# Patient Record
Sex: FEMALE | Race: WHITE | NOT HISPANIC OR LATINO | Employment: FULL TIME | URBAN - NONMETROPOLITAN AREA
[De-identification: names, ages, dates, MRNs, and addresses within clinical notes are randomized per-mention and may not be internally consistent; named-entity substitution may affect disease eponyms.]

---

## 2018-03-02 ENCOUNTER — HOSPITAL ENCOUNTER (EMERGENCY)
Facility: HOSPITAL | Age: 33
Discharge: HOME | End: 2018-03-02
Attending: FAMILY MEDICINE | Admitting: FAMILY MEDICINE

## 2018-03-02 ENCOUNTER — APPOINTMENT (OUTPATIENT)
Dept: GENERAL RADIOLOGY | Facility: HOSPITAL | Age: 33
End: 2018-03-02

## 2018-03-02 VITALS
HEIGHT: 65 IN | DIASTOLIC BLOOD PRESSURE: 70 MMHG | WEIGHT: 253 LBS | BODY MASS INDEX: 42.15 KG/M2 | HEART RATE: 73 BPM | TEMPERATURE: 98.7 F | OXYGEN SATURATION: 97 % | SYSTOLIC BLOOD PRESSURE: 137 MMHG | RESPIRATION RATE: 18 BRPM

## 2018-03-02 DIAGNOSIS — R10.10 PAIN OF UPPER ABDOMEN: ICD-10-CM

## 2018-03-02 DIAGNOSIS — K27.9 PEPTIC ULCER DISEASE: ICD-10-CM

## 2018-03-02 DIAGNOSIS — R07.81 PLEURODYNIA: Primary | ICD-10-CM

## 2018-03-02 LAB
ALBUMIN SERPL-MCNC: 4.5 G/DL (ref 3.4–4.8)
ALBUMIN/GLOB SERPL: 1.3 G/DL (ref 1.1–1.8)
ALP SERPL-CCNC: 53 U/L (ref 38–126)
ALT SERPL W P-5'-P-CCNC: 35 U/L (ref 9–52)
ANION GAP SERPL CALCULATED.3IONS-SCNC: 13 MMOL/L (ref 5–15)
AST SERPL-CCNC: 32 U/L (ref 14–36)
BASOPHILS # BLD AUTO: 0.02 10*3/MM3 (ref 0–0.2)
BASOPHILS NFR BLD AUTO: 0.2 % (ref 0–2)
BILIRUB SERPL-MCNC: 0.3 MG/DL (ref 0.2–1.3)
BUN BLD-MCNC: 17 MG/DL (ref 7–21)
BUN/CREAT SERPL: 18.9 (ref 7–25)
CALCIUM SPEC-SCNC: 9.5 MG/DL (ref 8.4–10.2)
CHLORIDE SERPL-SCNC: 100 MMOL/L (ref 95–110)
CK SERPL-CCNC: 211 U/L (ref 30–135)
CO2 SERPL-SCNC: 26 MMOL/L (ref 22–31)
CREAT BLD-MCNC: 0.9 MG/DL (ref 0.5–1)
DEPRECATED RDW RBC AUTO: 47.2 FL (ref 36.4–46.3)
EOSINOPHIL # BLD AUTO: 0.14 10*3/MM3 (ref 0–0.7)
EOSINOPHIL NFR BLD AUTO: 1.1 % (ref 0–7)
ERYTHROCYTE [DISTWIDTH] IN BLOOD BY AUTOMATED COUNT: 15.2 % (ref 11.5–14.5)
GFR SERPL CREATININE-BSD FRML MDRD: 72 ML/MIN/1.73 (ref 64–149)
GLOBULIN UR ELPH-MCNC: 3.4 GM/DL (ref 2.3–3.5)
GLUCOSE BLD-MCNC: 90 MG/DL (ref 60–100)
HCG SERPL QL: NEGATIVE
HCT VFR BLD AUTO: 35.7 % (ref 35–45)
HGB BLD-MCNC: 11.5 G/DL (ref 12–15.5)
HOLD SPECIMEN: NORMAL
HOLD SPECIMEN: NORMAL
IMM GRANULOCYTES # BLD: 0.03 10*3/MM3 (ref 0–0.02)
IMM GRANULOCYTES NFR BLD: 0.2 % (ref 0–0.5)
LYMPHOCYTES # BLD AUTO: 3.58 10*3/MM3 (ref 0.6–4.2)
LYMPHOCYTES NFR BLD AUTO: 29.2 % (ref 10–50)
MAGNESIUM SERPL-MCNC: 2.1 MG/DL (ref 1.6–2.3)
MCH RBC QN AUTO: 27.1 PG (ref 26.5–34)
MCHC RBC AUTO-ENTMCNC: 32.2 G/DL (ref 31.4–36)
MCV RBC AUTO: 84.2 FL (ref 80–98)
MONOCYTES # BLD AUTO: 0.64 10*3/MM3 (ref 0–0.9)
MONOCYTES NFR BLD AUTO: 5.2 % (ref 0–12)
NEUTROPHILS # BLD AUTO: 7.83 10*3/MM3 (ref 2–8.6)
NEUTROPHILS NFR BLD AUTO: 64.1 % (ref 37–80)
NT-PROBNP SERPL-MCNC: 48.9 PG/ML (ref 0–450)
PLATELET # BLD AUTO: 407 10*3/MM3 (ref 150–450)
PMV BLD AUTO: 9.2 FL (ref 8–12)
POTASSIUM BLD-SCNC: 3.8 MMOL/L (ref 3.5–5.1)
PROT SERPL-MCNC: 7.9 G/DL (ref 6.3–8.6)
RBC # BLD AUTO: 4.24 10*6/MM3 (ref 3.77–5.16)
SODIUM BLD-SCNC: 139 MMOL/L (ref 137–145)
TROPONIN I SERPL-MCNC: <0.012 NG/ML
WBC NRBC COR # BLD: 12.24 10*3/MM3 (ref 3.2–9.8)
WHOLE BLOOD HOLD SPECIMEN: NORMAL
WHOLE BLOOD HOLD SPECIMEN: NORMAL

## 2018-03-02 PROCEDURE — 84703 CHORIONIC GONADOTROPIN ASSAY: CPT | Performed by: FAMILY MEDICINE

## 2018-03-02 PROCEDURE — 93005 ELECTROCARDIOGRAM TRACING: CPT | Performed by: FAMILY MEDICINE

## 2018-03-02 PROCEDURE — 83735 ASSAY OF MAGNESIUM: CPT | Performed by: FAMILY MEDICINE

## 2018-03-02 PROCEDURE — 96374 THER/PROPH/DIAG INJ IV PUSH: CPT

## 2018-03-02 PROCEDURE — 99284 EMERGENCY DEPT VISIT MOD MDM: CPT

## 2018-03-02 PROCEDURE — 82550 ASSAY OF CK (CPK): CPT | Performed by: FAMILY MEDICINE

## 2018-03-02 PROCEDURE — 84484 ASSAY OF TROPONIN QUANT: CPT | Performed by: FAMILY MEDICINE

## 2018-03-02 PROCEDURE — 25010000002 ONDANSETRON PER 1 MG: Performed by: FAMILY MEDICINE

## 2018-03-02 PROCEDURE — 93010 ELECTROCARDIOGRAM REPORT: CPT | Performed by: INTERNAL MEDICINE

## 2018-03-02 PROCEDURE — 96375 TX/PRO/DX INJ NEW DRUG ADDON: CPT

## 2018-03-02 PROCEDURE — 80053 COMPREHEN METABOLIC PANEL: CPT | Performed by: FAMILY MEDICINE

## 2018-03-02 PROCEDURE — 71045 X-RAY EXAM CHEST 1 VIEW: CPT

## 2018-03-02 PROCEDURE — 85025 COMPLETE CBC W/AUTO DIFF WBC: CPT | Performed by: FAMILY MEDICINE

## 2018-03-02 PROCEDURE — 93005 ELECTROCARDIOGRAM TRACING: CPT

## 2018-03-02 PROCEDURE — 83880 ASSAY OF NATRIURETIC PEPTIDE: CPT | Performed by: FAMILY MEDICINE

## 2018-03-02 RX ORDER — FAMOTIDINE 40 MG/1
40 TABLET, FILM COATED ORAL 2 TIMES DAILY
Qty: 10 TABLET | Refills: 0 | OUTPATIENT
Start: 2018-03-02 | End: 2018-12-07

## 2018-03-02 RX ORDER — FAMOTIDINE 10 MG/ML
20 INJECTION, SOLUTION INTRAVENOUS EVERY 12 HOURS SCHEDULED
Status: DISCONTINUED | OUTPATIENT
Start: 2018-03-02 | End: 2018-03-03 | Stop reason: HOSPADM

## 2018-03-02 RX ORDER — ONDANSETRON 2 MG/ML
4 INJECTION INTRAMUSCULAR; INTRAVENOUS ONCE
Status: COMPLETED | OUTPATIENT
Start: 2018-03-02 | End: 2018-03-02

## 2018-03-02 RX ORDER — HYDROCODONE BITARTRATE AND ACETAMINOPHEN 5; 325 MG/1; MG/1
1 TABLET ORAL EVERY 6 HOURS PRN
Qty: 15 TABLET | Refills: 0 | OUTPATIENT
Start: 2018-03-02 | End: 2018-12-07

## 2018-03-02 RX ORDER — ASPIRIN 325 MG
325 TABLET ORAL ONCE
Status: COMPLETED | OUTPATIENT
Start: 2018-03-02 | End: 2018-03-02

## 2018-03-02 RX ORDER — ALUMINA, MAGNESIA, AND SIMETHICONE 2400; 2400; 240 MG/30ML; MG/30ML; MG/30ML
15 SUSPENSION ORAL ONCE
Status: COMPLETED | OUTPATIENT
Start: 2018-03-02 | End: 2018-03-02

## 2018-03-02 RX ORDER — ONDANSETRON 4 MG/1
4 TABLET, ORALLY DISINTEGRATING ORAL EVERY 6 HOURS PRN
Qty: 10 TABLET | Refills: 0 | OUTPATIENT
Start: 2018-03-02 | End: 2018-12-07

## 2018-03-02 RX ORDER — SODIUM CHLORIDE 0.9 % (FLUSH) 0.9 %
10 SYRINGE (ML) INJECTION AS NEEDED
Status: DISCONTINUED | OUTPATIENT
Start: 2018-03-02 | End: 2018-03-03 | Stop reason: HOSPADM

## 2018-03-02 RX ADMIN — ASPIRIN 325 MG: 325 TABLET, COATED ORAL at 20:25

## 2018-03-02 RX ADMIN — ALUMINUM HYDROXIDE, MAGNESIUM HYDROXIDE, AND DIMETHICONE 15 ML: 400; 400; 40 SUSPENSION ORAL at 20:26

## 2018-03-02 RX ADMIN — LIDOCAINE HYDROCHLORIDE 15 ML: 20 SOLUTION ORAL; TOPICAL at 20:26

## 2018-03-02 RX ADMIN — FAMOTIDINE 20 MG: 10 INJECTION, SOLUTION INTRAVENOUS at 20:26

## 2018-03-02 RX ADMIN — ONDANSETRON 4 MG: 2 INJECTION, SOLUTION INTRAMUSCULAR; INTRAVENOUS at 20:26

## 2018-03-03 NOTE — ED PROVIDER NOTES
Subjective   Patient is a 32 y.o. female presenting with chest pain.   Chest Pain   Pain location:  Substernal area  Pain quality: sharp    Pain radiates to:  Upper back  Pain severity:  Moderate  Duration:  3 days  Timing:  Intermittent  Progression:  Waxing and waning  Chronicity:  New  Context: breathing and movement    Relieved by:  Nothing  Worsened by:  Coughing, exertion, movement and deep breathing (eating, swallowing)  Ineffective treatments:  None tried  Associated symptoms: abdominal pain, back pain, dizziness and nausea    Associated symptoms: no altered mental status, no anorexia, no anxiety, no claudication, no cough, no diaphoresis, no dysphagia, no fatigue, no fever, no headache, no lower extremity edema, no near-syncope, no numbness, no palpitations, no shortness of breath, no vomiting and no weakness        Review of Systems   Constitutional: Negative for appetite change, chills, diaphoresis, fatigue and fever.   HENT: Negative for congestion, ear discharge, ear pain, nosebleeds, rhinorrhea, sinus pressure, sore throat and trouble swallowing.    Eyes: Negative for discharge and redness.   Respiratory: Negative for apnea, cough, chest tightness, shortness of breath and wheezing.    Cardiovascular: Positive for chest pain. Negative for palpitations, claudication and near-syncope.   Gastrointestinal: Positive for abdominal pain and nausea. Negative for anorexia, diarrhea and vomiting.   Endocrine: Negative for polyuria.   Genitourinary: Negative for dysuria, frequency and urgency.   Musculoskeletal: Positive for back pain. Negative for myalgias and neck pain.   Skin: Negative for color change and rash.   Allergic/Immunologic: Negative for immunocompromised state.   Neurological: Positive for dizziness. Negative for seizures, syncope, weakness, light-headedness, numbness and headaches.   Hematological: Negative for adenopathy. Does not bruise/bleed easily.   Psychiatric/Behavioral: Negative for  behavioral problems and confusion.   All other systems reviewed and are negative.      No past medical history on file.    Allergies   Allergen Reactions   • Penicillins Other (See Comments)     unknown       No past surgical history on file.    No family history on file.    Social History     Social History   • Marital status: Single           Objective   Physical Exam   Constitutional: She is oriented to person, place, and time. She appears well-developed and well-nourished.   HENT:   Head: Normocephalic and atraumatic.   Nose: Nose normal.   Mouth/Throat: Oropharynx is clear and moist.   Eyes: Conjunctivae and EOM are normal. Pupils are equal, round, and reactive to light. Right eye exhibits no discharge. Left eye exhibits no discharge. No scleral icterus.   Neck: Normal range of motion. Neck supple. No tracheal deviation present.   Cardiovascular: Normal rate, regular rhythm and normal heart sounds.    No murmur heard.  Pulmonary/Chest: Effort normal and breath sounds normal. No stridor. No respiratory distress. She has no wheezes. She has no rales.       Abdominal: Soft. Bowel sounds are normal. She exhibits no distension and no mass. There is no tenderness. There is no rebound and no guarding.   Genitourinary: There is breast tenderness.   Musculoskeletal: She exhibits no edema.   Neurological: She is alert and oriented to person, place, and time. Coordination normal.   Skin: Skin is warm and dry. No rash noted. No erythema.   Psychiatric: She has a normal mood and affect. Her behavior is normal. Thought content normal.   Nursing note and vitals reviewed.      ECG 12 Lead    Date/Time: 3/2/2018 9:51 PM  Performed by: DANNY ORNELAS  Authorized by: DANNY ORNELAS   Interpreted by physician  Rhythm: sinus rhythm  Rate: normal  BPM: 74  ST Segments: ST segments normal                 ED Course  ED Course            Labs Reviewed   CBC WITH AUTO DIFFERENTIAL - Abnormal; Notable for the following:         Result Value    WBC 12.24 (*)     Hemoglobin 11.5 (*)     RDW 15.2 (*)     RDW-SD 47.2 (*)     Immature Grans, Absolute 0.03 (*)     All other components within normal limits   CK - Abnormal; Notable for the following:     Creatine Kinase 211 (*)     All other components within normal limits   TROPONIN (IN-HOUSE) - Normal   COMPREHENSIVE METABOLIC PANEL - Normal   BNP (IN-HOUSE) - Normal   HCG, SERUM, QUALITATIVE - Normal   MAGNESIUM - Normal   RAINBOW DRAW    Narrative:     The following orders were created for panel order [place] Draw.  Procedure                               Abnormality         Status                     ---------                               -----------         ------                     Light Blue Top[[id number]]                                   Final result               Green Top (Gel)[[id number]]                                  Final result               Lavender Top[[id number]]                                     Final result               Gold Top - SST[[id number]]                                   Final result                 Please view results for these tests on the individual orders.   TROPONIN (IN-HOUSE)   CBC AND DIFFERENTIAL    Narrative:     The following orders were created for panel order CBC & Differential.  Procedure                               Abnormality         Status                     ---------                               -----------         ------                     CBC Auto Differential[[id number]]        Abnormal            Final result                 Please view results for these tests on the individual orders.   LIGHT BLUE TOP   GREEN TOP   LAVENDER TOP   GOLD TOP - SST       XR Chest 1 View   Final Result   No active disease.      Electronically signed by:  Caleb Jameson  3/2/2018 8:51 PM [facility]   Workstation: [id number]                  [facility]    Final diagnoses:   Pleurodynia   Pain of upper abdomen   Peptic ulcer disease            Chidi Bennett,  MD  03/02/18 7745

## 2018-03-03 NOTE — DISCHARGE INSTRUCTIONS
"    Hypertension  Hypertension, commonly called high blood pressure, is when the force of blood pumping through the arteries is too strong. The arteries are the blood vessels that carry blood from the heart throughout the body. Hypertension forces the heart to work harder to pump blood and may cause arteries to become narrow or stiff. Having untreated or uncontrolled hypertension can cause heart attacks, strokes, kidney disease, and other problems.  A blood pressure reading consists of a higher number over a lower number. Ideally, your blood pressure should be below 120/80. The first (\"top\") number is called the systolic pressure. It is a measure of the pressure in your arteries as your heart beats. The second (\"bottom\") number is called the diastolic pressure. It is a measure of the pressure in your arteries as the heart relaxes.  What are the causes?  The cause of this condition is not known.  What increases the risk?  Some risk factors for high blood pressure are under your control. Others are not.  Factors you can change   · Smoking.  · Having type 2 diabetes mellitus, high cholesterol, or both.  · Not getting enough exercise or physical activity.  · Being overweight.  · Having too much fat, sugar, calories, or salt (sodium) in your diet.  · Drinking too much alcohol.  Factors that are difficult or impossible to change   · Having chronic kidney disease.  · Having a family history of high blood pressure.  · Age. Risk increases with age.  · Race. You may be at higher risk if you are -American.  · Gender. Men are at higher risk than women before age 45. After age 65, women are at higher risk than men.  · Having obstructive sleep apnea.  · Stress.  What are the signs or symptoms?  Extremely high blood pressure (hypertensive crisis) may cause:  · Headache.  · Anxiety.  · Shortness of breath.  · Nosebleed.  · Nausea and vomiting.  · Severe chest pain.  · Jerky movements you cannot control (seizures).  How is " this diagnosed?  This condition is diagnosed by measuring your blood pressure while you are seated, with your arm resting on a surface. The cuff of the blood pressure monitor will be placed directly against the skin of your upper arm at the level of your heart. It should be measured at least twice using the same arm. Certain conditions can cause a difference in blood pressure between your right and left arms.  Certain factors can cause blood pressure readings to be lower or higher than normal (elevated) for a short period of time:  · When your blood pressure is higher when you are in a health care provider's office than when you are at home, this is called white coat hypertension. Most people with this condition do not need medicines.  · When your blood pressure is higher at home than when you are in a health care provider's office, this is called masked hypertension. Most people with this condition may need medicines to control blood pressure.  If you have a high blood pressure reading during one visit or you have normal blood pressure with other risk factors:  · You may be asked to return on a different day to have your blood pressure checked again.  · You may be asked to monitor your blood pressure at home for 1 week or longer.  If you are diagnosed with hypertension, you may have other blood or imaging tests to help your health care provider understand your overall risk for other conditions.  How is this treated?  This condition is treated by making healthy lifestyle changes, such as eating healthy foods, exercising more, and reducing your alcohol intake. Your health care provider may prescribe medicine if lifestyle changes are not enough to get your blood pressure under control, and if:  · Your systolic blood pressure is above 130.  · Your diastolic blood pressure is above 80.  Your personal target blood pressure may vary depending on your medical conditions, your age, and other factors.  Follow these  instructions at home:  Eating and drinking   · Eat a diet that is high in fiber and potassium, and low in sodium, added sugar, and fat. An example eating plan is called the DASH (Dietary Approaches to Stop Hypertension) diet. To eat this way:  ¨ Eat plenty of fresh fruits and vegetables. Try to fill half of your plate at each meal with fruits and vegetables.  ¨ Eat whole grains, such as whole wheat pasta, brown rice, or whole grain bread. Fill about one quarter of your plate with whole grains.  ¨ Eat or drink low-fat dairy products, such as skim milk or low-fat yogurt.  ¨ Avoid fatty cuts of meat, processed or cured meats, and poultry with skin. Fill about one quarter of your plate with lean proteins, such as fish, chicken without skin, beans, eggs, and tofu.  ¨ Avoid premade and processed foods. These tend to be higher in sodium, added sugar, and fat.  · Reduce your daily sodium intake. Most people with hypertension should eat less than 1,500 mg of sodium a day.  · Limit alcohol intake to no more than 1 drink a day for nonpregnant women and 2 drinks a day for men. One drink equals 12 oz of beer, 5 oz of wine, or 1½ oz of hard liquor.  Lifestyle   · Work with your health care provider to maintain a healthy body weight or to lose weight. Ask what an ideal weight is for you.  · Get at least 30 minutes of exercise that causes your heart to beat faster (aerobic exercise) most days of the week. Activities may include walking, swimming, or biking.  · Include exercise to strengthen your muscles (resistance exercise), such as pilates or lifting weights, as part of your weekly exercise routine. Try to do these types of exercises for 30 minutes at least 3 days a week.  · Do not use any products that contain nicotine or tobacco, such as cigarettes and e-cigarettes. If you need help quitting, ask your health care provider.  · Monitor your blood pressure at home as told by your health care provider.  · Keep all follow-up visits  as told by your health care provider. This is important.  Medicines   · Take over-the-counter and prescription medicines only as told by your health care provider. Follow directions carefully. Blood pressure medicines must be taken as prescribed.  · Do not skip doses of blood pressure medicine. Doing this puts you at risk for problems and can make the medicine less effective.  · Ask your health care provider about side effects or reactions to medicines that you should watch for.  Contact a health care provider if:  · You think you are having a reaction to a medicine you are taking.  · You have headaches that keep coming back (recurring).  · You feel dizzy.  · You have swelling in your ankles.  · You have trouble with your vision.  Get help right away if:  · You develop a severe headache or confusion.  · You have unusual weakness or numbness.  · You feel faint.  · You have severe pain in your chest or abdomen.  · You vomit repeatedly.  · You have trouble breathing.  Summary  · Hypertension is when the force of blood pumping through your arteries is too strong. If this condition is not controlled, it may put you at risk for serious complications.  · Your personal target blood pressure may vary depending on your medical conditions, your age, and other factors. For most people, a normal blood pressure is less than 120/80.  · Hypertension is treated with lifestyle changes, medicines, or a combination of both. Lifestyle changes include weight loss, eating a healthy, low-sodium diet, exercising more, and limiting alcohol.  This information is not intended to replace advice given to you by your health care provider. Make sure you discuss any questions you have with your health care provider.  Document Released: 12/18/2006 Document Revised: 11/15/2017 Document Reviewed: 11/15/2017  [employer] Interactive Patient Education © 2017 [employer]

## 2018-12-07 ENCOUNTER — HOSPITAL ENCOUNTER (EMERGENCY)
Facility: HOSPITAL | Age: 33
Discharge: HOME | End: 2018-12-07
Attending: EMERGENCY MEDICINE | Admitting: EMERGENCY MEDICINE

## 2018-12-07 VITALS
TEMPERATURE: 98.6 F | HEART RATE: 72 BPM | DIASTOLIC BLOOD PRESSURE: 70 MMHG | BODY MASS INDEX: 41.66 KG/M2 | OXYGEN SATURATION: 98 % | WEIGHT: 244 LBS | SYSTOLIC BLOOD PRESSURE: 130 MMHG | HEIGHT: 64 IN | RESPIRATION RATE: 18 BRPM

## 2018-12-07 DIAGNOSIS — R89.9 ABNORMAL LABORATORY TEST: Primary | ICD-10-CM

## 2018-12-07 LAB
ANION GAP SERPL CALCULATED.3IONS-SCNC: 10 MMOL/L (ref 5–15)
BASOPHILS # BLD AUTO: 0.02 10*3/MM3 (ref 0–0.2)
BASOPHILS NFR BLD AUTO: 0.2 % (ref 0–2)
BILIRUB UR QL STRIP: NEGATIVE
BUN BLD-MCNC: 16 MG/DL (ref 7–21)
BUN/CREAT SERPL: 21.6 (ref 7–25)
CALCIUM SPEC-SCNC: 9.3 MG/DL (ref 8.4–10.2)
CHLORIDE SERPL-SCNC: 97 MMOL/L (ref 95–110)
CLARITY UR: CLEAR
CO2 SERPL-SCNC: 25 MMOL/L (ref 22–31)
COLOR UR: YELLOW
CREAT BLD-MCNC: 0.74 MG/DL (ref 0.5–1)
DEPRECATED RDW RBC AUTO: 43.8 FL (ref 36.4–46.3)
EOSINOPHIL # BLD AUTO: 0.14 10*3/MM3 (ref 0–0.7)
EOSINOPHIL NFR BLD AUTO: 1.3 % (ref 0–7)
ERYTHROCYTE [DISTWIDTH] IN BLOOD BY AUTOMATED COUNT: 13.9 % (ref 11.5–14.5)
GFR SERPL CREATININE-BSD FRML MDRD: 89 ML/MIN/1.73 (ref 64–149)
GLUCOSE BLD-MCNC: 86 MG/DL (ref 60–100)
GLUCOSE UR STRIP-MCNC: NEGATIVE MG/DL
HCG SERPL QL: NEGATIVE
HCT VFR BLD AUTO: 33.9 % (ref 35–45)
HGB BLD-MCNC: 11.1 G/DL (ref 12–15.5)
HGB UR QL STRIP.AUTO: NEGATIVE
HOLD SPECIMEN: NORMAL
HOLD SPECIMEN: NORMAL
IMM GRANULOCYTES # BLD: 0.03 10*3/MM3 (ref 0–0.02)
IMM GRANULOCYTES NFR BLD: 0.3 % (ref 0–0.5)
KETONES UR QL STRIP: NEGATIVE
LEUKOCYTE ESTERASE UR QL STRIP.AUTO: NEGATIVE
LYMPHOCYTES # BLD AUTO: 3.66 10*3/MM3 (ref 0.6–4.2)
LYMPHOCYTES NFR BLD AUTO: 35.2 % (ref 10–50)
MCH RBC QN AUTO: 28.2 PG (ref 26.5–34)
MCHC RBC AUTO-ENTMCNC: 32.7 G/DL (ref 31.4–36)
MCV RBC AUTO: 86 FL (ref 80–98)
MONOCYTES # BLD AUTO: 0.61 10*3/MM3 (ref 0–0.9)
MONOCYTES NFR BLD AUTO: 5.9 % (ref 0–12)
NEUTROPHILS # BLD AUTO: 5.94 10*3/MM3 (ref 2–8.6)
NEUTROPHILS NFR BLD AUTO: 57.1 % (ref 37–80)
NITRITE UR QL STRIP: NEGATIVE
PH UR STRIP.AUTO: 6.5 [PH] (ref 5–9)
PLATELET # BLD AUTO: 357 10*3/MM3 (ref 150–450)
PMV BLD AUTO: 9.6 FL (ref 8–12)
POTASSIUM BLD-SCNC: 3.6 MMOL/L (ref 3.5–5.1)
PROT UR QL STRIP: NEGATIVE
RBC # BLD AUTO: 3.94 10*6/MM3 (ref 3.77–5.16)
SODIUM BLD-SCNC: 132 MMOL/L (ref 137–145)
SP GR UR STRIP: 1.01 (ref 1–1.03)
UROBILINOGEN UR QL STRIP: NORMAL
WBC NRBC COR # BLD: 10.4 10*3/MM3 (ref 3.2–9.8)
WHOLE BLOOD HOLD SPECIMEN: NORMAL
WHOLE BLOOD HOLD SPECIMEN: NORMAL

## 2018-12-07 PROCEDURE — 81003 URINALYSIS AUTO W/O SCOPE: CPT | Performed by: EMERGENCY MEDICINE

## 2018-12-07 PROCEDURE — 85025 COMPLETE CBC W/AUTO DIFF WBC: CPT | Performed by: EMERGENCY MEDICINE

## 2018-12-07 PROCEDURE — 84703 CHORIONIC GONADOTROPIN ASSAY: CPT | Performed by: EMERGENCY MEDICINE

## 2018-12-07 PROCEDURE — 80048 BASIC METABOLIC PNL TOTAL CA: CPT | Performed by: EMERGENCY MEDICINE

## 2018-12-07 PROCEDURE — 99283 EMERGENCY DEPT VISIT LOW MDM: CPT

## 2018-12-07 RX ORDER — LABETALOL 100 MG/1
100 TABLET, FILM COATED ORAL 2 TIMES DAILY
COMMUNITY
End: 2021-10-28 | Stop reason: SDUPTHER

## 2018-12-07 RX ORDER — FLUOXETINE HYDROCHLORIDE 20 MG/1
20 CAPSULE ORAL DAILY
COMMUNITY
End: 2021-06-08 | Stop reason: HOSPADM

## 2018-12-07 RX ORDER — PHENTERMINE HYDROCHLORIDE 30 MG/1
15 CAPSULE ORAL EVERY MORNING
COMMUNITY
End: 2021-06-08 | Stop reason: HOSPADM

## 2018-12-08 NOTE — DISCHARGE INSTRUCTIONS
Follow-up with primary care for further testing.  Please return with any new or worsening symptoms.

## 2018-12-08 NOTE — ED NOTES
Pt states she went to [facility] last Friday, pt received a B12 injection then because she was feeling fatigued. The clinic called her yesterday to let her know her results were back and her hemoglobin was 8. Pt states she has noticed some blood in her stool, but she said she gets constipated a lot because of the endometriosis.      Lesia Anton RN  12/07/18 0198

## 2018-12-08 NOTE — ED PROVIDER NOTES
Subjective   33 year old female presents to the ED with complaint of abnormal labs. Reports being seen in the last few days for fatigue and received a B12 injection and had labs drawn. Was called today regarding low hemoglobin and to go directly to ED. Has felt fatigued for nearly 3 months. Reports history of needing blood transfusion after a pregnancy and has had anemia since but unsure of previous Hgb levels. States history of heavy bleeding that has improved with birth control. Has had some blood in stools with hemorrhoids in the past but has not seen any recently.    Family history, surgical history, social history, current medications and allergies are reviewed with the patient and triage documentation and vitals are reviewed.          History provided by:  Patient   used: No        Review of Systems   Constitutional: Positive for fatigue. Negative for appetite change, chills and fever.   HENT: Negative for congestion, sinus pressure, sinus pain and sore throat.    Respiratory: Negative for cough, shortness of breath and wheezing.    Cardiovascular: Negative for chest pain, palpitations and leg swelling.   Gastrointestinal: Negative for abdominal pain, anal bleeding, blood in stool, diarrhea, nausea and vomiting.   Genitourinary: Negative for dysuria, frequency, hematuria and urgency.   Musculoskeletal: Negative for back pain and neck pain.   Skin: Negative for color change and rash.   Hematological: Negative for adenopathy. Does not bruise/bleed easily.       Past Medical History:   Diagnosis Date   • Anemia    • Diverticulitis    • Endometriosis    • Hypertension    • Ovarian cyst        Allergies   Allergen Reactions   • Penicillins Other (See Comments)     unknown       Past Surgical History:   Procedure Laterality Date   • APPENDECTOMY     •  SECTION         History reviewed. No pertinent family history.    Social History     Socioeconomic History   • Marital status: Single      Spouse name: Not on file   • Number of children: Not on file   • Years of education: Not on file   • Highest education level: Not on file   Tobacco Use   • Smoking status: Never Smoker   • Smokeless tobacco: Never Used   Substance and Sexual Activity   • Alcohol use: Yes     Comment: occasionally    • Drug use: No   • Sexual activity: Defer           Objective   Physical Exam   Constitutional: She is oriented to person, place, and time. She appears well-developed and well-nourished.   HENT:   Head: Normocephalic.   Mouth/Throat: Oropharynx is clear and moist.   Eyes: Conjunctivae are normal. Pupils are equal, round, and reactive to light. No scleral icterus.   Neck: Normal range of motion. Neck supple.   Cardiovascular: Normal rate, regular rhythm, normal heart sounds and intact distal pulses.   No murmur heard.  Pulmonary/Chest: Effort normal and breath sounds normal. No respiratory distress.   Abdominal: Soft. Bowel sounds are normal. There is no tenderness.   Neurological: She is alert and oriented to person, place, and time.   Skin: Skin is warm and dry. Capillary refill takes less than 2 seconds. No pallor.   Psychiatric: She has a normal mood and affect.   Nursing note and vitals reviewed.      Procedures  none         ED Course      Labs Reviewed   BASIC METABOLIC PANEL - Abnormal; Notable for the following components:       Result Value    Sodium 132 (*)     All other components within normal limits   CBC WITH AUTO DIFFERENTIAL - Abnormal; Notable for the following components:    WBC 10.40 (*)     Hemoglobin 11.1 (*)     Hematocrit 33.9 (*)     Immature Grans, Absolute 0.03 (*)     All other components within normal limits   HCG, SERUM, QUALITATIVE - Normal   URINALYSIS W/ CULTURE IF INDICATED - Normal    Narrative:     Urine microscopic not indicated.   RAINBOW DRAW    Narrative:     The following orders were created for panel order [place] Draw.  Procedure                               Abnormality          Status                     ---------                               -----------         ------                     Light Blue Top[[id number]]                                   Final result               Green Top (Gel)[[id number]]                                  Final result               Lavender Top[[id number]]                                     Final result               Gold Top - SST[[id number]]                                   Final result                 Please view results for these tests on the individual orders.   LIGHT BLUE TOP   GREEN TOP   LAVENDER TOP   GOLD TOP - SST   CBC AND DIFFERENTIAL    Narrative:     The following orders were created for panel order CBC & Differential.  Procedure                               Abnormality         Status                     ---------                               -----------         ------                     CBC Auto Differential[[id number]]        Abnormal            Final result                 Please view results for these tests on the individual orders.     No results found.          MDM  Number of Diagnoses or Management Options  Abnormal laboratory test:      Amount and/or Complexity of Data Reviewed  Clinical lab tests: reviewed    Patient Progress  Patient progress: stable    Repeat labs reveal hemoglobin of 11.1. Cannot see/review reported abnormal labs. Current hgb consistent with patient baseline. No obvious bleeding, stable vital signs. Agreeable to discharge with follow-up with PCP regarding fatigue.    Final diagnoses:   Abnormal laboratory test            Theron Quinteros, DO  12/08/18 1426

## 2020-10-28 ENCOUNTER — HOSPITAL ENCOUNTER (EMERGENCY)
Facility: HOSPITAL | Age: 35
Discharge: HOME | End: 2020-10-28
Attending: EMERGENCY MEDICINE | Admitting: EMERGENCY MEDICINE

## 2020-10-28 ENCOUNTER — APPOINTMENT (OUTPATIENT)
Dept: GENERAL RADIOLOGY | Facility: HOSPITAL | Age: 35
End: 2020-10-28

## 2020-10-28 VITALS
OXYGEN SATURATION: 99 % | HEART RATE: 82 BPM | RESPIRATION RATE: 20 BRPM | WEIGHT: 278 LBS | DIASTOLIC BLOOD PRESSURE: 93 MMHG | SYSTOLIC BLOOD PRESSURE: 195 MMHG | HEIGHT: 64 IN | BODY MASS INDEX: 47.46 KG/M2 | TEMPERATURE: 98.6 F

## 2020-10-28 DIAGNOSIS — S63.502A SPRAIN OF LEFT WRIST, INITIAL ENCOUNTER: ICD-10-CM
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
## 2020-10-28 DIAGNOSIS — R07.89 ANTERIOR CHEST WALL PAIN: ICD-10-CM

## 2020-10-28 PROCEDURE — 99283 EMERGENCY DEPT VISIT LOW MDM: CPT

## 2020-10-28 PROCEDURE — 73130 X-RAY EXAM OF HAND: CPT

## 2020-10-28 PROCEDURE — 73110 X-RAY EXAM OF WRIST: CPT

## 2020-10-28 PROCEDURE — 71046 X-RAY EXAM CHEST 2 VIEWS: CPT

## 2020-10-28 PROCEDURE — 73090 X-RAY EXAM OF FOREARM: CPT

## 2020-10-28 RX ORDER — IBUPROFEN 600 MG/1
600 TABLET ORAL EVERY 8 HOURS PRN
Qty: 21 TABLET | Refills: 0 | Status: SHIPPED | OUTPATIENT
Start: 2020-10-28 | End: 2021-06-08 | Stop reason: HOSPADM

## 2020-10-28 RX ORDER — HYDROCODONE BITARTRATE AND ACETAMINOPHEN 5; 325 MG/1; MG/1
1 TABLET ORAL EVERY 6 HOURS PRN
Qty: 12 TABLET | Refills: 0 | Status: SHIPPED | OUTPATIENT
Start: 2020-10-28 | End: 2021-06-08 | Stop reason: HOSPADM

## 2020-10-28 RX ORDER — HYDROCODONE BITARTRATE AND ACETAMINOPHEN 5; 325 MG/1; MG/1
1 TABLET ORAL ONCE
Status: COMPLETED | OUTPATIENT
Start: 2020-10-28 | End: 2020-10-28

## 2020-10-28 RX ORDER — IBUPROFEN 600 MG/1
600 TABLET ORAL ONCE
Status: COMPLETED | OUTPATIENT
Start: 2020-10-28 | End: 2020-10-28

## 2020-10-28 RX ORDER — ORPHENADRINE CITRATE 100 MG/1
100 TABLET, EXTENDED RELEASE ORAL 2 TIMES DAILY PRN
Qty: 15 TABLET | Refills: 0 | Status: SHIPPED | OUTPATIENT
Start: 2020-10-28 | End: 2021-06-08 | Stop reason: HOSPADM

## 2020-10-28 RX ADMIN — HYDROCODONE BITARTRATE AND ACETAMINOPHEN 1 TABLET: 5; 325 TABLET ORAL at 20:37

## 2020-10-28 RX ADMIN — IBUPROFEN 600 MG: 600 TABLET ORAL at 20:37

## 2020-12-09 ENCOUNTER — TRANSCRIBE ORDERS (OUTPATIENT)
Dept: MRI IMAGING | Facility: HOSPITAL | Age: 35
End: 2020-12-09

## 2020-12-09 DIAGNOSIS — M79.642 LEFT HAND PAIN: ICD-10-CM

## 2020-12-09 DIAGNOSIS — S63.502D SPRAIN OF LEFT FOREARM, SUBSEQUENT ENCOUNTER: Primary | ICD-10-CM

## 2020-12-16 ENCOUNTER — APPOINTMENT (OUTPATIENT)
Dept: MRI IMAGING | Facility: HOSPITAL | Age: 35
End: 2020-12-16

## 2020-12-16 ENCOUNTER — HOSPITAL ENCOUNTER (OUTPATIENT)
Dept: MRI IMAGING | Facility: HOSPITAL | Age: 35
Discharge: HOME | End: 2020-12-16
Admitting: NURSE PRACTITIONER

## 2020-12-16 DIAGNOSIS — M79.642 LEFT HAND PAIN: ICD-10-CM

## 2020-12-16 PROCEDURE — 73218 MRI UPPER EXTREMITY W/O DYE: CPT

## 2021-01-05 ENCOUNTER — OFFICE VISIT (OUTPATIENT)
Dept: ORTHOPEDIC SURGERY | Facility: CLINIC | Age: 36
End: 2021-01-05

## 2021-01-05 VITALS
SYSTOLIC BLOOD PRESSURE: 144 MMHG | HEART RATE: 81 BPM | DIASTOLIC BLOOD PRESSURE: 90 MMHG | HEIGHT: 64 IN | WEIGHT: 273.3 LBS | TEMPERATURE: 98 F | OXYGEN SATURATION: 97 % | BODY MASS INDEX: 46.66 KG/M2

## 2021-01-05 DIAGNOSIS — M25.532 ACUTE PAIN OF LEFT WRIST: Primary | ICD-10-CM

## 2021-01-05 PROCEDURE — 99203 OFFICE O/P NEW LOW 30 MIN: CPT | Performed by: ORTHOPAEDIC SURGERY
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
## 2021-01-26 ENCOUNTER — TRANSCRIBE ORDERS (OUTPATIENT)
Dept: ORTHOPEDIC SURGERY | Facility: CLINIC | Age: 36
End: 2021-01-26

## 2021-01-26 DIAGNOSIS — M79.643 PAIN OF HAND, UNSPECIFIED LATERALITY: Primary | ICD-10-CM

## 2021-01-27 ENCOUNTER — TRANSCRIBE ORDERS (OUTPATIENT)
Dept: MRI IMAGING | Facility: HOSPITAL | Age: 36
End: 2021-01-27

## 2021-01-27 DIAGNOSIS — M79.642 LEFT HAND PAIN: Primary | ICD-10-CM

## 2021-01-27 DIAGNOSIS — S63.502D SPRAIN OF LEFT WRIST, SUBSEQUENT ENCOUNTER: ICD-10-CM

## 2021-06-08 ENCOUNTER — LAB (OUTPATIENT)
Dept: LAB | Facility: HOSPITAL | Age: 36
End: 2021-06-08

## 2021-06-08 ENCOUNTER — INITIAL PRENATAL (OUTPATIENT)
Dept: OBSTETRICS AND GYNECOLOGY | Facility: CLINIC | Age: 36
End: 2021-06-08

## 2021-06-08 VITALS — WEIGHT: 277 LBS | DIASTOLIC BLOOD PRESSURE: 82 MMHG | SYSTOLIC BLOOD PRESSURE: 126 MMHG | BODY MASS INDEX: 47.55 KG/M2

## 2021-06-08 DIAGNOSIS — I10 HYPERTENSION, UNSPECIFIED TYPE: ICD-10-CM

## 2021-06-08 DIAGNOSIS — E28.2 PCOS (POLYCYSTIC OVARIAN SYNDROME): ICD-10-CM

## 2021-06-08 DIAGNOSIS — Z32.01 POSITIVE PREGNANCY TEST: ICD-10-CM

## 2021-06-08 DIAGNOSIS — O36.80X0 ENCOUNTER TO DETERMINE FETAL VIABILITY OF PREGNANCY, SINGLE OR UNSPECIFIED FETUS: ICD-10-CM

## 2021-06-08 DIAGNOSIS — Z32.00 PREGNANCY EXAMINATION OR TEST, PREGNANCY UNCONFIRMED: ICD-10-CM

## 2021-06-08 DIAGNOSIS — Z34.80 SUPERVISION OF OTHER NORMAL PREGNANCY: Primary | ICD-10-CM

## 2021-06-08 DIAGNOSIS — Z98.891 HISTORY OF C-SECTION: ICD-10-CM

## 2021-06-08 DIAGNOSIS — E66.01 MORBID OBESITY WITH BMI OF 45.0-49.9, ADULT (HCC): ICD-10-CM

## 2021-06-08 DIAGNOSIS — E74.39 GLUCOSE INTOLERANCE: Primary | ICD-10-CM

## 2021-06-08 PROBLEM — O24.419 GESTATIONAL DIABETES MELLITUS (GDM) IN FIRST TRIMESTER: Status: ACTIVE | Noted: 2021-06-08

## 2021-06-08 PROBLEM — O26.891 RH NEGATIVE STATUS DURING PREGNANCY IN FIRST TRIMESTER: Status: ACTIVE | Noted: 2021-06-08

## 2021-06-08 PROBLEM — Z67.91 RH NEGATIVE STATUS DURING PREGNANCY IN FIRST TRIMESTER: Status: ACTIVE | Noted: 2021-06-08

## 2021-06-08 LAB
ABO GROUP BLD: NORMAL
ALBUMIN SERPL-MCNC: 4.2 G/DL (ref 3.5–5.2)
ALBUMIN/GLOB SERPL: 1.4 G/DL
ALP SERPL-CCNC: 69 U/L (ref 39–117)
ALT SERPL W P-5'-P-CCNC: 16 U/L (ref 1–33)
AMPHET+METHAMPHET UR QL: NEGATIVE
AMPHETAMINES UR QL: NEGATIVE
ANION GAP SERPL CALCULATED.3IONS-SCNC: 11.4 MMOL/L (ref 5–15)
AST SERPL-CCNC: 12 U/L (ref 1–32)
B-HCG UR QL: POSITIVE
BARBITURATES UR QL SCN: NEGATIVE
BASOPHILS # BLD AUTO: 0.03 10*3/MM3 (ref 0–0.2)
BASOPHILS NFR BLD AUTO: 0.3 % (ref 0–1.5)
BENZODIAZ UR QL SCN: NEGATIVE
BILIRUB SERPL-MCNC: 0.3 MG/DL (ref 0–1.2)
BLD GP AB SCN SERPL QL: NEGATIVE
BUN SERPL-MCNC: 11 MG/DL (ref 6–20)
BUN/CREAT SERPL: 14.3 (ref 7–25)
BUPRENORPHINE SERPL-MCNC: NEGATIVE NG/ML
CALCIUM SPEC-SCNC: 9 MG/DL (ref 8.6–10.5)
CANNABINOIDS SERPL QL: NEGATIVE
CHLORIDE SERPL-SCNC: 102 MMOL/L (ref 98–107)
CO2 SERPL-SCNC: 21.6 MMOL/L (ref 22–29)
COCAINE UR QL: NEGATIVE
CREAT SERPL-MCNC: 0.77 MG/DL (ref 0.57–1)
DEPRECATED RDW RBC AUTO: 42.8 FL (ref 37–54)
EOSINOPHIL # BLD AUTO: 0.09 10*3/MM3 (ref 0–0.4)
EOSINOPHIL NFR BLD AUTO: 0.8 % (ref 0.3–6.2)
ERYTHROCYTE [DISTWIDTH] IN BLOOD BY AUTOMATED COUNT: 13.4 % (ref 12.3–15.4)
GFR SERPL CREATININE-BSD FRML MDRD: 84 ML/MIN/1.73
GLOBULIN UR ELPH-MCNC: 3.1 GM/DL
GLUCOSE 1H P 100 G GLC PO SERPL-MCNC: 181 MG/DL (ref 60–140)
GLUCOSE SERPL-MCNC: 155 MG/DL (ref 65–99)
HBA1C MFR BLD: 5.8 % (ref 4.8–5.6)
HBV SURFACE AG SERPL QL IA: NORMAL
HCG INTACT+B SERPL-ACNC: NORMAL MIU/ML
HCT VFR BLD AUTO: 34.3 % (ref 34–46.6)
HCV AB SER DONR QL: NORMAL
HGB BLD-MCNC: 11 G/DL (ref 12–15.9)
HIV1+2 AB SER QL: NORMAL
IMM GRANULOCYTES # BLD AUTO: 0.04 10*3/MM3 (ref 0–0.05)
IMM GRANULOCYTES NFR BLD AUTO: 0.4 % (ref 0–0.5)
INTERNAL NEGATIVE CONTROL: ABNORMAL
INTERNAL POSITIVE CONTROL: ABNORMAL
LYMPHOCYTES # BLD AUTO: 2.31 10*3/MM3 (ref 0.7–3.1)
LYMPHOCYTES NFR BLD AUTO: 20.9 % (ref 19.6–45.3)
Lab: ABNORMAL
Lab: NORMAL
MCH RBC QN AUTO: 28.1 PG (ref 26.6–33)
MCHC RBC AUTO-ENTMCNC: 32.1 G/DL (ref 31.5–35.7)
MCV RBC AUTO: 87.5 FL (ref 79–97)
METHADONE UR QL SCN: NEGATIVE
MONOCYTES # BLD AUTO: 0.32 10*3/MM3 (ref 0.1–0.9)
MONOCYTES NFR BLD AUTO: 2.9 % (ref 5–12)
NEUTROPHILS NFR BLD AUTO: 74.7 % (ref 42.7–76)
NEUTROPHILS NFR BLD AUTO: 8.26 10*3/MM3 (ref 1.7–7)
NRBC BLD AUTO-RTO: 0.1 /100 WBC (ref 0–0.2)
OPIATES UR QL: NEGATIVE
OXYCODONE UR QL SCN: NEGATIVE
PCP UR QL SCN: NEGATIVE
PLATELET # BLD AUTO: 415 10*3/MM3 (ref 140–450)
PMV BLD AUTO: 9.6 FL (ref 6–12)
POTASSIUM SERPL-SCNC: 3.9 MMOL/L (ref 3.5–5.2)
PROPOXYPH UR QL: NEGATIVE
PROT SERPL-MCNC: 7.3 G/DL (ref 6–8.5)
RBC # BLD AUTO: 3.92 10*6/MM3 (ref 3.77–5.28)
RH BLD: NEGATIVE
SODIUM SERPL-SCNC: 135 MMOL/L (ref 136–145)
TRICYCLICS UR QL SCN: NEGATIVE
WBC # BLD AUTO: 11.05 10*3/MM3 (ref 3.4–10.8)

## 2021-06-08 PROCEDURE — 81025 URINE PREGNANCY TEST: CPT | Performed by: NURSE PRACTITIONER

## 2021-06-08 PROCEDURE — 81003 URINALYSIS AUTO W/O SCOPE: CPT | Performed by: NURSE PRACTITIONER

## 2021-06-08 PROCEDURE — 86900 BLOOD TYPING SEROLOGIC ABO: CPT | Performed by: NURSE PRACTITIONER

## 2021-06-08 PROCEDURE — 80053 COMPREHEN METABOLIC PANEL: CPT | Performed by: NURSE PRACTITIONER

## 2021-06-08 PROCEDURE — 83036 HEMOGLOBIN GLYCOSYLATED A1C: CPT | Performed by: NURSE PRACTITIONER

## 2021-06-08 PROCEDURE — 87491 CHLMYD TRACH DNA AMP PROBE: CPT | Performed by: NURSE PRACTITIONER

## 2021-06-08 PROCEDURE — 87086 URINE CULTURE/COLONY COUNT: CPT | Performed by: NURSE PRACTITIONER

## 2021-06-08 PROCEDURE — 86803 HEPATITIS C AB TEST: CPT | Performed by: NURSE PRACTITIONER

## 2021-06-08 PROCEDURE — 84702 CHORIONIC GONADOTROPIN TEST: CPT | Performed by: NURSE PRACTITIONER

## 2021-06-08 PROCEDURE — 80306 DRUG TEST PRSMV INSTRMNT: CPT | Performed by: NURSE PRACTITIONER

## 2021-06-08 PROCEDURE — 80081 OBSTETRIC PANEL INC HIV TSTG: CPT | Performed by: NURSE PRACTITIONER

## 2021-06-08 PROCEDURE — 87661 TRICHOMONAS VAGINALIS AMPLIF: CPT | Performed by: NURSE PRACTITIONER

## 2021-06-08 PROCEDURE — 86901 BLOOD TYPING SEROLOGIC RH(D): CPT | Performed by: NURSE PRACTITIONER

## 2021-06-08 PROCEDURE — 87591 N.GONORRHOEAE DNA AMP PROB: CPT | Performed by: NURSE PRACTITIONER

## 2021-06-08 PROCEDURE — 86762 RUBELLA ANTIBODY: CPT | Performed by: NURSE PRACTITIONER

## 2021-06-08 PROCEDURE — 82950 GLUCOSE TEST: CPT | Performed by: NURSE PRACTITIONER

## 2021-06-08 PROCEDURE — 36415 COLL VENOUS BLD VENIPUNCTURE: CPT

## 2021-06-08 PROCEDURE — 86850 RBC ANTIBODY SCREEN: CPT | Performed by: NURSE PRACTITIONER

## 2021-06-08 RX ORDER — LANCETS
EACH MISCELLANEOUS
Qty: 120 EACH | Refills: 12 | Status: SHIPPED | OUTPATIENT
Start: 2021-06-08

## 2021-06-08 RX ORDER — FLUTICASONE PROPIONATE 50 MCG
2 SPRAY, SUSPENSION (ML) NASAL DAILY
COMMUNITY
Start: 2021-05-12 | End: 2021-06-08 | Stop reason: HOSPADM

## 2021-06-08 RX ORDER — GLUCOSAMINE HCL/CHONDROITIN SU 500-400 MG
1 CAPSULE ORAL 4 TIMES DAILY
Qty: 120 EACH | Refills: 12 | Status: SHIPPED | OUTPATIENT
Start: 2021-06-08 | End: 2022-02-25

## 2021-06-08 RX ORDER — BLOOD-GLUCOSE METER
1 KIT MISCELLANEOUS TAKE AS DIRECTED
Qty: 1 EACH | Refills: 0 | Status: SHIPPED | OUTPATIENT
Start: 2021-06-08

## 2021-06-08 NOTE — PROGRESS NOTES
"The patient is a 36 years old who comes in today for her first prenatal appointment. She is accompanied by her boyfriend who is the father of the baby. She had a positive home pregnancy test. Her UPT in office today is positive. This is her 2nd pregnancy. She had a csection with her daughter in 2006 at 38wks gestation. She tells me her blood pressure was getting high, so they wanted her to deliver at 38wks gestation. The patient is unsure if she had GHTN or preeclampsia. She says she does not remember being told. She says her placenta \"tore away from uterus\", so Dr. Douglas did a csection. She tells me she had to have a blood transfusion after the csection. We do not have her records from 2006.   I spent approximately 60 minutes with the patient acquiring the health and history intake and discussing topics related to healthy lifestyle.The patient says she is on Labetalol twice a day for hypertension. She has also been diagnosed with peptic ulcer disease, diverticulitis, fibromyalgia, endometriosis, and PCOS. She has a history of depression also. She is currently on Prozac. Kaitlin SWEET wants her to wean off of the Prozac. She does not want another medicine called in. She says she will be fine without the Prozac. I told her if she starts having more problems and feels depressed to let the OB provider know. She filled out the depression screening questionnaire form today and scored \"1\". In January of this year, she had Covid. She says she still has trouble breathing and is currently on an inhaler for that. She says at that time, they told her she was prediabetic. She comes in today with a log of her fasting sugars. Kaitlin SWEET wants her to do a 1hr glucola today.   Her daughter was born with 3 holes in her heart. She did not have to have surgery for that. She also was born with strabismus and had 3 surgeries.    A newob bag is given. The 1st trimester teaching was done with the patient. We discussed a healthy " diet and exercise and what is recommended. I also discussed Listeriosis and Toxoplasmosis and what fish to avoid due to high mercury levels. I informed patient not to be in hot tubs, saunas, or tanning beds. We discussed that spotting may occur after intercourse which is common, but if heavy bleeding like a period occurs to call [facility] or hospital if clinic is closed.  I encouraged her to make an appointment with the dentist if she has not had a dental exam and cleaning in the last 6 months. The patient denies use of alcohol, illicit drug use, and tobacco smoking. She says she had an occasional drink prior to pregnancy. She plans to breastfeed. She  her daughter until she developed mastitis.  I gave her pamphlet on breastfeeding classes and the breastfeeding mothers support group. These services are provided by Clari Barrios, Lactation Consultant. She filled out the health department referral form and is interested in [place]. I encouraged the patient to get the TDAP vaccine in the 3rd trimester.  I discussed with the patient that a pediatrician needs to be chosen prior to delivery for the infant to have an appointment scheduled before leaving the hospital.  I discussed lab tests will be done today. She is doing the 1hr glucola today. A 24hr urine protein and CMP are also ordered. She thought she had a pap smear in October 2020, but no pap result is found. All questions were answered at this time. According to her LMP, she is 5wks 6days gestation today. She will come back in about 3 weeks for her ultrasound and to see the provider. She is made an appointment for 6/29/21 for ultrasound and to see Argenis SHARMA

## 2021-06-09 ENCOUNTER — TELEPHONE (OUTPATIENT)
Dept: OBSTETRICS AND GYNECOLOGY | Facility: CLINIC | Age: 36
End: 2021-06-09

## 2021-06-09 LAB
BACTERIA SPEC AEROBE CULT: NORMAL
BILIRUB UR QL STRIP: NEGATIVE
C TRACH RRNA CVX QL NAA+PROBE: NEGATIVE
CLARITY UR: ABNORMAL
COLOR UR: YELLOW
GLUCOSE UR STRIP-MCNC: NEGATIVE MG/DL
HGB UR QL STRIP.AUTO: NEGATIVE
KETONES UR QL STRIP: NEGATIVE
LEUKOCYTE ESTERASE UR QL STRIP.AUTO: NEGATIVE
N GONORRHOEA RRNA SPEC QL NAA+PROBE: NEGATIVE
NITRITE UR QL STRIP: NEGATIVE
PH UR STRIP.AUTO: 5.5 [PH] (ref 5–8)
PROT UR QL STRIP: NEGATIVE
RPR SER QL: NORMAL
RUBV IGG SERPL IA-ACNC: 2.83 INDEX
SP GR UR STRIP: 1.02 (ref 1–1.03)
TRICHOMONAS VAGINALIS PCR: NEGATIVE
UROBILINOGEN UR QL STRIP: ABNORMAL

## 2021-06-09 RX ORDER — PNV NO.95/FERROUS FUM/FOLIC AC 28MG-0.8MG
1 TABLET ORAL DAILY
Qty: 90 TABLET | Refills: 4 | Status: SHIPPED | OUTPATIENT
Start: 2021-06-09 | End: 2022-02-25

## 2021-06-09 NOTE — TELEPHONE ENCOUNTER
PT SAID THAT HER PRESCRIPTION FOR PRENATAL VITAMINS WERE NOT WITH HER OTHER PRESCRIPTIONS. PT IS ASKING THAT THEY WOULD BE SENT IN.

## 2021-06-18 ENCOUNTER — ROUTINE PRENATAL (OUTPATIENT)
Dept: OBSTETRICS AND GYNECOLOGY | Facility: CLINIC | Age: 36
End: 2021-06-18

## 2021-06-18 VITALS — SYSTOLIC BLOOD PRESSURE: 112 MMHG | BODY MASS INDEX: 47.2 KG/M2 | WEIGHT: 275 LBS | DIASTOLIC BLOOD PRESSURE: 70 MMHG

## 2021-06-18 DIAGNOSIS — O09.521 HIGH RISK PREGNANCY, MULTIGRAVIDA OF ADVANCED MATERNAL AGE IN FIRST TRIMESTER: ICD-10-CM

## 2021-06-18 DIAGNOSIS — O10.911 MATERNAL CHRONIC HYPERTENSION IN FIRST TRIMESTER: Primary | ICD-10-CM

## 2021-06-18 DIAGNOSIS — O26.891 RH NEGATIVE STATUS DURING PREGNANCY IN FIRST TRIMESTER: ICD-10-CM

## 2021-06-18 DIAGNOSIS — O24.111 PREGNANCY WITH TYPE 2 DIABETES MELLITUS IN FIRST TRIMESTER: ICD-10-CM

## 2021-06-18 DIAGNOSIS — Z67.91 RH NEGATIVE STATUS DURING PREGNANCY IN FIRST TRIMESTER: ICD-10-CM

## 2021-06-18 DIAGNOSIS — Z3A.01 LESS THAN 8 WEEKS GESTATION OF PREGNANCY: ICD-10-CM

## 2021-06-18 PROCEDURE — 99204 OFFICE O/P NEW MOD 45 MIN: CPT | Performed by: NURSE PRACTITIONER

## 2021-06-18 RX ORDER — ONDANSETRON 8 MG/1
8 TABLET, ORALLY DISINTEGRATING ORAL EVERY 8 HOURS PRN
Qty: 20 TABLET | Refills: 4 | Status: SHIPPED | OUTPATIENT
Start: 2021-06-18 | End: 2021-06-25 | Stop reason: SINTOL

## 2021-06-18 RX ORDER — METFORMIN HYDROCHLORIDE 500 MG/1
500 TABLET, EXTENDED RELEASE ORAL 2 TIMES DAILY
Qty: 60 TABLET | Refills: 4 | Status: SHIPPED | OUTPATIENT
Start: 2021-06-18 | End: 2021-06-25

## 2021-06-18 NOTE — PROGRESS NOTES
[facility]  Obstetrics Visit    CHIEF COMPLAINT:  New prenatal visit; hx reviewed and update. Pt has brought blood sugar log for review today.     HISTORY OF PRESENT ILLNESS:  Donna Linder is a 36 y.o. y/o  at 7w2d by LMP (Patient's last menstrual period was 2021 (exact date).).  This was an unplanned pregnancy and the patient is supported by her partner.  Reports  nausea without vomiting.  Reports breast tenderness.  She denies any vaginal bleeding.  She has started taking a prenatal vitamin.    REVIEW OF SYSTEMS  Review of Systems   Constitutional: Negative for activity change, appetite change, chills, diaphoresis, fatigue, fever, unexpected weight gain and unexpected weight loss.   Respiratory: Negative for chest tightness and shortness of breath.    Cardiovascular: Negative for chest pain and palpitations.   Gastrointestinal: Positive for nausea. Negative for abdominal distention, abdominal pain, constipation, diarrhea and vomiting.   Endocrine: Negative.    Genitourinary: Negative for amenorrhea, breast discharge, breast lump, breast pain, decreased libido, dyspareunia, dysuria, menstrual problem, pelvic pain, vaginal bleeding, vaginal discharge and vaginal pain.   Musculoskeletal: Negative for myalgias.   Skin: Negative for color change, dry skin and skin lesions.   Neurological: Negative for light-headedness and headache.   Psychiatric/Behavioral: Negative for agitation, dysphoric mood, sleep disturbance, depressed mood and stress. The patient is not nervous/anxious.        PRENATAL RISK FACTORS   Problems (from 21 to present)     Problem Noted Resolved    Maternal chronic hypertension in first trimester 2021 by Kaitlin Whyte, APRN No    High risk pregnancy, multigravida of advanced maternal age in first trimester 2021 by Kaitlin Whyte, APRN No    Rh negative status during pregnancy in first trimester 2021 by Kaitlin Whyte, APRN No    Pregnancy  "with type 2 diabetes mellitus in first trimester 2021 by Kaitlin Whyte APRN No    Overview Signed 2021  4:30 PM by Kaitlin Whyte APRN     Dx at 5w6d on - A1c- 5.8               DATING CRITERIA:  LMP (21) -- FRITZ 22  1TUS scheduled for next week    OBSTETRIC HISTORY:  OB History    Para Term  AB Living   2 1 1     1   SAB TAB Ectopic Molar Multiple Live Births             1      # Outcome Date GA Lbr Dereck/2nd Weight Sex Delivery Anes PTL Lv   2 Current            1 Term 06 38w0d  2892 g (6 lb 6 oz) F CS-LTranv EPI, Spinal  HIEU      Birth Comments: Patient says her placenta \"tore away from uterus\". had blood transfusion after delivery. pt says her blood pressure was high at end of pregnancy. (we do not have records available)     GYN HISTORY:  Denies h/o sexually transmitted infections/pelvic inflammatory disease  Denies h/o abnormal pap smears  Last pap smear: 3/3/15 NIL  Last Completed Pap Smear     This patient has no relevant Health Maintenance data.        Denies h/o gynecologic surgeries, including biopsies of the cervix    PAST MEDICAL HISTORY:  Past Medical History:   Diagnosis Date   • Anemia    • Depression    • Diverticulitis    • Diverticulitis    • Endometriosis    • Fibromyalgia    • Gestational diabetes mellitus (GDM) in first trimester 2021   • History of transfusion    • Hypertension    • Kidney stones    • Ovarian cyst    • PCOS (polycystic ovarian syndrome)    • Peptic ulcer disease    • Polycystic ovary syndrome    • Urogenital trichomoniasis    • Varicella      PAST SURGICAL HISTORY:  Past Surgical History:   Procedure Laterality Date   • APPENDECTOMY     • BREAST EXCISIONAL BIOPSY Left    •  SECTION     • WRIST ARTHROSCOPY W/ TRIANGULAR FIBROCARTILAGE REPAIR Left      FAMILY HISTORY:  Family History   Problem Relation Age of Onset   • Hypertension Mother    • Diabetes Mother    • Hypertension Father    • Diabetes Father    • Kidney cancer " Father    • Diabetes Sister    • Strabismus Daughter    • No Known Problems Maternal Grandfather    • Diabetes Paternal Grandmother    • Colon cancer Paternal Grandfather    • No Known Problems Sister      SOCIAL HISTORY:  Social History     Socioeconomic History   • Marital status: Single     Spouse name: Not on file   • Number of children: Not on file   • Years of education: Not on file   • Highest education level: Not on file   Tobacco Use   • Smoking status: Never Smoker   • Smokeless tobacco: Never Used   Vaping Use   • Vaping Use: Never used   Substance and Sexual Activity   • Alcohol use: Not Currently     Comment: occasionally    • Drug use: No   • Sexual activity: Yes     Partners: Male     Comment: no recent pap smear     GENETIC SCREENING:  Age >34 yo as of FRITZ: YES  Thalassemia: no  NTD: no  CHD: no  Down Syndrome/MR/Fragile X/Autism: no  Ashkenazi Yazdanism with Frank-Sachs, [place], familial dysautonomia: no  Sickle cell disease or trait: no  Hemophilia: no  Muscular dystrophy: no  Cystic fibrosis: no  Rissa's chorea: no  Birth defects: no  Genetic/chromosomal disorders: no    INFECTION HISTORY:  TB exposure: no  HSV: no  Illness since LMP: no  Prior GBS infected child: no  STIs: no    ALLERGIES:  Allergies   Allergen Reactions   • Penicillins Other (See Comments)     unknown       MEDICATIONS:  Prior to Admission medications    Medication Sig Start Date End Date Taking? Authorizing Provider   Alcohol Swabs 70 % pads 1 each 4 (Four) Times a Day. 6/8/21   Kaitlin Whyte APRN   glucose blood test strip Test blood sugar fasting and 1 hour after each meal (QID). 6/8/21   Kaitlin Whyte APRN   glucose monitor monitoring kit 1 each Take As Directed. 6/8/21   Kaitlin Whyte APRN   labetalol (NORMODYNE) 100 MG tablet Take 100 mg by mouth 2 (Two) Times a Day.    Provider, MD Drew   Lancets (onetouch ultrasoft) lancets Test blood sugar fasting and 1 hour after each meal (QID) 6/8/21   Kaitlin Whyte  KEE   metFORMIN ER (GLUCOPHAGE-XR) 500 MG 24 hr tablet Take 1 tablet by mouth 2 (two) times a day. 21   Kaitlin Whyte APRN   ondansetron ODT (Zofran ODT) 8 MG disintegrating tablet Place 1 tablet on the tongue Every 8 (Eight) Hours As Needed for Nausea or Vomiting. 21   Kaitlin Whyte APRN   Prenatal Vit-Fe Fumarate-FA (Prenatal Vitamin) 27-0.8 MG tablet Take 1 tablet by mouth Daily. 21   Kaitlin Whyte APRN   ProAir  (90 Base) MCG/ACT inhaler INHALE 2 PUFFS BY MOUTH EVERY 8 HOURS AS NEEDED 21   Provider, MD Drew       PHYSICAL EXAM:   /70   Wt 125 kg (275 lb)   LMP 2021 (Exact Date)   BMI 47.20 kg/m²   General: Alert, healthy, no distress, well nourished and well developed.  Neurologic: Alert, oriented to person, place, and time.  Gait normal.  Cranial nerves II-XII grossly intact.  HEENT: Normocephalic, atraumatic.  Extraocular muscles intact, pupils equal and reactive x2.    Teeth: Normal hygiene.  Neck: Supple, no adenopathy, thyroid normal size, non-tender, without nodularity, trachea midline.  Breasts: No masses, skin dimpling, skin retraction, nipple discharge, or asymmetry bilaterally.  Lungs: Normal respiratory effort.  Clear to auscultation bilaterally.  No wheezes, rhonci, or rales.  Heart: Regular rate and rhythm.  No murmer, rub or gallop.  Abdomen: Soft, non-tender, non-distended,no masses, no hepatosplenomegaly, no hernia.  Skin: No rash, no lesions.  Extremities: No cyanosis, clubbing or edema.  PELVIC EXAM:  External Genitalia/Vulva: Anatomy is normal, no significant redness of labia, no discharge on vulvar tissues, Harrells's and Bartholin's glands are normal, no ulcers, no condylomatous lesions.  Urethra: Normal, no lesions.      Bedside ultrasound performed by myself which shows the findings below:  Single, viable IUP with FHR of 148bpm    IMPRESSION:  Donna Linder is a 36 y.o.  at 7w2d for a new prenatal visit.    PLAN:  1.  IUP at  "7w2d  - Options counseling performed and patient desires continuation of pregnancy to term   - Prenatal labs ordered  - Genetic testing, including cystic fibrosis, was discussed and patient is considering  - Continue prenatal vitamins  - Weight gain counseling performed.   - Pregravid BMI >30: Recommend 11-20 lb  - Return to clinic in 4 weeks for return prenatal visit  - Reviewed COVID-19 visitation policy  - Reviewed COVID-19 precautions     Diagnosis Plan   1. Maternal chronic hypertension in first trimester  Continue labetalol BID   2. Rh negative status during pregnancy in first trimester  Rhogam at 28 weeks. Bleeding cautions reviewed.    3. Pregnancy with type 2 diabetes mellitus in first trimester  Pt was \"pre-diabetic\" prior to pregnancy and had not had her follow up visit prior to finding out she was pregnant. Her BS log today is essentially all out of normal range. Pt to start on metformin 500mg BID and continue to check BS QID. Pt to f/u in 5-7 days to recheck log again. Diabetes education information provided. Encouraged her to start limiting sugar and carbs in her diet.   4. Less than 8 weeks gestation of pregnancy     5. High risk pregnancy, multigravida of advanced maternal age in first trimester  Considering NIPT     KEE Chamberlain  6/22/2021  12:46 CDT      "

## 2021-06-22 PROBLEM — O10.911 MATERNAL CHRONIC HYPERTENSION IN FIRST TRIMESTER: Status: ACTIVE | Noted: 2021-06-22

## 2021-06-22 PROBLEM — O09.521 MULTIGRAVIDA OF ADVANCED MATERNAL AGE IN FIRST TRIMESTER: Status: ACTIVE | Noted: 2021-06-22

## 2021-06-22 PROBLEM — O09.521 HIGH RISK PREGNANCY, MULTIGRAVIDA OF ADVANCED MATERNAL AGE IN FIRST TRIMESTER: Status: ACTIVE | Noted: 2021-06-22

## 2021-06-25 ENCOUNTER — ROUTINE PRENATAL (OUTPATIENT)
Dept: OBSTETRICS AND GYNECOLOGY | Facility: CLINIC | Age: 36
End: 2021-06-25

## 2021-06-25 VITALS — WEIGHT: 277 LBS | SYSTOLIC BLOOD PRESSURE: 128 MMHG | BODY MASS INDEX: 47.55 KG/M2 | DIASTOLIC BLOOD PRESSURE: 84 MMHG

## 2021-06-25 DIAGNOSIS — Z3A.08 8 WEEKS GESTATION OF PREGNANCY: ICD-10-CM

## 2021-06-25 DIAGNOSIS — O24.111 PREGNANCY WITH TYPE 2 DIABETES MELLITUS IN FIRST TRIMESTER: Primary | ICD-10-CM

## 2021-06-25 DIAGNOSIS — O26.891 RH NEGATIVE STATUS DURING PREGNANCY IN FIRST TRIMESTER: ICD-10-CM

## 2021-06-25 DIAGNOSIS — O09.521 HIGH RISK PREGNANCY, MULTIGRAVIDA OF ADVANCED MATERNAL AGE IN FIRST TRIMESTER: ICD-10-CM

## 2021-06-25 DIAGNOSIS — O21.9 NAUSEA AND VOMITING DURING PREGNANCY PRIOR TO 22 WEEKS GESTATION: ICD-10-CM

## 2021-06-25 DIAGNOSIS — O10.911 MATERNAL CHRONIC HYPERTENSION IN FIRST TRIMESTER: ICD-10-CM

## 2021-06-25 DIAGNOSIS — Z67.91 RH NEGATIVE STATUS DURING PREGNANCY IN FIRST TRIMESTER: ICD-10-CM

## 2021-06-25 PROCEDURE — 99214 OFFICE O/P EST MOD 30 MIN: CPT | Performed by: NURSE PRACTITIONER

## 2021-06-25 RX ORDER — DIPHENHYDRAMINE HYDROCHLORIDE 25 MG/1
25 CAPSULE ORAL 2 TIMES DAILY
Qty: 60 TABLET | Refills: 3 | Status: SHIPPED | OUTPATIENT
Start: 2021-06-25 | End: 2021-10-28 | Stop reason: SDUPTHER

## 2021-06-25 RX ORDER — METFORMIN HYDROCHLORIDE 500 MG/1
TABLET, EXTENDED RELEASE ORAL
Qty: 90 TABLET | Refills: 3 | Status: SHIPPED | OUTPATIENT
Start: 2021-06-25 | End: 2021-07-30

## 2021-06-25 RX ORDER — ONDANSETRON 4 MG/1
4 TABLET, FILM COATED ORAL DAILY PRN
Qty: 30 TABLET | Refills: 1 | Status: SHIPPED | OUTPATIENT
Start: 2021-06-25 | End: 2021-09-22

## 2021-06-25 NOTE — PROGRESS NOTES
CC: Prenatal visit    Donna Linder is a 36 y.o.  at 8w2d.  Here for glucose log check; fasting glucose for the last 7 days have been all elevated, normal 1 hr postprandial sugars except one elevated at 140. Pt is taking metformin BID.   Denies contractions, LOF, or VB. Requesting PO form of zofran instead of the ODT as it makes the nausea worse.     /84   Wt 126 kg (277 lb)   LMP 2021 (Exact Date)   BMI 47.55 kg/m²   SVE: Deferred     Fetal Heart Rate: +HR     Problems (from 21 to present)     Problem Noted Resolved    Nausea and vomiting during pregnancy prior to 22 weeks gestation 2021 by Argenis Winston APRN No    Maternal chronic hypertension in first trimester 2021 by Kaitlin Whyte APRN No    High risk pregnancy, multigravida of advanced maternal age in first trimester 2021 by Kaitlin Whyte APRN No    Rh negative status during pregnancy in first trimester 2021 by Kaitlin Whyte APRN No    Pregnancy with type 2 diabetes mellitus in first trimester 2021 by Kaitlin Whyte APRN No    Overview Addendum 2021 12:16 PM by Argenis Winston APRN     Dx at 5w6d on - A1c- 5.8    : Elevated fasting glucose. Metformin increased to 1000mg at bedtime and 500mg in the morning per Dr. Cummins         Previous Version          A/P: Donna Linder is a 36 y.o.  at 8w2d.  - Consulted with Dr. Cummins; metformin 500 mg BID increased to metformin 1000mg at bedtime and 500mg in the morning. New rx sent.  - Vitamin B6 and Zofran PO rx sent  - Reviewed NOB labs  - Pap records request faxed; per pt had Pap in 2019 at another doctor's office.   - Keep Dating US appt 2021 and return in 4 weeks for JURGEN appt  - Reviewed COVID-19 visitation policy  - Reviewed COVID-19 precautions     Diagnosis Plan   1. Pregnancy with type 2 diabetes mellitus in first trimester     2. 8 weeks gestation of pregnancy     3. Nausea and vomiting during  pregnancy prior to 22 weeks gestation     4. High risk pregnancy, multigravida of advanced maternal age in first trimester     5. Rh negative status during pregnancy in first trimester     6. Maternal chronic hypertension in first trimester       Argenis Romero, APRN  6/25/2021  12:25 CDT

## 2021-07-04 ENCOUNTER — HOSPITAL ENCOUNTER (EMERGENCY)
Facility: HOSPITAL | Age: 36
Discharge: HOME | End: 2021-07-04
Attending: EMERGENCY MEDICINE | Admitting: EMERGENCY MEDICINE

## 2021-07-04 ENCOUNTER — APPOINTMENT (OUTPATIENT)
Dept: ULTRASOUND IMAGING | Facility: HOSPITAL | Age: 36
End: 2021-07-04

## 2021-07-04 VITALS
HEART RATE: 84 BPM | OXYGEN SATURATION: 98 % | RESPIRATION RATE: 20 BRPM | DIASTOLIC BLOOD PRESSURE: 78 MMHG | TEMPERATURE: 98.4 F | SYSTOLIC BLOOD PRESSURE: 154 MMHG

## 2021-07-04 DIAGNOSIS — D21.9 FIBROID: ICD-10-CM

## 2021-07-04 DIAGNOSIS — O26.891 ABDOMINAL PAIN IN PREGNANCY, FIRST TRIMESTER: Primary | ICD-10-CM

## 2021-07-04 DIAGNOSIS — R10.9 ABDOMINAL PAIN IN PREGNANCY, FIRST TRIMESTER: Primary | ICD-10-CM

## 2021-07-04 LAB
ANION GAP SERPL CALCULATED.3IONS-SCNC: 12 MMOL/L (ref 5–15)
BASOPHILS # BLD AUTO: 0.05 10*3/MM3 (ref 0–0.2)
BASOPHILS NFR BLD AUTO: 0.4 % (ref 0–1.5)
BUN SERPL-MCNC: 10 MG/DL (ref 6–20)
BUN/CREAT SERPL: 12.8 (ref 7–25)
CALCIUM SPEC-SCNC: 9.9 MG/DL (ref 8.6–10.5)
CHLORIDE SERPL-SCNC: 101 MMOL/L (ref 98–107)
CO2 SERPL-SCNC: 23 MMOL/L (ref 22–29)
CREAT SERPL-MCNC: 0.78 MG/DL (ref 0.57–1)
DEPRECATED RDW RBC AUTO: 45.8 FL (ref 37–54)
EOSINOPHIL # BLD AUTO: 0.13 10*3/MM3 (ref 0–0.4)
EOSINOPHIL NFR BLD AUTO: 1.1 % (ref 0.3–6.2)
ERYTHROCYTE [DISTWIDTH] IN BLOOD BY AUTOMATED COUNT: 14.7 % (ref 12.3–15.4)
GFR SERPL CREATININE-BSD FRML MDRD: 83 ML/MIN/1.73
GLUCOSE SERPL-MCNC: 95 MG/DL (ref 65–99)
HCG INTACT+B SERPL-ACNC: NORMAL MIU/ML
HCT VFR BLD AUTO: 31.4 % (ref 34–46.6)
HGB BLD-MCNC: 10.1 G/DL (ref 12–15.9)
IMM GRANULOCYTES # BLD AUTO: 0.04 10*3/MM3 (ref 0–0.05)
IMM GRANULOCYTES NFR BLD AUTO: 0.4 % (ref 0–0.5)
LYMPHOCYTES # BLD AUTO: 3.48 10*3/MM3 (ref 0.7–3.1)
LYMPHOCYTES NFR BLD AUTO: 30.7 % (ref 19.6–45.3)
MCH RBC QN AUTO: 27.4 PG (ref 26.6–33)
MCHC RBC AUTO-ENTMCNC: 32.2 G/DL (ref 31.5–35.7)
MCV RBC AUTO: 85.3 FL (ref 79–97)
MONOCYTES # BLD AUTO: 0.68 10*3/MM3 (ref 0.1–0.9)
MONOCYTES NFR BLD AUTO: 6 % (ref 5–12)
NEUTROPHILS NFR BLD AUTO: 6.97 10*3/MM3 (ref 1.7–7)
NEUTROPHILS NFR BLD AUTO: 61.4 % (ref 42.7–76)
NRBC BLD AUTO-RTO: 0 /100 WBC (ref 0–0.2)
PLATELET # BLD AUTO: 433 10*3/MM3 (ref 140–450)
PMV BLD AUTO: 8.9 FL (ref 6–12)
POTASSIUM SERPL-SCNC: 3.6 MMOL/L (ref 3.5–5.2)
RBC # BLD AUTO: 3.68 10*6/MM3 (ref 3.77–5.28)
SODIUM SERPL-SCNC: 136 MMOL/L (ref 136–145)
WBC # BLD AUTO: 11.35 10*3/MM3 (ref 3.4–10.8)

## 2021-07-04 PROCEDURE — 76817 TRANSVAGINAL US OBSTETRIC: CPT

## 2021-07-04 PROCEDURE — 99283 EMERGENCY DEPT VISIT LOW MDM: CPT

## 2021-07-04 PROCEDURE — 80048 BASIC METABOLIC PNL TOTAL CA: CPT | Performed by: EMERGENCY MEDICINE

## 2021-07-04 PROCEDURE — 85025 COMPLETE CBC W/AUTO DIFF WBC: CPT | Performed by: EMERGENCY MEDICINE

## 2021-07-04 PROCEDURE — 84702 CHORIONIC GONADOTROPIN TEST: CPT | Performed by: EMERGENCY MEDICINE

## 2021-07-04 RX ORDER — SODIUM CHLORIDE 0.9 % (FLUSH) 0.9 %
10 SYRINGE (ML) INJECTION AS NEEDED
Status: DISCONTINUED | OUTPATIENT
Start: 2021-07-04 | End: 2021-07-04 | Stop reason: HOSPADM

## 2021-07-04 NOTE — ED NOTES
Patient refused tele monitor, educated regarding importance of intervention, notified provider (Dr. Mckenna ).   Patient reports pelvic pain. States she is 9 weeks pregnant. Denies vaginal bleeding.      Christa Ware, RN  07/04/21 0334

## 2021-07-04 NOTE — ED PROVIDER NOTES
Subjective   36-year-old female G2, P1 approximately 9 weeks pregnant presents the emergency department with concern for lower abdominal cramping.  Reports she is concerned because she did not have pain like this previously and her first pregnancy had a placental abruption.  She reports she did carry that pregnancy to term.  No complications in this pregnancy thus far.  Denies any vaginal bleeding or discharge.  No urinary symptoms.  No fevers or chills.    Family history, surgical history, social history, current medications and allergies are reviewed with the patient and triage documentation and vitals are reviewed.      History provided by:  Patient   used: No        Review of Systems   Constitutional: Negative for chills and fever.   HENT: Negative for congestion and sore throat.    Eyes: Negative for photophobia and visual disturbance.   Respiratory: Negative for cough, shortness of breath and wheezing.    Cardiovascular: Negative for chest pain and palpitations.   Gastrointestinal: Positive for abdominal pain. Negative for diarrhea, nausea and vomiting.   Endocrine: Negative for polydipsia, polyphagia and polyuria.   Genitourinary: Negative for decreased urine volume, dysuria, frequency, urgency, vaginal bleeding and vaginal discharge.   Musculoskeletal: Negative for arthralgias, back pain, neck pain and neck stiffness.   Skin: Negative for rash and wound.   Allergic/Immunologic: Negative.    Neurological: Negative.    Hematological: Negative.    Psychiatric/Behavioral: Negative.        Past Medical History:   Diagnosis Date   • Anemia    • Depression    • Diverticulitis    • Diverticulitis    • Endometriosis    • Fibromyalgia    • Gestational diabetes mellitus (GDM) in first trimester 6/8/2021   • History of transfusion    • Hypertension    • Kidney stones    • Ovarian cyst    • PCOS (polycystic ovarian syndrome)    • Peptic ulcer disease    • Polycystic ovary syndrome    • Urogenital  trichomoniasis    • Varicella        Allergies   Allergen Reactions   • Penicillins Other (See Comments)     unknown       Past Surgical History:   Procedure Laterality Date   • APPENDECTOMY     • BREAST EXCISIONAL BIOPSY Left    •  SECTION     • WRIST ARTHROSCOPY W/ TRIANGULAR FIBROCARTILAGE REPAIR Left        Family History   Problem Relation Age of Onset   • Hypertension Mother    • Diabetes Mother    • Hypertension Father    • Diabetes Father    • Kidney cancer Father    • Diabetes Sister    • Strabismus Daughter    • No Known Problems Maternal Grandfather    • Diabetes Paternal Grandmother    • Colon cancer Paternal Grandfather    • No Known Problems Sister        Social History     Socioeconomic History   • Marital status: Single     Spouse name: Not on file   • Number of children: Not on file   • Years of education: Not on file   • Highest education level: Not on file   Tobacco Use   • Smoking status: Never Smoker   • Smokeless tobacco: Never Used   Vaping Use   • Vaping Use: Never used   Substance and Sexual Activity   • Alcohol use: Not Currently     Comment: occasionally    • Drug use: No   • Sexual activity: Yes     Partners: Male     Comment: no recent pap smear           Objective   Physical Exam  Vitals and nursing note reviewed.   Constitutional:       General: She is not in acute distress.     Appearance: Normal appearance. She is obese. She is not ill-appearing, toxic-appearing or diaphoretic.   HENT:      Head: Normocephalic.      Mouth/Throat:      Mouth: Mucous membranes are moist.      Pharynx: Oropharynx is clear.   Eyes:      Conjunctiva/sclera: Conjunctivae normal.      Pupils: Pupils are equal, round, and reactive to light.   Cardiovascular:      Rate and Rhythm: Normal rate and regular rhythm.      Pulses: Normal pulses.   Pulmonary:      Effort: Pulmonary effort is normal.      Breath sounds: Normal breath sounds.   Abdominal:      General: Bowel sounds are normal.      Palpations:  Abdomen is soft.      Tenderness: There is no abdominal tenderness. There is no guarding or rebound.   Musculoskeletal:         General: Normal range of motion.      Cervical back: Normal range of motion and neck supple.   Skin:     General: Skin is warm and dry.      Capillary Refill: Capillary refill takes less than 2 seconds.   Neurological:      General: No focal deficit present.      Mental Status: She is alert and oriented to person, place, and time.   Psychiatric:         Mood and Affect: Mood normal.         Behavior: Behavior normal.         Procedures  none         ED Course      Labs Reviewed   CBC WITH AUTO DIFFERENTIAL - Abnormal; Notable for the following components:       Result Value    WBC 11.35 (*)     RBC 3.68 (*)     Hemoglobin 10.1 (*)     Hematocrit 31.4 (*)     Lymphocytes, Absolute 3.48 (*)     All other components within normal limits   BASIC METABOLIC PANEL - Normal    Narrative:     GFR Normal >60  Chronic Kidney Disease <60  Kidney Failure <15     HCG, QUANTITATIVE, PREGNANCY    Narrative:     HCG Ranges by Gestational Age    Females - non-pregnant premenopausal   </= 1mIU/mL HCG  Females - postmenopausal               </= 7mIU/mL HCG    3 Weeks         5.8 -    71.2 mIU/mL  4 Weeks         9.5 -     750 mIU/mL  5 Weeks         217 -   7,138 mIU/mL  6 Weeks         158 -  31,795 mIU/mL  7 Weeks       3,697 - 163,563 mIU/mL  8 Weeks      32,065 - 149,571 mIU/mL  9 Weeks      63,803 - 151,410 mIU/mL  10 Weeks     46,509 - 186,977 mIU/mL  12 Weeks     27,832 - 210,612 mIU/mL  14 Weeks     13,950 -  62,530 mIU/mL  15 Weeks     12,039 -  70,971 mIU/mL  16 Weeks      9,040 -  56,451 mIU/mL  17 Weeks      8,175 -  55,868 mIU/mL  18 Weeks      8,099 -  58,176 mIU/mL  Results may be falsely decreased if patient taking Biotin.     CBC AND DIFFERENTIAL    Narrative:     The following orders were created for panel order CBC & Differential.  Procedure                               Abnormality          Status                     ---------                               -----------         ------                     CBC Auto Differential[[id number]]        Abnormal            Final result                 Please view results for these tests on the individual orders.     US Ob Transvaginal    Result Date: 7/4/2021  Narrative: Ultrasound OB pregnancy transvaginal on 7/4/2021 Clinical indications: Nine weeks pregnant, pelvic cramping, history of abruption COMPARISON: 6/29/2021 FINDINGS: Multiple sonographic images are obtained throughout the pelvis by transvaginal approach, both transverse and sagittal images are obtained. Cervical length measures approximately 3.4 cm and the cervix is closed although there is minimal fluid in the endocervical canal. Multiple nabothian cysts are noted in the cervix. Single early intrauterine pregnancy is identified with a gestational sac, yolk sac and fetal pole. Estimated gestational age by crown-rump length measurement is an approximate 10 week zero day gestation +/- six days. There has been appropriate growth when compared with the prior study. Gestational sac shape appears unremarkable. Gestation is too early for placental evaluation. Positive fetal cardiac activity is noted with a fetal heart rate 163 bpm. There is a probable fibroid in the anterior lower uterine segment measuring 2.1 cm in greatest dimension. The right ovary measures approximately 3.4 x 2.8 x 3.4 cm. Flow is demonstrated in the right ovary. Left ovary is not visualized. No adnexal mass or fluid collection is noted.     Impression: Single early living approximate 10 week intrauterine pregnancy with no acute abnormality noted. Electronically signed by:  Caleb Jameson  7/4/2021 3:06 AM CDT Workstation: [id number]    US Ob Transvaginal    Result Date: 6/29/2021  Narrative: Ultrasound OB transvaginal HISTORY: Single live intrauterine pregnancy. Crown-rump measurements 2.48 cm, 9 weeks, 1 day. Estimated date of delivery  by ultrasound January 31, 2022. Fetal heart rate 178 and regular rate. No subchorionic hemorrhage. The cervix is normal in length and closed 4 cm. Maternal ovaries are not identified.     Impression: Single live intrauterine pregnancy. 9 weeks, 1 day. Estimated date of delivery by ultrasound January 31, 2022. Electronically signed by:  Tien Meneses MD  6/29/2021 10:51 AM CDT Workstation: [id number]          MDM  Number of Diagnoses or Management Options     Amount and/or Complexity of Data Reviewed  Clinical lab tests: reviewed  Tests in the radiology section of CPT®: reviewed    Patient Progress  Patient progress: stable    Ultrasound reveals a 10-week approximate gestation with yolk sac and fetal pole.  Heart rate in the 160s.  Uterine fibroid.  Cervix is long and closed.  Appropriate interval increase in size.  hCG has increased appropriately.  Patient agreeable to discharge with close follow-up with OB.    Final diagnoses:   Abdominal pain in pregnancy, first trimester   Fibroid       ED Disposition  ED Disposition     ED Disposition Condition Comment    Discharge Stable           Argenis Winston, APRN  [address]  [address]LO[address]  [place] [address] [phone]               Medication List      No changes were made to your prescriptions during this visit.          Theron Quinteros,   07/04/21 0555

## 2021-07-30 ENCOUNTER — ROUTINE PRENATAL (OUTPATIENT)
Dept: OBSTETRICS AND GYNECOLOGY | Facility: CLINIC | Age: 36
End: 2021-07-30

## 2021-07-30 VITALS — DIASTOLIC BLOOD PRESSURE: 96 MMHG | SYSTOLIC BLOOD PRESSURE: 140 MMHG | BODY MASS INDEX: 47.2 KG/M2 | WEIGHT: 275 LBS

## 2021-07-30 DIAGNOSIS — O09.522 HIGH RISK PREGNANCY, MULTIGRAVIDA OF ADVANCED MATERNAL AGE IN SECOND TRIMESTER: ICD-10-CM

## 2021-07-30 DIAGNOSIS — D25.9 UTERINE FIBROID DURING PREGNANCY, ANTEPARTUM: Primary | ICD-10-CM

## 2021-07-30 DIAGNOSIS — D25.9 UTERINE FIBROIDS AFFECTING PREGNANCY IN SECOND TRIMESTER: ICD-10-CM

## 2021-07-30 DIAGNOSIS — Z3A.13 13 WEEKS GESTATION OF PREGNANCY: ICD-10-CM

## 2021-07-30 DIAGNOSIS — O34.10 UTERINE FIBROID DURING PREGNANCY, ANTEPARTUM: Primary | ICD-10-CM

## 2021-07-30 DIAGNOSIS — O34.12 UTERINE FIBROIDS AFFECTING PREGNANCY IN SECOND TRIMESTER: ICD-10-CM

## 2021-07-30 DIAGNOSIS — O24.112 PREGNANCY WITH TYPE 2 DIABETES MELLITUS IN SECOND TRIMESTER: ICD-10-CM

## 2021-07-30 PROBLEM — R10.2 PAIN OF ROUND LIGAMENT AFFECTING PREGNANCY, ANTEPARTUM: Status: RESOLVED | Noted: 2021-07-30 | Resolved: 2021-07-30

## 2021-07-30 PROBLEM — O26.899 PAIN OF ROUND LIGAMENT AFFECTING PREGNANCY, ANTEPARTUM: Status: ACTIVE | Noted: 2021-07-30

## 2021-07-30 PROBLEM — O26.899 PAIN OF ROUND LIGAMENT AFFECTING PREGNANCY, ANTEPARTUM: Status: RESOLVED | Noted: 2021-07-30 | Resolved: 2021-07-30

## 2021-07-30 PROBLEM — R10.2 PAIN OF ROUND LIGAMENT AFFECTING PREGNANCY, ANTEPARTUM: Status: ACTIVE | Noted: 2021-07-30

## 2021-07-30 PROCEDURE — 99214 OFFICE O/P EST MOD 30 MIN: CPT | Performed by: NURSE PRACTITIONER

## 2021-07-30 RX ORDER — METFORMIN HYDROCHLORIDE 500 MG/1
TABLET, EXTENDED RELEASE ORAL
Qty: 120 TABLET | Refills: 3 | Status: SHIPPED | OUTPATIENT
Start: 2021-07-30 | End: 2021-10-07

## 2021-07-30 NOTE — PROGRESS NOTES
CC: Prenatal visit    Donna Linder is a 36 y.o.  at 13w2d.  Doing well.  Denies contractions, LOF, or VB. Went to the ER on  for cramping and was told she has a uterine fibroid and to f/u with us. Reviewed US- pt has a 2cm fibroid in the lower uterine segment. Pt c/o of lower abdominal cramping that is every day. Concerned that this cramping is from the uterine fibroid. Discussed that this the fibroid is very small; cramping can just be normal pregnancy discomfort. Ensure adequate hydration. Will try pregnancy belt and refer to PT if cramping worsens. Did not bring sugar log but reports fasting sugars in the low 100s, improved from 120s. However, fasting never <95. 1 hr PP, 140 or less. Will increase metformin to 1500mg at bedtime and 500mg in the morning.    /96   Wt 125 kg (275 lb)   LMP 2021 (Exact Date)   Breastfeeding Unknown   BMI 47.20 kg/m²   SVE: Deferred     Fetal Heart Rate: 167 us     Problems (from 21 to 21)     Problem Noted Resolved    Nausea and vomiting during pregnancy prior to 22 weeks gestation 2021 by Argenis Winston APRN No    Maternal chronic hypertension in first trimester 2021 by Kaitlin Whyte APRN No    High risk pregnancy, multigravida of advanced maternal age in second trimester 2021 by Kaitlin Whyte APRN No    Rh negative status during pregnancy in first trimester 2021 by Kaitlin Whyte APRN No    Pregnancy with type 2 diabetes mellitus in second trimester 2021 by Kaitlin Whyte APRN No    Overview Addendum 2021 12:00 PM by Argenis Winston APRN     Dx at 5w6d on   EarlyGDM vs. Type 2 diabetes?  - A1c- 5.8    : Elevated fasting glucose. Metformin increased to 1000mg at bedtime and 500mg in the morning per Dr. Cummins    : Fasting sugars in the low 100s, improved from 120s. However,  Fasting never <95. 1 hr PP, 140 or less. Will increased to 1500mg at bedtime and 500mg in the  morning.             Previous Version    Pain of round ligament affecting pregnancy, antepartum 2021 by Argenis Winston APRN 2021 by Argenis Winston APRN    Overview Signed 2021 11:57 AM by Argenis Winston APRN     Cramping as of 13 weeks; pelvic belt given. Discussed physical therapy referral, pt declines at this time.                A/P: Donna Linder is a 36 y.o.  at 13w2d.  - Bedside US to determine FHR due to maternal habitus. FHr 167 bpm.  - Pregnancy belt given; pt declined to referral to physical therapy  - Metformin increased [id number] at bedtime and 500mg in the morning.  - Cramping precautions  - RTC in 4 weeks  - Reviewed COVID-19 visitation policy  - Reviewed COVID-19 precautions     Diagnosis Plan   1. Uterine fibroid during pregnancy, antepartum     2. 13 weeks gestation of pregnancy     3. High risk pregnancy, multigravida of advanced maternal age in second trimester     4. Pregnancy with type 2 diabetes mellitus in second trimester     5. Uterine fibroids affecting pregnancy in second trimester       KEE Altman  2021  12:12 CDT

## 2021-08-25 ENCOUNTER — ROUTINE PRENATAL (OUTPATIENT)
Dept: OBSTETRICS AND GYNECOLOGY | Facility: CLINIC | Age: 36
End: 2021-08-25

## 2021-08-25 ENCOUNTER — LAB (OUTPATIENT)
Dept: LAB | Facility: HOSPITAL | Age: 36
End: 2021-08-25

## 2021-08-25 VITALS — BODY MASS INDEX: 46.59 KG/M2 | SYSTOLIC BLOOD PRESSURE: 118 MMHG | DIASTOLIC BLOOD PRESSURE: 68 MMHG | WEIGHT: 271.4 LBS

## 2021-08-25 DIAGNOSIS — O24.112 PREGNANCY WITH TYPE 2 DIABETES MELLITUS IN SECOND TRIMESTER: ICD-10-CM

## 2021-08-25 DIAGNOSIS — O09.522 HIGH RISK PREGNANCY, MULTIGRAVIDA OF ADVANCED MATERNAL AGE IN SECOND TRIMESTER: ICD-10-CM

## 2021-08-25 DIAGNOSIS — R06.02 SHORTNESS OF BREATH DURING PREGNANCY: ICD-10-CM

## 2021-08-25 DIAGNOSIS — O21.9 NAUSEA AND VOMITING DURING PREGNANCY PRIOR TO 22 WEEKS GESTATION: ICD-10-CM

## 2021-08-25 DIAGNOSIS — O26.891 RH NEGATIVE STATUS DURING PREGNANCY IN FIRST TRIMESTER: ICD-10-CM

## 2021-08-25 DIAGNOSIS — Z3A.17 17 WEEKS GESTATION OF PREGNANCY: ICD-10-CM

## 2021-08-25 DIAGNOSIS — O10.911 MATERNAL CHRONIC HYPERTENSION IN FIRST TRIMESTER: ICD-10-CM

## 2021-08-25 DIAGNOSIS — R06.89 TROUBLE BREATHING: Primary | ICD-10-CM

## 2021-08-25 DIAGNOSIS — O99.891 SHORTNESS OF BREATH DURING PREGNANCY: ICD-10-CM

## 2021-08-25 DIAGNOSIS — Z67.91 RH NEGATIVE STATUS DURING PREGNANCY IN FIRST TRIMESTER: ICD-10-CM

## 2021-08-25 DIAGNOSIS — Z36.3 ENCOUNTER FOR ROUTINE SCREENING FOR FETAL MALFORMATION USING ULTRASOUND: ICD-10-CM

## 2021-08-25 LAB
DEPRECATED RDW RBC AUTO: 43.3 FL (ref 37–54)
ERYTHROCYTE [DISTWIDTH] IN BLOOD BY AUTOMATED COUNT: 14.2 % (ref 12.3–15.4)
HBA1C MFR BLD: 5 % (ref 4.8–5.6)
HCT VFR BLD AUTO: 33.1 % (ref 34–46.6)
HGB BLD-MCNC: 10.7 G/DL (ref 12–15.9)
MCH RBC QN AUTO: 27.6 PG (ref 26.6–33)
MCHC RBC AUTO-ENTMCNC: 32.3 G/DL (ref 31.5–35.7)
MCV RBC AUTO: 85.5 FL (ref 79–97)
PLATELET # BLD AUTO: 457 10*3/MM3 (ref 140–450)
PMV BLD AUTO: 9.8 FL (ref 6–12)
RBC # BLD AUTO: 3.87 10*6/MM3 (ref 3.77–5.28)
WBC # BLD AUTO: 14.32 10*3/MM3 (ref 3.4–10.8)

## 2021-08-25 PROCEDURE — 36415 COLL VENOUS BLD VENIPUNCTURE: CPT

## 2021-08-25 PROCEDURE — 99214 OFFICE O/P EST MOD 30 MIN: CPT | Performed by: OBSTETRICS & GYNECOLOGY

## 2021-08-25 PROCEDURE — 85027 COMPLETE CBC AUTOMATED: CPT

## 2021-08-25 PROCEDURE — 83036 HEMOGLOBIN GLYCOSYLATED A1C: CPT

## 2021-08-25 NOTE — PROGRESS NOTES
CC: Prenatal visit    Donna Linder is a 36 y.o.  at 17w0d.  Doing well.  Denies contractions, LOF, or VB.  Reports good FM.    /68   Wt 123 kg (271 lb 6.4 oz)   LMP 2021 (Exact Date)   BMI 46.59 kg/m²            Problems (from 21 to present)     Problem Noted Resolved    Shortness of breath during pregnancy 2021 by Bernardo Douglas MD No    Priority:  High      Overview Signed 2021 10:52 AM by Bernardo Douglas MD     Patient is short of breath during second trimester pregnancy but says it dates back to when she had Covid in January.  Is on albuterol inhaler         Nausea and vomiting during pregnancy prior to 22 weeks gestation 2021 by Argenis Winston APRN No    Maternal chronic hypertension in first trimester 2021 by Kaitlin Whyte APRN No    High risk pregnancy, multigravida of advanced maternal age in second trimester 2021 by Kaitlin Whyte APRN No    Rh negative status during pregnancy in first trimester 2021 by Kaitlin Whyte APRN No    Pregnancy with type 2 diabetes mellitus in second trimester 2021 by Kaitlin Whyte APRN No    Overview Addendum 2021 12:00 PM by Argenis Winston APRN     Dx at 5w6d on   EarlyGDM vs. Type 2 diabetes?  - A1c- 5.8    : Elevated fasting glucose. Metformin increased to 1000mg at bedtime and 500mg in the morning per Dr. Cummins    : Fasting sugars in the low 100s, improved from 120s. However,  Fasting never <95. 1 hr PP, 140 or less. Will increased to 1500mg at bedtime and 500mg in the morning.             Previous Version    Pain of round ligament affecting pregnancy, antepartum 2021 by Argenis Winston APRN 2021 by Argenis Winston APRN    Overview Signed 2021 11:57 AM by Argenis Winston APRN     Cramping as of 13 weeks; pelvic belt given. Discussed physical therapy referral, pt declines at this time.                A/P: Donna Linder is a 36 y.o.   at 17w0d.  - RTC in 4 weeks  - Reviewed COVID-19 visitation policy  - Reviewed COVID-19 precautions     Diagnosis Plan   1. Trouble breathing  Ambulatory Referral to Pulmonology   2. Nausea and vomiting during pregnancy prior to 22 weeks gestation     3. Maternal chronic hypertension in first trimester     4. High risk pregnancy, multigravida of advanced maternal age in second trimester     5. Rh negative status during pregnancy in first trimester     6. Pregnancy with type 2 diabetes mellitus in second trimester  Hemoglobin A1c her blood sugars are running good postprandial 2 hours in the 120s but her fastings are running in the high 90s discussed starting insulin is very resistant to this idea said will redouble efforts with diet we will check a hemoglobin A1c    CBC (No Diff)   7. Encounter for routine screening for fetal malformation using ultrasound  MFM OB US MAD   8. Shortness of breath during pregnancy   patient complains of shortness of breath since Covid in January.  Lungs are clear is responsive a Proventil inhaler is never smoked.  Working to send her to pulmonary for evaluation    Patient is short of breath during second trimester pregnancy but says it dates back to when she had Covid in January.  Is on albuterol inhaler   9. 17 weeks gestation of pregnancy     Given gestation will send to perinatology for anatomy scan at about 21 weeks to 22 weeks  Bernardo Douglas MD  2021  10:55 CDT

## 2021-09-22 ENCOUNTER — CONSULT (OUTPATIENT)
Dept: OBSTETRICS AND GYNECOLOGY | Facility: CLINIC | Age: 36
End: 2021-09-22

## 2021-09-22 ENCOUNTER — ROUTINE PRENATAL (OUTPATIENT)
Dept: OBSTETRICS AND GYNECOLOGY | Facility: CLINIC | Age: 36
End: 2021-09-22

## 2021-09-22 ENCOUNTER — LAB (OUTPATIENT)
Dept: LAB | Facility: HOSPITAL | Age: 36
End: 2021-09-22

## 2021-09-22 VITALS — WEIGHT: 268.6 LBS | DIASTOLIC BLOOD PRESSURE: 78 MMHG | BODY MASS INDEX: 46.11 KG/M2 | SYSTOLIC BLOOD PRESSURE: 126 MMHG

## 2021-09-22 DIAGNOSIS — O09.522 MULTIGRAVIDA OF ADVANCED MATERNAL AGE IN SECOND TRIMESTER: ICD-10-CM

## 2021-09-22 DIAGNOSIS — Z36.2 ENCOUNTER FOR REPEAT ULTRASOUND FOR FETAL HEART RATE: ICD-10-CM

## 2021-09-22 DIAGNOSIS — O26.891 RH NEGATIVE STATUS DURING PREGNANCY IN FIRST TRIMESTER: ICD-10-CM

## 2021-09-22 DIAGNOSIS — O24.112 PREGNANCY WITH TYPE 2 DIABETES MELLITUS IN SECOND TRIMESTER: Primary | ICD-10-CM

## 2021-09-22 DIAGNOSIS — O09.522 HIGH RISK PREGNANCY, MULTIGRAVIDA OF ADVANCED MATERNAL AGE IN SECOND TRIMESTER: Primary | ICD-10-CM

## 2021-09-22 DIAGNOSIS — Z3A.21 21 WEEKS GESTATION OF PREGNANCY: ICD-10-CM

## 2021-09-22 DIAGNOSIS — Z67.91 RH NEGATIVE STATUS DURING PREGNANCY IN FIRST TRIMESTER: ICD-10-CM

## 2021-09-22 DIAGNOSIS — O21.9 NAUSEA AND VOMITING DURING PREGNANCY PRIOR TO 22 WEEKS GESTATION: ICD-10-CM

## 2021-09-22 DIAGNOSIS — O09.522 HIGH RISK PREGNANCY, MULTIGRAVIDA OF ADVANCED MATERNAL AGE IN SECOND TRIMESTER: ICD-10-CM

## 2021-09-22 DIAGNOSIS — R06.02 SHORTNESS OF BREATH DURING PREGNANCY: ICD-10-CM

## 2021-09-22 DIAGNOSIS — O99.891 SHORTNESS OF BREATH DURING PREGNANCY: ICD-10-CM

## 2021-09-22 DIAGNOSIS — E66.01 MORBID OBESITY WITH BMI OF 45.0-49.9, ADULT (HCC): ICD-10-CM

## 2021-09-22 DIAGNOSIS — O10.912 MATERNAL CHRONIC HYPERTENSION IN SECOND TRIMESTER: ICD-10-CM

## 2021-09-22 DIAGNOSIS — O10.911 MATERNAL CHRONIC HYPERTENSION IN FIRST TRIMESTER: ICD-10-CM

## 2021-09-22 DIAGNOSIS — O24.112 PREGNANCY WITH TYPE 2 DIABETES MELLITUS IN SECOND TRIMESTER: ICD-10-CM

## 2021-09-22 LAB
T4 FREE SERPL-MCNC: 0.83 NG/DL (ref 0.93–1.7)
TSH SERPL DL<=0.05 MIU/L-ACNC: 1.39 UIU/ML (ref 0.27–4.2)

## 2021-09-22 PROCEDURE — 99214 OFFICE O/P EST MOD 30 MIN: CPT | Performed by: OBSTETRICS & GYNECOLOGY

## 2021-09-22 PROCEDURE — 84439 ASSAY OF FREE THYROXINE: CPT | Performed by: OBSTETRICS & GYNECOLOGY

## 2021-09-22 PROCEDURE — 84443 ASSAY THYROID STIM HORMONE: CPT | Performed by: OBSTETRICS & GYNECOLOGY

## 2021-09-22 PROCEDURE — 99241 PR OFFICE CONSULTATION NEW/ESTAB PATIENT 15 MIN: CPT | Performed by: OBSTETRICS & GYNECOLOGY

## 2021-09-22 PROCEDURE — 36415 COLL VENOUS BLD VENIPUNCTURE: CPT | Performed by: OBSTETRICS & GYNECOLOGY

## 2021-09-22 RX ORDER — ONDANSETRON 4 MG/1
4 TABLET, FILM COATED ORAL DAILY PRN
Qty: 30 TABLET | Refills: 1 | Status: SHIPPED | OUTPATIENT
Start: 2021-09-22 | End: 2021-09-28

## 2021-09-22 NOTE — PROGRESS NOTES
Thank you for requesting our service to provide consultation to Ms. Linder via telemedicine.  The patient has type 2 diabetes mellitus currently treated with Metformin 1500 mg per day.  The patient unfortunately is complaining of persistent diarrhea with this medication.  She notes fasting values ranging from 100-110 mg/dl and 1-hour postprandial values ranging from 123-130 mg/dl.  I noted that Metformin can be increased to a dose of 2000 mg per day but I am reluctant to utilize this increased dose given her complaints of diarrhea.  If additional medication is required for glucose control, I would offer her long-acting insulin therapy.      The patient was counseled concerning diabetes in pregnancy. We discussed the potential complications to both her and the fetus. We discussed the increased risk of birth defects in general with diabetes as well as the risk of cardiac defects in particular. I would recommend a fetal echocardiogram at 22-24 weeks' gestation to assess for fetal heart defects. I also discussed the risk for end-organ damage with uncontrolled diabetes including diabetic retinopathy, diabetic nephropathy and altered fetal growth.  She should have a baseline ophthalmologic evaluation and baseline 24-hour urine studies with repeat assessment in the 3rd trimester. A management plan was outlined and glucose control goals discussed.     I also provided consultation to Ms. Linder given her diagnosis of chronic hypertension. The frequency of chronic hypertension in pregnancy is estimated at 1% to 5%. It is more common in older obese women and in women of -American descent. Preexisting hypertension is a recognized risk factor for preeclampsia. Superimposed preeclampsia develops in 13-40% of women with chronic hypertension, depending on diagnostic criteria, etiology (essential versus secondary), duration, and the severity of hypertension. A major reason for this wide range in incidence is that the definition  of superimposed preeclampsia is used liberally in some studies. Pregnancies complicated by chronic hypertension are also at an increased risk for abruption placentae with the reported rate in women with mild chronic hypertension ranging from 0.7% to 2.7% and in those with severe hypertension may be as high as 5% to 10%. Both complications may result in significant maternal, fetal, and  morbidity and mortality.     Women with chronic hypertension who develop superimposed preeclampsia have higher rates of adverse maternal and fetal outcomes, but the independent risks associated with uncomplicated chronic hypertension are less clear. An analysis of 1,807 deliveries in women with chronic hypertension found that uncomplicated chronic hypertension was still associated with greater risk of  delivery (odds ratio [OR], 2.7) and postpartum hemorrhage (OR, 2.2) compared with women without hypertension (Tracy 2004). Other adverse maternal outcomes in women with chronic hypertension include accelerated hypertension with resultant target organ damage (eg, to the heart, brain, and kidneys), although in the absence of preeclampsia, this is extremely uncommon. Women with higher prepregnancy blood pressure or those with secondary hypertension are at greater risk for development of severe hypertension during pregnancy. Chronic hypertension is associated with an increased risk of gestational diabetes (OR, 1.8)). This may reflect similar risk factors for both conditions (obesity) as well as similar pathogenetic mechanisms (insulin resistance). The risk of placental abruption is increased threefold in women with chronic hypertension, although most of the increased risk is associated with superimposed preeclampsia (Sibai 1998).     The use of atenolol during the first and second trimesters has been associated with significantly reduced fetal growth along with decreased placental growth and weight. Combination alpha- and  beta-blocking agents such as labetalol have been associated with reduced fetal growth but to a lesser extent than that noted with pure beta-blockers such as atenolol. The available evidence suggests that the use of calcium channel-blockers, particularly nifedipine, in the first trimester has not been associated with increased rates of major birth defects or fetal growth restriction. Antihypertensive therapy with either nifedipine or labetalol can be initiated if the patient develops severe hypertension before term. Fetal evaluation for patients requiring antihypertensive therapy should include an ultrasound examination at 18-20 weeks' gestation and repeated at 28 weeks' gestation then monthly thereafter until term. The development of severe hypertension, preeclampsia or abnormal fetal growth requires immediate fetal testing with NST or BPP. Women who develop severe hypertension and those with documented fetal growth restriction by ultrasound examination require hospitalization and consideration of delivery. If superimposed preeclampsia is diagnosed at or beyond 37 weeks' gestation, delivery is undertaken.     I spent a total time of 15 minutes with this patient of which greater than 50% was face-to-face counseling and coordination of care. Questions asked by the patient were answered.     Ultrasound Report    Impression:  Size consistent with the previously assigned gestational age. Nasal bone is present.  No lemon or banana sign. Normal 4-chamber fetal cardiac view with normal right and left ventricular outflow tracts. It is too early, however, for a detailed fetal echocardiogram indicated given her pregestational diabetes.  Femur length + humerus length/foot length = 2.0 (normal). Cervical length = 5.5 cm. The anterior lower uterine segment is free of placenta.      Recommendation:  See consult note via telemedicine as above.  Follow-up with Dr. Douglas as scheduled. Would recommend baseline thyroid function tests  and 24-hour urine studies given her pregestational diabetes. She should also undergo an ophthalmologic evaluation to rule out diabetic retinopathy.  If glucose control worsens, would advocate initiation of insulin therapy rather than increasing her Metformin dose given the severity of her diarrhea.  Further, if her blood pressure control becomes inadequate, would suggest increasing labetalol to t.i.d. dosing before increasing the actual dose.  Would recommend fetal echocardiogram at 24 weeks gestation.

## 2021-09-24 PROBLEM — Z36.2: Status: ACTIVE | Noted: 2021-09-24

## 2021-09-24 PROBLEM — Z3A.21 21 WEEKS GESTATION OF PREGNANCY: Status: ACTIVE | Noted: 2021-09-24

## 2021-09-24 PROBLEM — O09.522 MULTIGRAVIDA OF ADVANCED MATERNAL AGE IN SECOND TRIMESTER: Status: ACTIVE | Noted: 2021-09-24

## 2021-09-24 PROBLEM — E66.01 MORBID OBESITY WITH BMI OF 45.0-49.9, ADULT: Status: ACTIVE | Noted: 2021-09-24

## 2021-09-24 NOTE — PROGRESS NOTES
CC: Prenatal visit    Donna Linder is a 36 y.o.  at 21w2d.  Doing well.  Denies contractions, LOF, or VB.  Reports good FM.    /78   Wt 122 kg (268 lb 9.6 oz)   LMP 2021 (Exact Date)   BMI 46.11 kg/m²              Problems (from 21 to present)     Problem Noted Resolved    Shortness of breath during pregnancy 2021 by Bernardo Douglas MD No    Priority:  High      Overview Signed 2021 10:52 AM by Bernardo Douglas MD     Patient is short of breath during second trimester pregnancy but says it dates back to when she had Covid in January.  Is on albuterol inhaler         Nausea and vomiting during pregnancy prior to 22 weeks gestation 2021 by Argenis Winston APRN No    Maternal chronic hypertension in second trimester 2021 by Kaitlin Whyte APRN No    High risk pregnancy, multigravida of advanced maternal age in second trimester 2021 by Kaitlin Whyte APRN No    Rh negative status during pregnancy in first trimester 2021 by Kaitlin Whyte APRN No    Pregnancy with type 2 diabetes mellitus in second trimester 2021 by Kaitlin Whyte APRN No    Overview Addendum 2021 12:00 PM by Argenis Winston APRN     Dx at 5w6d on   EarlyGDM vs. Type 2 diabetes?  - A1c- 5.8    : Elevated fasting glucose. Metformin increased to 1000mg at bedtime and 500mg in the morning per Dr. Cummins    : Fasting sugars in the low 100s, improved from 120s. However,  Fasting never <95. 1 hr PP, 140 or less. Will increased to 1500mg at bedtime and 500mg in the morning.             Previous Version    Pain of round ligament affecting pregnancy, antepartum 2021 by Argenis Winston APRN 2021 by Argenis Winston APRN    Overview Signed 2021 11:57 AM by Argenis Winston APRN     Cramping as of 13 weeks; pelvic belt given. Discussed physical therapy referral, pt declines at this time.                A/P: Donna Linder is a 36  y.o.  at 21w2d.  - RTC in 2 weeks  - Reviewed COVID-19 visitation policy  - Reviewed COVID-19 precautions     Diagnosis Plan   1. Pregnancy with type 2 diabetes mellitus in secon patient's blood sugars reviewed the postprandial ones are good but the fastings are in the low 100s.  Consultant recommended initiation of insulin.  Because of better resources for instruction on insulin injection and hypoglycemia precaution going to send to endocrine.  In the meantime will initiate Lantus 5 mg in addition to Metformin nightly if not bringing fasting down in 4 to 5 days increase to 10 Ambulatory MFM Referral to [facility] recommendation for thyroid function test this reviewed with patient order placed.  Recommendation for eye examination patient is going to make arrangements with optometrist or ophthalmologist of choice.  Recommendation for 24-hour urine protein order for this place.  Recommendation for echocardiogram [facility] will do this.  Recommendation for growth scan in 4weeks this will be done by perinatology group from [place]    TSH+Free T4    Protein, Urine, 24 Hour - Urine, Clean Catch    ECG 12 Lead    US Ob Follow Up Transabdominal Approach    US Ob Follow Up Transabdominal Approach    US fetal doppler echo complete    Ambulatory Referral to Endocrinology   2. Maternal chronic hypertension in first trimester  Ambulatory MFM Referral to [facility]    US Ob Follow Up Transabdominal Approach    US Ob Follow Up Transabdominal Approach    US fetal doppler echo complete patient is on labetalol.  Blood pressure is good today denies any headaches visual changes or epigastric pain   3. Multigravida of advanced maternal age in second trimester  Ambulatory MFM Referral to [facility].  Patient had anatomy scan today.  Patient's been offered diagnostic work-up such as amniocentesis and she is declined in the past again offered today risk benefits alternatives for aneuploidy in the past and today and again declines.  Offered  cell free DNA and again declines today would not abort any regard    US Ob Follow Up Transabdominal Approach    US Ob Follow Up Transabdominal Approach    US fetal doppler echo complete   4. Morbid obesity with BMI of 45.0-49.9, adult (CMS/HCC)  Ambulatory MFM Referral to [facility]    US Ob Follow Up Transabdominal Approach    US Ob Follow Up Transabdominal Approach    US fetal doppler echo complete   5. Shortness of breath during pregnancy  US Ob Follow Up Transabdominal Approach    US Ob Follow Up Transabdominal Approach    US fetal doppler echo complete this appointment pulmonary consultant   6. Nausea and vomiting during pregnancy prior to 22 weeks gestation     7. High risk pregnancy, multigravida of advanced maternal age in second trimester  US Ob Follow Up Transabdominal Approach    US Ob Follow Up Transabdominal Approach    US fetal doppler echo complete   8. Rh negative status during pregnancy in first trimester     9. Encounter for repeat ultrasound for fetal heart rate     10. 21 weeks gestation of pregnancy     Having some nausea still requests none [facility] [place] sending  Bernardo Douglas MD  9/24/2021  14:07 CDT

## 2021-09-28 ENCOUNTER — PROCEDURE VISIT (OUTPATIENT)
Dept: PULMONOLOGY | Facility: CLINIC | Age: 36
End: 2021-09-28

## 2021-09-28 ENCOUNTER — OFFICE VISIT (OUTPATIENT)
Dept: PULMONOLOGY | Facility: CLINIC | Age: 36
End: 2021-09-28

## 2021-09-28 VITALS
HEART RATE: 90 BPM | DIASTOLIC BLOOD PRESSURE: 80 MMHG | WEIGHT: 270 LBS | SYSTOLIC BLOOD PRESSURE: 128 MMHG | HEIGHT: 64 IN | TEMPERATURE: 96.4 F | BODY MASS INDEX: 46.1 KG/M2 | OXYGEN SATURATION: 99 %

## 2021-09-28 DIAGNOSIS — R06.02 SHORTNESS OF BREATH DURING PREGNANCY: ICD-10-CM

## 2021-09-28 DIAGNOSIS — O99.891 SHORTNESS OF BREATH DURING PREGNANCY: ICD-10-CM

## 2021-09-28 DIAGNOSIS — U09.9 POST-COVID SYNDROME: ICD-10-CM

## 2021-09-28 DIAGNOSIS — R06.09 DYSPNEA ON EXERTION: Primary | ICD-10-CM

## 2021-09-28 PROCEDURE — 94010 BREATHING CAPACITY TEST: CPT | Performed by: INTERNAL MEDICINE

## 2021-09-28 PROCEDURE — 99204 OFFICE O/P NEW MOD 45 MIN: CPT | Performed by: INTERNAL MEDICINE

## 2021-09-28 RX ORDER — FLUTICASONE PROPIONATE 220 UG/1
2 AEROSOL, METERED RESPIRATORY (INHALATION)
Qty: 1 EACH | Refills: 11 | Status: SHIPPED | OUTPATIENT
Start: 2021-09-28

## 2021-09-28 RX ORDER — ALBUTEROL SULFATE 90 UG/1
2 POWDER, METERED RESPIRATORY (INHALATION) EVERY 4 HOURS PRN
Qty: 1 EACH | Refills: 11 | Status: SHIPPED | OUTPATIENT
Start: 2021-09-28

## 2021-09-28 NOTE — PROGRESS NOTES
Pulmonary Consultation    Bernardo Douglas MD,    Thank you for asking me to see Donna Linder for   Chief Complaint   Patient presents with   • Shortness of Breath   .

## 2021-09-28 NOTE — PROGRESS NOTES
Pulmonary Consultation    Bernardo Douglas MD,    Thank you for asking me to see Donna Linder for   Chief Complaint   Patient presents with   • Shortness of Breath   .      History of Present Illness  Donna Linder is a 36 y.o. female     Patient referred from her OB for shortness of breath. Patient reports that she has been very short of breaht ever since getting COVID back in Jan. She was not hospitalized at that time. She hasn't had any pulmonary workup prior to this. She is now 5 months pregnant. She has no history of asthma. She is a never smoker.         Review of Systems: History obtained from chart review and the patient.  Review of Systems   Respiratory: Positive for shortness of breath. Negative for cough, choking and wheezing.      As described in the HPI. Otherwise, remainder of ROS (14 systems) were negative.    Patient Active Problem List   Diagnosis   • Acute pain of left wrist   • Rh negative status during pregnancy in first trimester   • Pregnancy with type 2 diabetes mellitus in second trimester   • Maternal chronic hypertension in second trimester   • High risk pregnancy, multigravida of advanced maternal age in second trimester   • Nausea and vomiting during pregnancy prior to 22 weeks gestation   • Uterine fibroids affecting pregnancy in second trimester   • Shortness of breath during pregnancy   • Encounter for repeat ultrasound for fetal heart rate   • 21 weeks gestation of pregnancy   • Multigravida of advanced maternal age in second trimester   • Morbid obesity with BMI of 45.0-49.9, adult (CMS/[facility])         Current Outpatient Medications:   •  Alcohol Swabs 70 % pads, 1 each 4 (Four) Times a Day., Disp: 120 each, Rfl: 12  •  glucose blood test strip, Test blood sugar fasting and 1 hour after each meal (QID)., Disp: 120 each, Rfl: 12  •  glucose monitor monitoring kit, 1 each Take As Directed., Disp: 1 each, Rfl: 0  •  Insulin Glargine (LANTUS SOLOSTAR) 100 UNIT/ML  injection pen, Inject 5 Units under the skin into the appropriate area as directed every night at bedtime., Disp: 1 pen, Rfl: 0  •  labetalol (NORMODYNE) 100 MG tablet, Take 100 mg by mouth 2 (Two) Times a Day., Disp: , Rfl:   •  Lancets (onetouch ultrasoft) lancets, Test blood sugar fasting and 1 hour after each meal (QID), Disp: 120 each, Rfl: 12  •  metFORMIN ER (GLUCOPHAGE-XR) 500 MG 24 hr tablet, Take 3 tablets at bedtime and 1 tablet in the morning., Disp: 120 tablet, Rfl: 3  •  Prenatal Vit-Fe Fumarate-FA (Prenatal Vitamin) 27-0.8 MG tablet, Take 1 tablet by mouth Daily., Disp: 90 tablet, Rfl: 4  •  ProAir  (90 Base) MCG/ACT inhaler, INHALE 2 PUFFS BY MOUTH EVERY 8 HOURS AS NEEDED, Disp: , Rfl:   •  vitamin B-6 (PYRIDOXINE) 25 MG tablet, Take 1 tablet by mouth 2 (two) times a day., Disp: 60 tablet, Rfl: 3  •  albuterol (ProAir RespiClick) 108 (90 Base) MCG/ACT inhaler, Inhale 2 puffs Every 4 (Four) Hours As Needed for Wheezing., Disp: 1 each, Rfl: 11  •  fluticasone (FLOVENT HFA) 220 MCG/ACT inhaler, Inhale 2 puffs 2 (Two) Times a Day., Disp: 1 each, Rfl: 11    Allergies   Allergen Reactions   • Penicillins Other (See Comments)     unknown       Past Medical History:   Diagnosis Date   • Anemia    • Depression    • Diverticulitis    • Diverticulitis    • Endometriosis    • Fibromyalgia    • Gestational diabetes mellitus (GDM) in first trimester 2021   • History of transfusion    • Hypertension    • Kidney stones    • Ovarian cyst    • PCOS (polycystic ovarian syndrome)    • Peptic ulcer disease    • Polycystic ovary syndrome    • Urogenital trichomoniasis    • Uterine fibroids affecting pregnancy in second trimester 2021   • Varicella      Past Surgical History:   Procedure Laterality Date   • APPENDECTOMY     • BREAST EXCISIONAL BIOPSY Left    •  SECTION     • WRIST ARTHROSCOPY W/ TRIANGULAR FIBROCARTILAGE REPAIR Left      Social History     Socioeconomic History   • Marital status:  "Single     Spouse name: Not on file   • Number of children: Not on file   • Years of education: Not on file   • Highest education level: Not on file   Tobacco Use   • Smoking status: Never Smoker   • Smokeless tobacco: Never Used   Vaping Use   • Vaping Use: Never used   Substance and Sexual Activity   • Alcohol use: Not Currently     Comment: occasionally    • Drug use: No   • Sexual activity: Yes     Partners: Male     Comment: no recent pap smear     Family History   Problem Relation Age of Onset   • Hypertension Mother    • Diabetes Mother    • Hypertension Father    • Diabetes Father    • Kidney cancer Father    • Diabetes Sister    • Strabismus Daughter    • No Known Problems Maternal Grandfather    • Diabetes Paternal Grandmother    • Colon cancer Paternal Grandfather    • No Known Problems Sister           Objective     Blood pressure 128/80, pulse 90, temperature 96.4 °F (35.8 °C), height 162.6 cm (64\"), weight 122 kg (270 lb), last menstrual period 04/28/2021, SpO2 99 %, unknown if currently breastfeeding.  Physical Exam  Vitals reviewed.   Constitutional:       Appearance: Normal appearance.   HENT:      Head: Normocephalic and atraumatic.      Nose: Nose normal.      Mouth/Throat:      Mouth: Mucous membranes are moist.      Pharynx: Oropharynx is clear.   Eyes:      Conjunctiva/sclera: Conjunctivae normal.      Pupils: Pupils are equal, round, and reactive to light.   Cardiovascular:      Rate and Rhythm: Normal rate and regular rhythm.      Pulses: Normal pulses.      Heart sounds: Normal heart sounds.   Pulmonary:      Effort: Pulmonary effort is normal.      Breath sounds: Normal breath sounds.   Abdominal:      Comments: Deferred as patient is pregnant   Musculoskeletal:         General: Normal range of motion.      Cervical back: Normal range of motion.   Skin:     General: Skin is warm and dry.   Neurological:      General: No focal deficit present.      Mental Status: She is alert and oriented to " person, place, and time.   Psychiatric:         Mood and Affect: Mood normal.         Behavior: Behavior normal.         PFTs:  (independently reviewed and interpreted by me)  9/28/21  FVC 3.72L, 109%  FEV1 3.06L, 110%  Ratio 82%    Radiology (independently reviewed and interpreted by me): none available     Assessment/Plan     Diagnoses and all orders for this visit:    1. Dyspnea on exertion (Primary)    2. Post-COVID syndrome    3. Shortness of breath during pregnancy  -     Spirometry Without Bronchodilator    Other orders  -     fluticasone (FLOVENT HFA) 220 MCG/ACT inhaler; Inhale 2 puffs 2 (Two) Times a Day.  Dispense: 1 each; Refill: 11  -     albuterol (ProAir RespiClick) 108 (90 Base) MCG/ACT inhaler; Inhale 2 puffs Every 4 (Four) Hours As Needed for Wheezing.  Dispense: 1 each; Refill: 11         Discussion/ Recommendations:   Explained to patient that I am somewhat limited in what I can do while she is pregnant. She already has an albuterol inhaler and that helps some. We did basic spirometry today and that was completely normal. It's possible she has pregnancy induced asthma and will try her on an ICS which have very good safety profiles in pregnancy. I explained that if she continues to have the same level of dyspnea after delivery, she is welcome to return and we can proceed with CT scan, full PFTs, possble MCT, etc. Patient is agreeable to this             No follow-ups on file.      Thank you for allowing me to participate in the care of Donna Linder. Please do not hesitate to contact me with any questions.         This document has been electronically signed by Yumiko Merlos DO on September 28, 2021 08:49 CDT

## 2021-09-28 NOTE — PROGRESS NOTES
Basic Steven only; good patient effort and cooperation.     Ordered by Chelita, read by Chelita.

## 2021-10-07 ENCOUNTER — OFFICE VISIT (OUTPATIENT)
Dept: ENDOCRINOLOGY | Facility: CLINIC | Age: 36
End: 2021-10-07

## 2021-10-07 VITALS
WEIGHT: 264 LBS | BODY MASS INDEX: 45.07 KG/M2 | SYSTOLIC BLOOD PRESSURE: 114 MMHG | DIASTOLIC BLOOD PRESSURE: 80 MMHG | HEART RATE: 90 BPM | HEIGHT: 64 IN | OXYGEN SATURATION: 98 %

## 2021-10-07 DIAGNOSIS — E11.65 TYPE 2 DIABETES MELLITUS WITH HYPERGLYCEMIA, WITHOUT LONG-TERM CURRENT USE OF INSULIN (HCC): Primary | ICD-10-CM

## 2021-10-07 PROCEDURE — 99204 OFFICE O/P NEW MOD 45 MIN: CPT | Performed by: INTERNAL MEDICINE

## 2021-10-07 RX ORDER — INSULIN GLARGINE 100 [IU]/ML
INJECTION, SOLUTION SUBCUTANEOUS
Qty: 3 PEN | Refills: 11 | Status: SHIPPED | OUTPATIENT
Start: 2021-10-07 | End: 2021-10-27

## 2021-10-07 RX ORDER — PEN NEEDLE, DIABETIC 30 GX3/16"
1 NEEDLE, DISPOSABLE MISCELLANEOUS 4 TIMES DAILY
Qty: 120 EACH | Refills: 11 | Status: SHIPPED | OUTPATIENT
Start: 2021-10-07

## 2021-10-07 RX ORDER — PROCHLORPERAZINE 25 MG/1
1 SUPPOSITORY RECTAL ONCE
Qty: 1 EACH | Refills: 3 | Status: SHIPPED | OUTPATIENT
Start: 2021-10-07 | End: 2021-10-07

## 2021-10-07 RX ORDER — PROCHLORPERAZINE 25 MG/1
1 SUPPOSITORY RECTAL ONCE
Qty: 1 EACH | Refills: 1 | Status: SHIPPED | OUTPATIENT
Start: 2021-10-07 | End: 2021-10-07

## 2021-10-07 RX ORDER — INSULIN LISPRO 100 [IU]/ML
INJECTION, SOLUTION INTRAVENOUS; SUBCUTANEOUS
Qty: 6 PEN | Refills: 11 | Status: ON HOLD | OUTPATIENT
Start: 2021-10-07 | End: 2022-01-24 | Stop reason: SDUPTHER

## 2021-10-07 RX ORDER — PROCHLORPERAZINE 25 MG/1
SUPPOSITORY RECTAL AS NEEDED
Qty: 9 EACH | Refills: 3 | Status: SHIPPED | OUTPATIENT
Start: 2021-10-07 | End: 2021-10-12 | Stop reason: SDUPTHER

## 2021-10-07 NOTE — PROGRESS NOTES
"Chief Complaint   Patient presents with   • Diabetes       History of Present Illness    36 y.o. female     Diabetes Type 2    Duration - noticed again during this pregnancy but preceding diabetes    Complications -  none  Present Monitoring -      Fingersticks -  4 x daily    CGM -     Present Regimen -  Metformin   Carb Intake -   Low carb  Exercise -   None  Symptoms -   None   ==========================================  Physical Exam  /80   Pulse 90   Ht 162.6 cm (64\")   Wt 120 kg (264 lb)   LMP 04/28/2021 (Exact Date)   SpO2 98%   BMI 45.32 kg/m²   AOx3  No goiter, no carotid bruit  RRR  CTA  Gravid Uterus   No Edema     ==========================================    Laboratory Workup    Lab Results   Component Value Date    WBC 14.32 (H) 08/25/2021    HGB 10.7 (L) 08/25/2021    HCT 33.1 (L) 08/25/2021    MCV 85.5 08/25/2021     (H) 08/25/2021       Lab Results   Component Value Date    GLUCOSE 95 07/04/2021    BUN 10 07/04/2021    CREATININE 0.78 07/04/2021    EGFRIFNONA 83 07/04/2021    EGFRIFAFRI 85 03/19/2021    BCR 12.8 07/04/2021    K 3.6 07/04/2021    CO2 23.0 07/04/2021    CALCIUM 9.9 07/04/2021    ALBUMIN 4.20 06/08/2021    AST 12 06/08/2021    ALT 16 06/08/2021       Lab Results   Component Value Date    EGFRIFNONA 83 07/04/2021         ==========================================      ICD-10-CM ICD-9-CM   1. Type 2 diabetes mellitus with hyperglycemia, without long-term current use of insulin ([facility])  E11.65 250.00     790.29       Diabetes Mellitus    --------------------------------------------------------------------------------------------------------------------------------------------    Glycemic Management   Lab Results   Component Value Date    HGBA1C 5.00 08/25/2021     Stop meformin     Start lantus 6 qhs, self titration explained , may increase up to 30 , target fasting 70 to 95 without dropping more than 40 points overnight    Humalog , 1 unit per 10 grams plus 1 per 25 " above 100 with 1 h goal less than 140, 2 h goal less than 120      Approve for dexcom       No orders of the defined types were placed in this encounter.               This document has been electronically signed by Vitaly Ga MD on October 7, 2021 17:01 CDT

## 2021-10-11 ENCOUNTER — TELEPHONE (OUTPATIENT)
Dept: ENDOCRINOLOGY | Facility: CLINIC | Age: 36
End: 2021-10-11

## 2021-10-11 NOTE — TELEPHONE ENCOUNTER
Please resend documentation for Dexcom.  Pt states info for transmitter was not received.  Thank you!

## 2021-10-12 ENCOUNTER — TELEPHONE (OUTPATIENT)
Dept: ENDOCRINOLOGY | Facility: CLINIC | Age: 36
End: 2021-10-12

## 2021-10-12 DIAGNOSIS — E11.65 TYPE 2 DIABETES MELLITUS WITH HYPERGLYCEMIA, WITHOUT LONG-TERM CURRENT USE OF INSULIN (HCC): Primary | ICD-10-CM

## 2021-10-12 RX ORDER — PROCHLORPERAZINE 25 MG/1
SUPPOSITORY RECTAL
Qty: 9 EACH | Refills: 3 | Status: SHIPPED | OUTPATIENT
Start: 2021-10-12 | End: 2021-10-14 | Stop reason: SDUPTHER

## 2021-10-12 RX ORDER — PROCHLORPERAZINE 25 MG/1
1 SUPPOSITORY RECTAL
Qty: 1 EACH | Refills: 3 | Status: SHIPPED | OUTPATIENT
Start: 2021-10-12

## 2021-10-12 RX ORDER — PROCHLORPERAZINE 25 MG/1
1 SUPPOSITORY RECTAL CONTINUOUS
Qty: 1 EACH | Refills: 0 | Status: SHIPPED | OUTPATIENT
Start: 2021-10-12 | End: 2022-02-25

## 2021-10-14 DIAGNOSIS — E11.65 TYPE 2 DIABETES MELLITUS WITH HYPERGLYCEMIA, WITHOUT LONG-TERM CURRENT USE OF INSULIN (HCC): ICD-10-CM

## 2021-10-14 RX ORDER — PROCHLORPERAZINE 25 MG/1
SUPPOSITORY RECTAL
Qty: 9 EACH | Refills: 3 | Status: SHIPPED | OUTPATIENT
Start: 2021-10-14

## 2021-10-15 ENCOUNTER — LAB (OUTPATIENT)
Dept: LAB | Facility: HOSPITAL | Age: 36
End: 2021-10-15

## 2021-10-15 ENCOUNTER — ROUTINE PRENATAL (OUTPATIENT)
Dept: OBSTETRICS AND GYNECOLOGY | Facility: CLINIC | Age: 36
End: 2021-10-15

## 2021-10-15 ENCOUNTER — CLINICAL SUPPORT (OUTPATIENT)
Dept: CARDIOLOGY | Facility: CLINIC | Age: 36
End: 2021-10-15

## 2021-10-15 VITALS — WEIGHT: 266.2 LBS | DIASTOLIC BLOOD PRESSURE: 62 MMHG | BODY MASS INDEX: 45.69 KG/M2 | SYSTOLIC BLOOD PRESSURE: 108 MMHG

## 2021-10-15 DIAGNOSIS — Z67.91 RH NEGATIVE STATUS DURING PREGNANCY IN FIRST TRIMESTER: ICD-10-CM

## 2021-10-15 DIAGNOSIS — K59.00 CONSTIPATION, UNSPECIFIED CONSTIPATION TYPE: Primary | ICD-10-CM

## 2021-10-15 DIAGNOSIS — O10.912 MATERNAL CHRONIC HYPERTENSION IN SECOND TRIMESTER: ICD-10-CM

## 2021-10-15 DIAGNOSIS — O09.522 HIGH RISK PREGNANCY, MULTIGRAVIDA OF ADVANCED MATERNAL AGE IN SECOND TRIMESTER: ICD-10-CM

## 2021-10-15 DIAGNOSIS — Z98.891 HISTORY OF CESAREAN SECTION: ICD-10-CM

## 2021-10-15 DIAGNOSIS — O24.112 PREGNANCY WITH TYPE 2 DIABETES MELLITUS IN SECOND TRIMESTER: ICD-10-CM

## 2021-10-15 DIAGNOSIS — O26.891 RH NEGATIVE STATUS DURING PREGNANCY IN FIRST TRIMESTER: ICD-10-CM

## 2021-10-15 DIAGNOSIS — Z3A.24 24 WEEKS GESTATION OF PREGNANCY: ICD-10-CM

## 2021-10-15 DIAGNOSIS — Z92.89 HISTORY OF TRANSFUSION OF PACKED RBC: ICD-10-CM

## 2021-10-15 DIAGNOSIS — R06.02 SHORTNESS OF BREATH DURING PREGNANCY: ICD-10-CM

## 2021-10-15 DIAGNOSIS — O21.9 NAUSEA AND VOMITING DURING PREGNANCY PRIOR TO 22 WEEKS GESTATION: ICD-10-CM

## 2021-10-15 DIAGNOSIS — O99.891 SHORTNESS OF BREATH DURING PREGNANCY: ICD-10-CM

## 2021-10-15 LAB
BLD GP AB SCN SERPL QL: NEGATIVE
DEPRECATED RDW RBC AUTO: 43.9 FL (ref 37–54)
ERYTHROCYTE [DISTWIDTH] IN BLOOD BY AUTOMATED COUNT: 14.1 % (ref 12.3–15.4)
FERRITIN SERPL-MCNC: 20.44 NG/ML (ref 13–150)
HCT VFR BLD AUTO: 31.3 % (ref 34–46.6)
HGB BLD-MCNC: 10.1 G/DL (ref 12–15.9)
IRON 24H UR-MRATE: 75 MCG/DL (ref 37–145)
IRON SATN MFR SERPL: 13 % (ref 20–50)
MCH RBC QN AUTO: 27.6 PG (ref 26.6–33)
MCHC RBC AUTO-ENTMCNC: 32.3 G/DL (ref 31.5–35.7)
MCV RBC AUTO: 85.5 FL (ref 79–97)
PLATELET # BLD AUTO: 462 10*3/MM3 (ref 140–450)
PMV BLD AUTO: 9.1 FL (ref 6–12)
QT INTERVAL: 382 MS
QTC INTERVAL: 438 MS
RBC # BLD AUTO: 3.66 10*6/MM3 (ref 3.77–5.28)
TIBC SERPL-MCNC: 592 MCG/DL (ref 298–536)
TRANSFERRIN SERPL-MCNC: 397 MG/DL (ref 200–360)
WBC # BLD AUTO: 15.24 10*3/MM3 (ref 3.4–10.8)

## 2021-10-15 PROCEDURE — 84466 ASSAY OF TRANSFERRIN: CPT

## 2021-10-15 PROCEDURE — 85027 COMPLETE CBC AUTOMATED: CPT

## 2021-10-15 PROCEDURE — 86850 RBC ANTIBODY SCREEN: CPT

## 2021-10-15 PROCEDURE — 82728 ASSAY OF FERRITIN: CPT | Performed by: OBSTETRICS & GYNECOLOGY

## 2021-10-15 PROCEDURE — 83540 ASSAY OF IRON: CPT

## 2021-10-15 PROCEDURE — 99214 OFFICE O/P EST MOD 30 MIN: CPT | Performed by: OBSTETRICS & GYNECOLOGY

## 2021-10-15 PROCEDURE — 36415 COLL VENOUS BLD VENIPUNCTURE: CPT | Performed by: OBSTETRICS & GYNECOLOGY

## 2021-10-15 PROCEDURE — 93000 ELECTROCARDIOGRAM COMPLETE: CPT | Performed by: INTERNAL MEDICINE

## 2021-10-15 RX ORDER — POLYETHYLENE GLYCOL 3350 17 G/17G
17 POWDER, FOR SOLUTION ORAL DAILY
Qty: 20 EACH | Refills: 12 | Status: SHIPPED | OUTPATIENT
Start: 2021-10-15

## 2021-10-15 RX ORDER — DOCUSATE SODIUM 100 MG/1
100 CAPSULE, LIQUID FILLED ORAL 2 TIMES DAILY
Qty: 60 CAPSULE | Refills: 6 | Status: SHIPPED | OUTPATIENT
Start: 2021-10-15

## 2021-10-16 ENCOUNTER — TELEPHONE (OUTPATIENT)
Dept: OBSTETRICS AND GYNECOLOGY | Facility: CLINIC | Age: 36
End: 2021-10-16

## 2021-10-16 PROBLEM — Z92.89 HISTORY OF TRANSFUSION OF PACKED RBC: Status: ACTIVE | Noted: 2021-10-16

## 2021-10-16 PROBLEM — Z98.891 HISTORY OF CESAREAN SECTION: Status: ACTIVE | Noted: 2021-10-16

## 2021-10-16 RX ORDER — FERROUS SULFATE 325(65) MG
325 TABLET ORAL
Qty: 60 TABLET | Refills: 6 | Status: SHIPPED | OUTPATIENT
Start: 2021-10-16

## 2021-10-16 NOTE — TELEPHONE ENCOUNTER
Reviewed blood work with patient mild anemia probably related to pregnancy but given history will start on iron twice daily

## 2021-10-16 NOTE — PROGRESS NOTES
CC: Prenatal visit    Donna Linder is a 36 y.o.  at 24w3d.  Doing well.  Denies contractions, LOF, or VB.  Reports good FM.    /62   Wt 121 kg (266 lb 3.2 oz)   LMP 2021 (Exact Date)   BMI 45.69 kg/m²     Fundal Height (cm): 24 cm  Fetal Heart Rate: 160     Problems (from 21 to present)     Problem Noted Resolved    History of  section 10/16/2021 by Bernardo Douglas MD No    Priority:  High      Overview Signed 10/16/2021 12:09 AM by Bernardo Douglas MD     Wants to have repeat  section after [place] counseling         History of transfusion of packed RBC 10/16/2021 by Bernarod Douglas MD No    Priority:  High      Overview Signed 10/16/2021 12:13 AM by Bernardo Douglas MD     With  section.  History mild anemia.  Start iron         Shortness of breath during pregnancy 2021 by Bernardo Douglas MD No    Priority:  High      Overview Signed 2021 10:52 AM by Bernardo Douglas MD     Patient is short of breath during second trimester pregnancy but says it dates back to when she had Covid in January.  Is on albuterol inhaler         Nausea and vomiting during pregnancy prior to 22 weeks gestation 2021 by Argenis Winston APRN No    Maternal chronic hypertension in second trimester 2021 by Kaitlin Whyte APRN No    High risk pregnancy, multigravida of advanced maternal age in second trimester 2021 by Kaitlin Whyte APRN No    Rh negative status during pregnancy in first trimester 2021 by Kaitlin Whyte APRN No    Pregnancy with type 2 diabetes mellitus in second trimester 2021 by Kaitlin Whyte APRN No    Overview Addendum 2021 12:00 PM by Argenis Winston APRN     Dx at 5w6d on   EarlyGDM vs. Type 2 diabetes?  - A1c- 5.8    : Elevated fasting glucose. Metformin increased to 1000mg at bedtime and 500mg in the morning per Dr. Cummins    : Fasting sugars in the low 100s, improved from 120s. However,   Fasting never <95. 1 hr PP, 140 or less. Will increased to 1500mg at bedtime and 500mg in the morning.             Previous Version    Pain of round ligament affecting pregnancy, antepartum 2021 by Argenis Winston APRN 2021 by Argenis Winston APRN    Overview Signed 2021 11:57 AM by Argenis Winston APRN     Cramping as of 13 weeks; pelvic belt given. Discussed physical therapy referral, pt declines at this time.                A/P: Donna Linder is a 36 y.o.  at 24w3d.  - RTC in 2 weeks  - Reviewed COVID-19 visitation policy  - Reviewed COVID-19 precautions     Diagnosis Plan   1. Constipation, unspecified constipation type   advised Colace and MiraLAX as needed along with a high-fiber diet   2. Shortness of breath during pregnancy     3. Nausea and vomiting during pregnancy prior to 22 weeks gestation     4. Maternal chronic hypertension in second trimester     5. High risk pregnancy, multigravida of advanced maternal age in second trimester  CBC (No Diff)    Ferritin    Iron Profile   6. Rh negative status during pregnancy in first trimester  Antibody Screen   7. Pregnancy with type 2 diabetes mellitus in second trimester  ECG 12 Lead   8. History of  section   [place] counseling done wants repeat  section    Wants to have repeat  section after [place] counseling   9. 24 weeks gestation of pregnancy     10. History of transfusion of packed RBC      With  section.  History mild anemia.  Start iron     Bernardo Douglas MD  10/16/2021  00:19 CDT

## 2021-10-25 DIAGNOSIS — E66.01 MORBID OBESITY WITH BMI OF 45.0-49.9, ADULT (HCC): ICD-10-CM

## 2021-10-25 DIAGNOSIS — O10.911 MATERNAL CHRONIC HYPERTENSION IN FIRST TRIMESTER: ICD-10-CM

## 2021-10-25 DIAGNOSIS — O99.891 SHORTNESS OF BREATH DURING PREGNANCY: ICD-10-CM

## 2021-10-25 DIAGNOSIS — R06.02 SHORTNESS OF BREATH DURING PREGNANCY: ICD-10-CM

## 2021-10-25 DIAGNOSIS — O09.522 MULTIGRAVIDA OF ADVANCED MATERNAL AGE IN SECOND TRIMESTER: ICD-10-CM

## 2021-10-25 DIAGNOSIS — O09.522 HIGH RISK PREGNANCY, MULTIGRAVIDA OF ADVANCED MATERNAL AGE IN SECOND TRIMESTER: ICD-10-CM

## 2021-10-25 DIAGNOSIS — O24.112 PREGNANCY WITH TYPE 2 DIABETES MELLITUS IN SECOND TRIMESTER: ICD-10-CM

## 2021-10-27 RX ORDER — INSULIN GLARGINE 100 [IU]/ML
INJECTION, SOLUTION SUBCUTANEOUS
Qty: 3 ML | Refills: 11 | Status: ON HOLD | OUTPATIENT
Start: 2021-10-27 | End: 2022-01-24 | Stop reason: SDUPTHER

## 2021-10-28 ENCOUNTER — OFFICE VISIT (OUTPATIENT)
Dept: ENDOCRINOLOGY | Facility: CLINIC | Age: 36
End: 2021-10-28

## 2021-10-28 ENCOUNTER — TELEPHONE (OUTPATIENT)
Dept: ENDOCRINOLOGY | Facility: CLINIC | Age: 36
End: 2021-10-28

## 2021-10-28 ENCOUNTER — ROUTINE PRENATAL (OUTPATIENT)
Dept: OBSTETRICS AND GYNECOLOGY | Facility: CLINIC | Age: 36
End: 2021-10-28

## 2021-10-28 VITALS — BODY MASS INDEX: 45.38 KG/M2 | SYSTOLIC BLOOD PRESSURE: 124 MMHG | WEIGHT: 264.4 LBS | DIASTOLIC BLOOD PRESSURE: 70 MMHG

## 2021-10-28 DIAGNOSIS — O26.891 RH NEGATIVE STATUS DURING PREGNANCY IN FIRST TRIMESTER: ICD-10-CM

## 2021-10-28 DIAGNOSIS — R06.02 SHORTNESS OF BREATH DURING PREGNANCY: ICD-10-CM

## 2021-10-28 DIAGNOSIS — O21.9 NAUSEA AND VOMITING DURING PREGNANCY PRIOR TO 22 WEEKS GESTATION: ICD-10-CM

## 2021-10-28 DIAGNOSIS — O09.522 HIGH RISK PREGNANCY, MULTIGRAVIDA OF ADVANCED MATERNAL AGE IN SECOND TRIMESTER: ICD-10-CM

## 2021-10-28 DIAGNOSIS — O24.112 PREGNANCY WITH TYPE 2 DIABETES MELLITUS IN SECOND TRIMESTER: Primary | ICD-10-CM

## 2021-10-28 DIAGNOSIS — Z98.891 HISTORY OF CESAREAN SECTION: ICD-10-CM

## 2021-10-28 DIAGNOSIS — O99.891 SHORTNESS OF BREATH DURING PREGNANCY: ICD-10-CM

## 2021-10-28 DIAGNOSIS — E11.65 TYPE 2 DIABETES MELLITUS WITH HYPERGLYCEMIA, WITHOUT LONG-TERM CURRENT USE OF INSULIN (HCC): ICD-10-CM

## 2021-10-28 DIAGNOSIS — O24.112 PREGNANCY WITH TYPE 2 DIABETES MELLITUS IN SECOND TRIMESTER: ICD-10-CM

## 2021-10-28 DIAGNOSIS — Z67.91 RH NEGATIVE STATUS DURING PREGNANCY IN FIRST TRIMESTER: ICD-10-CM

## 2021-10-28 DIAGNOSIS — Z92.89 HISTORY OF TRANSFUSION OF PACKED RBC: ICD-10-CM

## 2021-10-28 DIAGNOSIS — O10.912 MATERNAL CHRONIC HYPERTENSION IN SECOND TRIMESTER: ICD-10-CM

## 2021-10-28 DIAGNOSIS — Z3A.26 26 WEEKS GESTATION OF PREGNANCY: Primary | ICD-10-CM

## 2021-10-28 PROCEDURE — 99214 OFFICE O/P EST MOD 30 MIN: CPT | Performed by: OBSTETRICS & GYNECOLOGY

## 2021-10-28 RX ORDER — DIPHENHYDRAMINE HYDROCHLORIDE 25 MG/1
25 CAPSULE ORAL 2 TIMES DAILY
Qty: 60 TABLET | Refills: 6 | Status: SHIPPED | OUTPATIENT
Start: 2021-10-28

## 2021-10-28 RX ORDER — LABETALOL 100 MG/1
100 TABLET, FILM COATED ORAL 2 TIMES DAILY
Qty: 60 TABLET | Refills: 6 | Status: SHIPPED | OUTPATIENT
Start: 2021-10-28 | End: 2021-12-16 | Stop reason: SDUPTHER

## 2021-10-28 NOTE — PROGRESS NOTES
Donna Linder is a 36 y.o. female seen by diabetes educator for review of medication and BG during pregnancy. Patient is 26 weeks and 1 day today.  Patient saw Dr. Hermes Ga 3 weeks to start insulin.     1. Patient stated she was not aware of using Lantus insulin until she went to OBGYN follow up today. Pt stated she does give Humalog with meals ONLY if her BG is high before she eats. So she doesn't give every meal. Pt verbalized BG has gotten up to 168 after eating. Waking has been 105-121mg/dL.     2. Blood Sugar Targets    A. Fasting and Pre-Meal: 70-95 95 mg/dl    B. 1-hour post-prandial: 140 mg/dl or less    C. 2-hour post-prandial: 120 mg/dl or less    D. Patient using Dexcom G6 but didn't bring with her to download. She stated she has sensor errors - instructed pt to call Dexcom for replacements if the sensor gives error or fails. Number provided. Pt verbalized understanding.     3. Carbohydrate Counting    A. Reviewed carbohydrate-containing foods, standard serving sizes, and reading nutrition labels.    B. Recommended patient eat 30-45 grams of carbohydrate at breakfast, 45-75 grams of carbohydrate at lunch and at supper. Limit snacks aiming for 15 grams/snack.    C. Reviewed non-carbohydrate foods that can be used as fillers or extra snacks.    D. Patient reported that she has been drinking Coca Cola. Instructed pt to changed to Coke Zero since Coke Zero has 0 carbs!     4. Lantus - start taking 10 units daily, same time every day. Pt agreed nightly is best. Explained this is long acting insulin. Give it every night no matter what BG is. Reviewed titration and explained if bedtime and waking are within 40 mg/dL, dose is appropriate. Provided examples on take home sheet of how to adjust by 2 units at a time. Pt verbalized understanding.     5. Humalog - fast acting insulin - works for 1. High BG (sliding scale) & 2. Carbs (carb scale)- you can eat or drink carbs. Give before eating since it  takes 10-15 mins to get into body and start working. Look at both Sliding Scale of 1un:25mg/dL above 100 mg/dL and Carb Scale of 1un:10g to determine how much Humalog insulin to give before meals.   Sliding Scale   + plus Carb Scale  100-125 = 1 unit  10g = 1 un   126-150 = 2 un  20g = 2 un  151-175 = 3 un  30g = 3 un  176-200 = 4 un  40g = 4 un  201-225 = 5 un   50g = 5 un  226-250 = 6 un  60g = 6 un    All of the above information was written down for patient on a take home sheet. Pt encouraged to take a picture on her phone so she will always have the scales. Pt verbalized understanding. Keep follow up appt on 1/10/2022 but added to cancellation list due pregnant and possible early delivery due to high risk.    Destiny Oneal, BSN, RN, [facility]  Diabetes Educator

## 2021-10-29 NOTE — PROGRESS NOTES
CC: Prenatal visit    Donna Linder is a 36 y.o.  at 26w2d.  Doing well.  Denies contractions, LOF, or VB.  Reports good FM.    /70   Wt 120 kg (264 lb 6.4 oz)   LMP 2021 (Exact Date)   BMI 45.38 kg/m²     Fundal Height (cm): 26 cm  Fetal Heart Rate: 150     Problems (from 21 to present)     Problem Noted Resolved    History of  section 10/16/2021 by Bernardo Douglas MD No    Priority:  High      Overview Signed 10/16/2021 12:09 AM by Bernardo Douglas MD     Wants to have repeat  section after Tolac counseling         History of transfusion of packed RBC 10/16/2021 by Bernardo Douglas MD No    Priority:  High      Overview Signed 10/16/2021 12:13 AM by Bernardo Douglas MD     With  section.  History mild anemia.  Start iron         Shortness of breath during pregnancy 2021 by Bernardo Douglas MD No    Priority:  High      Overview Signed 2021 10:52 AM by Bernardo Douglas MD     Patient is short of breath during second trimester pregnancy but says it dates back to when she had Covid in January.  Is on albuterol inhaler         Nausea and vomiting during pregnancy prior to 22 weeks gestation 2021 by Argenis Winston APRN No    Maternal chronic hypertension in second trimester 2021 by Kaitlin Whyte APRN No    High risk pregnancy, multigravida of advanced maternal age in second trimester 2021 by Kaitlin Whyte APRN No    Rh negative status during pregnancy in first trimester 2021 by Kaitlin Whyte APRN No    Pregnancy with type 2 diabetes mellitus in second trimester 2021 by Kaitlin Whyte APRN No    Overview Addendum 2021 12:00 PM by Argenis Winston APRN     Dx at 5w6d on   EarlyGDM vs. Type 2 diabetes?  - A1c- 5.8    : Elevated fasting glucose. Metformin increased to 1000mg at bedtime and 500mg in the morning per Dr. Cummins    : Fasting sugars in the low 100s, improved from 120s. However,   Fasting never <95. 1 hr PP, 140 or less. Will increased to 1500mg at bedtime and 500mg in the morning.             Previous Version    Pain of round ligament affecting pregnancy, antepartum 2021 by Argenis Winston APRN 2021 by Argenis Winston APRN    Overview Signed 2021 11:57 AM by Argenis Winston APRN     Cramping as of 13 weeks; pelvic belt given. Discussed physical therapy referral, pt declines at this time.                A/P: Donna Linder is a 36 y.o.  at 26w2d.  - RTC in 1 weeks  - Reviewed COVID-19 visitation policy  - Reviewed COVID-19 precautions     Diagnosis Plan   1. 26 weeks gestation of pregnancy     2. History of  section   tolac counseling again: She wants repeat    3. History of transfusion of packed RBC   continue iron   4. Shortness of breath during pregnancy   follow-up pulmonary   5. Nausea and vomiting during pregnancy prior to 22 weeks gestation     6. Maternal chronic hypertension in second trimester   eyes headache visual change epigastric pain DTRs 2+/4   7. High risk pregnancy, multigravida of advanced maternal age in second trimester     8. Rh negative status during pregnancy in first trimester   will get antibody screen   9. Pregnancy with type 2 diabetes mellitus in second trimester  US Ob Follow Up Transabdominal Approach patient's blood sugars are elevated particularly fasting but in talking the patient it turns out she is taking short acting at night instead of long-acting as per Dr. Ga's note she has an appointment with them tomorrow she is going to get her instructions straightened out     Bernardo Douglas MD  10/29/2021  12:00 CDT

## 2021-11-03 ENCOUNTER — TELEPHONE (OUTPATIENT)
Dept: OBSTETRICS AND GYNECOLOGY | Facility: CLINIC | Age: 36
End: 2021-11-03

## 2021-11-03 ENCOUNTER — TELEPHONE (OUTPATIENT)
Dept: OBSTETRICS AND GYNECOLOGY | Facility: HOSPITAL | Age: 36
End: 2021-11-03

## 2021-11-03 NOTE — TELEPHONE ENCOUNTER
Pt called requesting to send a message to Dr Douglas for him to call her.    She would not state what it was regarding.

## 2021-11-03 NOTE — TELEPHONE ENCOUNTER
Contacted patient as Maternity Navigator.  Explained [facility] program and she would like to participate.  Scheduled appointment for 11-10-21 at 0900 to complete intake form.

## 2021-11-04 ENCOUNTER — LAB (OUTPATIENT)
Dept: LAB | Facility: HOSPITAL | Age: 36
End: 2021-11-04

## 2021-11-04 ENCOUNTER — ROUTINE PRENATAL (OUTPATIENT)
Dept: OBSTETRICS AND GYNECOLOGY | Facility: CLINIC | Age: 36
End: 2021-11-04

## 2021-11-04 VITALS — BODY MASS INDEX: 44.97 KG/M2 | SYSTOLIC BLOOD PRESSURE: 128 MMHG | WEIGHT: 262 LBS | DIASTOLIC BLOOD PRESSURE: 78 MMHG

## 2021-11-04 DIAGNOSIS — Z98.891 HISTORY OF CESAREAN SECTION: ICD-10-CM

## 2021-11-04 DIAGNOSIS — O99.891 SHORTNESS OF BREATH DURING PREGNANCY: ICD-10-CM

## 2021-11-04 DIAGNOSIS — Z67.91 RH NEGATIVE STATUS DURING PREGNANCY IN FIRST TRIMESTER: ICD-10-CM

## 2021-11-04 DIAGNOSIS — Z92.89 HISTORY OF TRANSFUSION OF PACKED RBC: ICD-10-CM

## 2021-11-04 DIAGNOSIS — O21.9 NAUSEA AND VOMITING DURING PREGNANCY PRIOR TO 22 WEEKS GESTATION: ICD-10-CM

## 2021-11-04 DIAGNOSIS — R06.02 SHORTNESS OF BREATH DURING PREGNANCY: ICD-10-CM

## 2021-11-04 DIAGNOSIS — O24.112 PREGNANCY WITH TYPE 2 DIABETES MELLITUS IN SECOND TRIMESTER: ICD-10-CM

## 2021-11-04 DIAGNOSIS — Z3A.27 27 WEEKS GESTATION OF PREGNANCY: ICD-10-CM

## 2021-11-04 DIAGNOSIS — O09.522 HIGH RISK PREGNANCY, MULTIGRAVIDA OF ADVANCED MATERNAL AGE IN SECOND TRIMESTER: ICD-10-CM

## 2021-11-04 DIAGNOSIS — O10.912 MATERNAL CHRONIC HYPERTENSION IN SECOND TRIMESTER: ICD-10-CM

## 2021-11-04 DIAGNOSIS — E11.69 TYPE 2 DIABETES MELLITUS WITH OTHER SPECIFIED COMPLICATION, UNSPECIFIED WHETHER LONG TERM INSULIN USE (HCC): Primary | ICD-10-CM

## 2021-11-04 DIAGNOSIS — O26.891 RH NEGATIVE STATUS DURING PREGNANCY IN FIRST TRIMESTER: ICD-10-CM

## 2021-11-04 LAB
BASOPHILS # BLD AUTO: 0.03 10*3/MM3 (ref 0–0.2)
BASOPHILS NFR BLD AUTO: 0.2 % (ref 0–1.5)
DEPRECATED RDW RBC AUTO: 44.2 FL (ref 37–54)
EOSINOPHIL # BLD AUTO: 0.17 10*3/MM3 (ref 0–0.4)
EOSINOPHIL NFR BLD AUTO: 1.4 % (ref 0.3–6.2)
ERYTHROCYTE [DISTWIDTH] IN BLOOD BY AUTOMATED COUNT: 14.3 % (ref 12.3–15.4)
FERRITIN SERPL-MCNC: 37.28 NG/ML (ref 13–150)
HCT VFR BLD AUTO: 29.6 % (ref 34–46.6)
HGB BLD-MCNC: 9.7 G/DL (ref 12–15.9)
IMM GRANULOCYTES # BLD AUTO: 0.04 10*3/MM3 (ref 0–0.05)
IMM GRANULOCYTES NFR BLD AUTO: 0.3 % (ref 0–0.5)
IRON 24H UR-MRATE: 28 MCG/DL (ref 37–145)
IRON SATN MFR SERPL: 5 % (ref 20–50)
LYMPHOCYTES # BLD AUTO: 1.64 10*3/MM3 (ref 0.7–3.1)
LYMPHOCYTES NFR BLD AUTO: 13.5 % (ref 19.6–45.3)
MCH RBC QN AUTO: 28 PG (ref 26.6–33)
MCHC RBC AUTO-ENTMCNC: 32.8 G/DL (ref 31.5–35.7)
MCV RBC AUTO: 85.5 FL (ref 79–97)
MONOCYTES # BLD AUTO: 0.54 10*3/MM3 (ref 0.1–0.9)
MONOCYTES NFR BLD AUTO: 4.4 % (ref 5–12)
NEUTROPHILS NFR BLD AUTO: 80.2 % (ref 42.7–76)
NEUTROPHILS NFR BLD AUTO: 9.77 10*3/MM3 (ref 1.7–7)
NRBC BLD AUTO-RTO: 0 /100 WBC (ref 0–0.2)
PLATELET # BLD AUTO: 483 10*3/MM3 (ref 140–450)
PMV BLD AUTO: 9.1 FL (ref 6–12)
RBC # BLD AUTO: 3.46 10*6/MM3 (ref 3.77–5.28)
TIBC SERPL-MCNC: 539 MCG/DL (ref 298–536)
TRANSFERRIN SERPL-MCNC: 362 MG/DL (ref 200–360)
WBC # BLD AUTO: 12.19 10*3/MM3 (ref 3.4–10.8)

## 2021-11-04 PROCEDURE — 36415 COLL VENOUS BLD VENIPUNCTURE: CPT | Performed by: OBSTETRICS & GYNECOLOGY

## 2021-11-04 PROCEDURE — 82728 ASSAY OF FERRITIN: CPT | Performed by: OBSTETRICS & GYNECOLOGY

## 2021-11-04 PROCEDURE — 83540 ASSAY OF IRON: CPT

## 2021-11-04 PROCEDURE — 84466 ASSAY OF TRANSFERRIN: CPT

## 2021-11-04 PROCEDURE — 85025 COMPLETE CBC W/AUTO DIFF WBC: CPT

## 2021-11-04 PROCEDURE — 96372 THER/PROPH/DIAG INJ SC/IM: CPT | Performed by: OBSTETRICS & GYNECOLOGY

## 2021-11-04 PROCEDURE — 99214 OFFICE O/P EST MOD 30 MIN: CPT | Performed by: OBSTETRICS & GYNECOLOGY

## 2021-11-04 RX ORDER — INSULIN GLARGINE 100 [IU]/ML
INJECTION, SOLUTION SUBCUTANEOUS
COMMUNITY
End: 2022-01-24 | Stop reason: HOSPADM

## 2021-11-04 RX ORDER — AZITHROMYCIN 250 MG/1
TABLET, FILM COATED ORAL
COMMUNITY
Start: 2021-11-03 | End: 2021-11-09

## 2021-11-04 NOTE — PROGRESS NOTES
CC: Prenatal visit    Donna Linder is a 36 y.o.  at 27w1d.  Doing well.  Denies contractions, LOF, or VB.  Reports good FM.    /78   Wt 119 kg (262 lb)   LMP 2021 (Exact Date)   BMI 44.97 kg/m²              Problems (from 21 to present)     Problem Noted Resolved    History of  section 10/16/2021 by Bernardo Douglas MD No    Priority:  High      Overview Signed 10/16/2021 12:09 AM by Bernardo Douglas MD     Wants to have repeat  section after Tolac counseling         History of transfusion of packed RBC 10/16/2021 by Bernardo Douglas MD No    Priority:  High      Overview Signed 10/16/2021 12:13 AM by Bernardo Douglas MD     With  section.  History mild anemia.  Start iron         Shortness of breath during pregnancy 2021 by Bernardo Douglas MD No    Priority:  High      Overview Signed 2021 10:52 AM by Bernardo Douglas MD     Patient is short of breath during second trimester pregnancy but says it dates back to when she had Covid in January.  Is on albuterol inhaler         Nausea and vomiting during pregnancy prior to 22 weeks gestation 2021 by Argenis Winston APRN No    Maternal chronic hypertension in second trimester 2021 by Kaitlin Whyte APRN No    High risk pregnancy, multigravida of advanced maternal age in second trimester 2021 by Kaitlin Whyte APRN No    Rh negative status during pregnancy in first trimester 2021 by Kaitlin Whyte APRN No    Pregnancy with type 2 diabetes mellitus in second trimester 2021 by Kaitlin Whyte APRN No    Overview Addendum 2021 12:00 PM by Argenis Winston APRN     Dx at 5w6d on   EarlyGDM vs. Type 2 diabetes?  - A1c- 5.8    : Elevated fasting glucose. Metformin increased to 1000mg at bedtime and 500mg in the morning per Dr. Cummins    : Fasting sugars in the low 100s, improved from 120s. However,  Fasting never <95. 1 hr PP, 140 or less. Will  increased to 1500mg at bedtime and 500mg in the morning.             Previous Version    Pain of round ligament affecting pregnancy, antepartum 2021 by Argenis Winston APRN 2021 by Argenis Winston APRN    Overview Signed 2021 11:57 AM by Argenis Winston APRN     Cramping as of 13 weeks; pelvic belt given. Discussed physical therapy referral, pt declines at this time.                A/P: Donna Linder is a 36 y.o.  at 27w1d.  - RTC in 1 weeks  - Reviewed COVID-19 visitation policy  - Reviewed COVID-19 precautions     Diagnosis Plan   1. Type 2 diabetes mellitus with other specified complication, unspecified whether long term insulin use (HCC)  US ob follow up transabdominal approach  Patient says her blood sugars are doing much better now that she got her regimen straightened out.  She had been using short acting insulin as her nighttime insulin instead of long-acting insulin.  She has gotten this in straightened out and her fasting blood sugars are now in the mid 90s and her postprandials are much better at all meals.  Were going to get a follow-up ultrasound for growth in about 1 to 2 weeks per [facility] recommendation   2. History of  section     3. History of transfusion of packed RBC  CBC & Differential    Ferritin    Iron Profile   4. Shortness of breath during pregnancy     5. Nausea and vomiting during pregnancy prior to 22 weeks gestation     6. Maternal chronic hypertension in second trimester   denies headaches visual change epigastric pain DTRs 2+/4 blood pressure good today   7. High risk pregnancy, multigravida of advanced maternal age in second trimester     8. Rh negative status during pregnancy in first trimester   antibody screen negative RhoGam given today   9. Pregnancy with type 2 diabetes mellitus in second trimester     10. 27 weeks gestation of pregnancy       Bernardo Douglas MD  2021  13:41 CDT

## 2021-11-09 ENCOUNTER — TELEPHONE (OUTPATIENT)
Dept: OBSTETRICS AND GYNECOLOGY | Facility: CLINIC | Age: 36
End: 2021-11-09

## 2021-11-09 NOTE — TELEPHONE ENCOUNTER
Patient called and is needing a call back in regards to her lab work. Her number is [phone].        Thank you,      Yahaira

## 2021-11-10 ENCOUNTER — TELEPHONE (OUTPATIENT)
Dept: OBSTETRICS AND GYNECOLOGY | Facility: HOSPITAL | Age: 36
End: 2021-11-10

## 2021-11-10 NOTE — TELEPHONE ENCOUNTER
Spoke to patient today as Maternity Navigator. [facility] intake forms completed.  Please see scanned forms.  She provided me an email address and I will send her instructions on setting up [facility] for additional communications.  Will speak to patient again on 12/8/21 at 0900.

## 2021-11-18 ENCOUNTER — LAB (OUTPATIENT)
Dept: LAB | Facility: HOSPITAL | Age: 36
End: 2021-11-18

## 2021-11-18 ENCOUNTER — ROUTINE PRENATAL (OUTPATIENT)
Dept: OBSTETRICS AND GYNECOLOGY | Facility: CLINIC | Age: 36
End: 2021-11-18

## 2021-11-18 DIAGNOSIS — R06.02 SHORTNESS OF BREATH DURING PREGNANCY: ICD-10-CM

## 2021-11-18 DIAGNOSIS — O26.891 RH NEGATIVE STATUS DURING PREGNANCY IN FIRST TRIMESTER: ICD-10-CM

## 2021-11-18 DIAGNOSIS — Z92.89 HISTORY OF TRANSFUSION OF PACKED RBC: ICD-10-CM

## 2021-11-18 DIAGNOSIS — O09.522 HIGH RISK PREGNANCY, MULTIGRAVIDA OF ADVANCED MATERNAL AGE IN SECOND TRIMESTER: ICD-10-CM

## 2021-11-18 DIAGNOSIS — O99.891 SHORTNESS OF BREATH DURING PREGNANCY: ICD-10-CM

## 2021-11-18 DIAGNOSIS — O10.912 MATERNAL CHRONIC HYPERTENSION IN SECOND TRIMESTER: ICD-10-CM

## 2021-11-18 DIAGNOSIS — O24.112 PREGNANCY WITH TYPE 2 DIABETES MELLITUS IN SECOND TRIMESTER: ICD-10-CM

## 2021-11-18 DIAGNOSIS — Z3A.29 29 WEEKS GESTATION OF PREGNANCY: Primary | ICD-10-CM

## 2021-11-18 DIAGNOSIS — Z67.91 RH NEGATIVE STATUS DURING PREGNANCY IN FIRST TRIMESTER: ICD-10-CM

## 2021-11-18 DIAGNOSIS — Z98.891 HISTORY OF CESAREAN SECTION: ICD-10-CM

## 2021-11-18 DIAGNOSIS — O21.9 NAUSEA AND VOMITING DURING PREGNANCY PRIOR TO 22 WEEKS GESTATION: ICD-10-CM

## 2021-11-18 LAB
DEPRECATED RDW RBC AUTO: 44.6 FL (ref 37–54)
ERYTHROCYTE [DISTWIDTH] IN BLOOD BY AUTOMATED COUNT: 14.6 % (ref 12.3–15.4)
HCT VFR BLD AUTO: 29.2 % (ref 34–46.6)
HGB BLD-MCNC: 9.4 G/DL (ref 12–15.9)
MCH RBC QN AUTO: 27.2 PG (ref 26.6–33)
MCHC RBC AUTO-ENTMCNC: 32.2 G/DL (ref 31.5–35.7)
MCV RBC AUTO: 84.6 FL (ref 79–97)
PLATELET # BLD AUTO: 466 10*3/MM3 (ref 140–450)
PMV BLD AUTO: 9 FL (ref 6–12)
RBC # BLD AUTO: 3.45 10*6/MM3 (ref 3.77–5.28)
WBC NRBC COR # BLD: 13.73 10*3/MM3 (ref 3.4–10.8)

## 2021-11-18 PROCEDURE — 85027 COMPLETE CBC AUTOMATED: CPT

## 2021-11-18 PROCEDURE — 36415 COLL VENOUS BLD VENIPUNCTURE: CPT

## 2021-11-18 PROCEDURE — 99213 OFFICE O/P EST LOW 20 MIN: CPT | Performed by: OBSTETRICS & GYNECOLOGY

## 2021-11-18 RX ORDER — ALBUTEROL SULFATE 2.5 MG/3ML
3 SOLUTION RESPIRATORY (INHALATION) AS NEEDED
COMMUNITY
Start: 2021-11-13

## 2021-11-18 RX ORDER — ONDANSETRON 4 MG/1
4 TABLET, FILM COATED ORAL DAILY PRN
COMMUNITY
Start: 2021-11-08 | End: 2021-12-30

## 2021-11-18 RX ORDER — CEFDINIR 300 MG/1
600 CAPSULE ORAL DAILY
COMMUNITY
Start: 2021-11-13 | End: 2022-01-10

## 2021-11-19 ENCOUNTER — TELEPHONE (OUTPATIENT)
Dept: OBSTETRICS AND GYNECOLOGY | Facility: CLINIC | Age: 36
End: 2021-11-19

## 2021-11-19 VITALS — DIASTOLIC BLOOD PRESSURE: 70 MMHG | BODY MASS INDEX: 44.83 KG/M2 | WEIGHT: 261.2 LBS | SYSTOLIC BLOOD PRESSURE: 126 MMHG

## 2021-11-19 RX ORDER — FLUCONAZOLE 200 MG/1
200 TABLET ORAL DAILY
Qty: 2 TABLET | Refills: 0 | Status: ON HOLD | OUTPATIENT
Start: 2021-11-19 | End: 2021-12-18

## 2021-11-19 NOTE — TELEPHONE ENCOUNTER
Called and reviewed CBC with i-STAT hemoglobin is decreasing says is compliant were to increase iron to twice daily and recheck CBC in a few weeks.  If continues to decrease beyond what is physiologic will send to hematology.    Also complaining of yeast infection after you reviewing risk benefits alternative will give Diflucan

## 2021-11-19 NOTE — PROGRESS NOTES
CC: Prenatal visit    Donna Linder is a 36 y.o.  at 29w2d.  Doing well.  Denies contractions, LOF, or VB.  Reports good FM.    /70   Wt 118 kg (261 lb 3.2 oz)   LMP 2021 (Exact Date)   BMI 44.83 kg/m²     Fundal Height (cm): 30 cm  Fetal Heart Rate: 139     Problems (from 21 to present)     Problem Noted Resolved    History of  section 10/16/2021 by Bernardo Douglas MD No    Priority:  High      Overview Signed 10/16/2021 12:09 AM by Bernardo Douglas MD     Wants to have repeat  section after Tolac counseling         History of transfusion of packed RBC 10/16/2021 by Bernardo Douglas MD No    Priority:  High      Overview Signed 10/16/2021 12:13 AM by Bernardo Douglas MD     With  section.  History mild anemia.  Start iron         Shortness of breath during pregnancy 2021 by Bernardo Douglas MD No    Priority:  High      Overview Signed 2021 10:52 AM by Bernardo Douglas MD     Patient is short of breath during second trimester pregnancy but says it dates back to when she had Covid in January.  Is on albuterol inhaler         Nausea and vomiting during pregnancy prior to 22 weeks gestation 2021 by Argenis Winston APRN No    Maternal chronic hypertension in second trimester 2021 by Kaitlin Whyte APRN No    High risk pregnancy, multigravida of advanced maternal age in second trimester 2021 by Kaitlin Whyte APRN No    Rh negative status during pregnancy in first trimester 2021 by Kaitlin Whyte APRN No    Pregnancy with type 2 diabetes mellitus in second trimester 2021 by Kaitlin Whyte APRN No    Overview Addendum 2021 12:00 PM by Argenis Winston APRN     Dx at 5w6d on   EarlyGDM vs. Type 2 diabetes?  - A1c- 5.8    : Elevated fasting glucose. Metformin increased to 1000mg at bedtime and 500mg in the morning per Dr. Cummins    : Fasting sugars in the low 100s, improved from 120s. However,   Fasting never <95. 1 hr PP, 140 or less. Will increased to 1500mg at bedtime and 500mg in the morning.             Previous Version    Pain of round ligament affecting pregnancy, antepartum 2021 by Argenis Winston APRN 2021 by Argenis Winston APRN    Overview Signed 2021 11:57 AM by Argenis Winston APRN     Cramping as of 13 weeks; pelvic belt given. Discussed physical therapy referral, pt declines at this time.                A/P: Donna Linder is a 36 y.o.  at 29w2d.  - RTC in 4 days  - Reviewed COVID-19 visitation policy  - Reviewed COVID-19 precautions     Diagnosis Plan   1. 29 weeks gestation of pregnancy     2. History of  section   Tolac counseling done certainly wants to have repeat    3. History of transfusion of packed RBC  CBC (No Diff)   4. Shortness of breath during pregnancy     5. Nausea and vomiting during pregnancy prior to 22 weeks gestation     6. Maternal chronic hypertension in second trimester   denies headaches visual changes or epigastric pain   7. High risk pregnancy, multigravida of advanced maternal age in second trimester     8. Rh negative status during pregnancy in first trimester   RhoGam given 2021   9. Pregnancy with type 2 diabetes mellitus in second trimester  US Fetal Biophysical Profile;Without Non-Stress Testing as her blood sugars are doing very well under endocrinology care now that she has gotten her dosing strike dialysis.  Given her comorbidities were going to begin fetal wellbeing studies in the next few days     Bernardo Douglas MD  2021  09:56 CST

## 2021-12-02 ENCOUNTER — ROUTINE PRENATAL (OUTPATIENT)
Dept: OBSTETRICS AND GYNECOLOGY | Facility: CLINIC | Age: 36
End: 2021-12-02

## 2021-12-02 ENCOUNTER — LAB (OUTPATIENT)
Dept: LAB | Facility: HOSPITAL | Age: 36
End: 2021-12-02

## 2021-12-02 VITALS — WEIGHT: 258.4 LBS | BODY MASS INDEX: 44.35 KG/M2 | SYSTOLIC BLOOD PRESSURE: 118 MMHG | DIASTOLIC BLOOD PRESSURE: 82 MMHG

## 2021-12-02 DIAGNOSIS — O21.9 NAUSEA AND VOMITING DURING PREGNANCY PRIOR TO 22 WEEKS GESTATION: ICD-10-CM

## 2021-12-02 DIAGNOSIS — Z92.89 HISTORY OF TRANSFUSION OF PACKED RBC: ICD-10-CM

## 2021-12-02 DIAGNOSIS — O99.891 SHORTNESS OF BREATH DURING PREGNANCY: ICD-10-CM

## 2021-12-02 DIAGNOSIS — Z3A.31 31 WEEKS GESTATION OF PREGNANCY: ICD-10-CM

## 2021-12-02 DIAGNOSIS — Z98.891 HISTORY OF C-SECTION: ICD-10-CM

## 2021-12-02 DIAGNOSIS — O24.112 PREGNANCY WITH TYPE 2 DIABETES MELLITUS IN SECOND TRIMESTER: ICD-10-CM

## 2021-12-02 DIAGNOSIS — O10.912 MATERNAL CHRONIC HYPERTENSION IN SECOND TRIMESTER: ICD-10-CM

## 2021-12-02 DIAGNOSIS — I10 HYPERTENSION, UNSPECIFIED TYPE: ICD-10-CM

## 2021-12-02 DIAGNOSIS — Z67.91 RH NEGATIVE STATUS DURING PREGNANCY IN FIRST TRIMESTER: ICD-10-CM

## 2021-12-02 DIAGNOSIS — O26.891 RH NEGATIVE STATUS DURING PREGNANCY IN FIRST TRIMESTER: ICD-10-CM

## 2021-12-02 DIAGNOSIS — O09.522 HIGH RISK PREGNANCY, MULTIGRAVIDA OF ADVANCED MATERNAL AGE IN SECOND TRIMESTER: ICD-10-CM

## 2021-12-02 DIAGNOSIS — Z34.80 SUPERVISION OF OTHER NORMAL PREGNANCY: ICD-10-CM

## 2021-12-02 DIAGNOSIS — O24.112 PREGNANCY WITH TYPE 2 DIABETES MELLITUS IN SECOND TRIMESTER: Primary | ICD-10-CM

## 2021-12-02 DIAGNOSIS — R06.02 SHORTNESS OF BREATH DURING PREGNANCY: ICD-10-CM

## 2021-12-02 DIAGNOSIS — Z98.891 HISTORY OF CESAREAN SECTION: ICD-10-CM

## 2021-12-02 LAB
DEPRECATED RDW RBC AUTO: 47.7 FL (ref 37–54)
ERYTHROCYTE [DISTWIDTH] IN BLOOD BY AUTOMATED COUNT: 15 % (ref 12.3–15.4)
HCT VFR BLD AUTO: 31.8 % (ref 34–46.6)
HGB BLD-MCNC: 10.1 G/DL (ref 12–15.9)
MCH RBC QN AUTO: 27.5 PG (ref 26.6–33)
MCHC RBC AUTO-ENTMCNC: 31.8 G/DL (ref 31.5–35.7)
MCV RBC AUTO: 86.6 FL (ref 79–97)
PLATELET # BLD AUTO: 457 10*3/MM3 (ref 140–450)
PMV BLD AUTO: 9 FL (ref 6–12)
PROT 24H UR-MRATE: 70 MG/24HOURS (ref 0–150)
RBC # BLD AUTO: 3.67 10*6/MM3 (ref 3.77–5.28)
WBC NRBC COR # BLD: 12.39 10*3/MM3 (ref 3.4–10.8)

## 2021-12-02 PROCEDURE — 84156 ASSAY OF PROTEIN URINE: CPT

## 2021-12-02 PROCEDURE — 85027 COMPLETE CBC AUTOMATED: CPT

## 2021-12-02 PROCEDURE — 99213 OFFICE O/P EST LOW 20 MIN: CPT | Performed by: OBSTETRICS & GYNECOLOGY

## 2021-12-02 PROCEDURE — 81050 URINALYSIS VOLUME MEASURE: CPT

## 2021-12-02 PROCEDURE — 36415 COLL VENOUS BLD VENIPUNCTURE: CPT

## 2021-12-02 NOTE — PROGRESS NOTES
CC: Prenatal visit    Donna Linder is a 36 y.o.  at 31w1d.  Doing well.  Denies contractions, LOF, or VB.  Reports good FM.    /82   Wt 117 kg (258 lb 6.4 oz)   LMP 2021 (Exact Date)   BMI 44.35 kg/m²   Fundal Height (cm): 32 cm  Fetal Heart Rate: 150     Problems (from 21 to present)     Problem Noted Resolved    History of  section 10/16/2021 by Bernardo Douglas MD No    Priority:  High      Overview Signed 10/16/2021 12:09 AM by Bernardo Douglas MD     Wants to have repeat  section after Tolac counseling         History of transfusion of packed RBC 10/16/2021 by Bernardo Douglas MD No    Priority:  High      Overview Signed 10/16/2021 12:13 AM by Bernardo Douglas MD     With  section.  History mild anemia.  Start iron         Shortness of breath during pregnancy 2021 by Bernardo Douglas MD No    Priority:  High      Overview Signed 2021 10:52 AM by Bernardo Douglas MD     Patient is short of breath during second trimester pregnancy but says it dates back to when she had Covid in January.  Is on albuterol inhaler         Nausea and vomiting during pregnancy prior to 22 weeks gestation 2021 by Argenis Winston APRN No    Maternal chronic hypertension in second trimester 2021 by Kaitlin Whyte APRN No    High risk pregnancy, multigravida of advanced maternal age in second trimester 2021 by Kaitlin Whyte APRN No    Rh negative status during pregnancy in first trimester 2021 by Kaitlin Whyte APRN No    Pregnancy with type 2 diabetes mellitus in second trimester 2021 by Kaitlin Whyte APRN No    Overview Addendum 2021 12:00 PM by Argenis Winston APRN     Dx at 5w6d on   EarlyGDM vs. Type 2 diabetes?  - A1c- 5.8    : Elevated fasting glucose. Metformin increased to 1000mg at bedtime and 500mg in the morning per Dr. Cummins    : Fasting sugars in the low 100s, improved from 120s. However,   Fasting never <95. 1 hr PP, 140 or less. Will increased to 1500mg at bedtime and 500mg in the morning.             Previous Version    Pain of round ligament affecting pregnancy, antepartum 2021 by Argenis Winston APRN 2021 by Argenis Winston APRN    Overview Signed 2021 11:57 AM by Argenis Winston APRN     Cramping as of 13 weeks; pelvic belt given. Discussed physical therapy referral, pt declines at this time.                A/P: Donna Linder is a 36 y.o.  at 31w1d.  - RTC in 4 days peak fetal wellbeing testing biophysical profile if can arrange  - Reviewed COVID-19 visitation policy  - Reviewed COVID-19 precautions     Diagnosis Plan   1. Pregnancy with type 2 diabetes mellitus in second trimester  US Fetal Biophysical Profile;Without Non-Stress Testing    US Fetal Biophysical Profile;Without Non-Stress Testing    US Fetal Biophysical Profile;Without Non-Stress Testing    US Fetal Biophysical Profile;Without Non-Stress Testing    US Fetal Biophysical Profile;Without Non-Stress Testing    US Fetal Biophysical Profile;Without Non-Stress Testing    US Fetal Biophysical Profile;Without Non-Stress Testing    US Fetal Biophysical Profile;Without Non-Stress Testing    US Fetal Biophysical Profile;Without Non-Stress Testing    US Fetal Biophysical Profile;Without Non-Stress Testing    US Fetal Biophysical Profile;Without Non-Stress Testing    US Fetal Biophysical Profile;Without Non-Stress Testing    US Fetal Biophysical Profile;Without Non-Stress Testing    US Fetal Biophysical Profile;Without Non-Stress Testing   2. History of  section   Tolac counseling done wants repeat  section   3. History of transfusion of packed RBC  CBC (No Diff) says been compliant with iron has been mildly anemic consistent with pregnancy changes check CBC today   4. Shortness of breath during pregnancy     5. Nausea and vomiting during pregnancy prior to 22 weeks gestation     6.  Maternal chronic hypertension in second trimester   denies headaches visual changes epigastric pain DTRs 2+/4   7. High risk pregnancy, multigravida of advanced maternal age in second trimester     8. Rh negative status during pregnancy in first trimester   RhoGam given 11/4/2021 per epic   9. 31 weeks gestation of pregnancy       Bernardo Douglas MD  12/2/2021  10:26 CST

## 2021-12-06 ENCOUNTER — ROUTINE PRENATAL (OUTPATIENT)
Dept: OBSTETRICS AND GYNECOLOGY | Facility: CLINIC | Age: 36
End: 2021-12-06

## 2021-12-06 VITALS — DIASTOLIC BLOOD PRESSURE: 82 MMHG | SYSTOLIC BLOOD PRESSURE: 122 MMHG

## 2021-12-06 DIAGNOSIS — Z92.89 HISTORY OF TRANSFUSION OF PACKED RBC: ICD-10-CM

## 2021-12-06 DIAGNOSIS — Z98.891 HISTORY OF CESAREAN SECTION: ICD-10-CM

## 2021-12-06 DIAGNOSIS — O99.891 SHORTNESS OF BREATH DURING PREGNANCY: ICD-10-CM

## 2021-12-06 DIAGNOSIS — O28.8 NST (NON-STRESS TEST) NONREACTIVE: Primary | ICD-10-CM

## 2021-12-06 DIAGNOSIS — O10.912 MATERNAL CHRONIC HYPERTENSION IN SECOND TRIMESTER: ICD-10-CM

## 2021-12-06 DIAGNOSIS — O26.891 RH NEGATIVE STATUS DURING PREGNANCY IN FIRST TRIMESTER: ICD-10-CM

## 2021-12-06 DIAGNOSIS — Z67.91 RH NEGATIVE STATUS DURING PREGNANCY IN FIRST TRIMESTER: ICD-10-CM

## 2021-12-06 DIAGNOSIS — O24.112 PREGNANCY WITH TYPE 2 DIABETES MELLITUS IN SECOND TRIMESTER: ICD-10-CM

## 2021-12-06 DIAGNOSIS — R06.02 SHORTNESS OF BREATH DURING PREGNANCY: ICD-10-CM

## 2021-12-06 DIAGNOSIS — O21.9 NAUSEA AND VOMITING DURING PREGNANCY PRIOR TO 22 WEEKS GESTATION: ICD-10-CM

## 2021-12-06 DIAGNOSIS — O09.522 HIGH RISK PREGNANCY, MULTIGRAVIDA OF ADVANCED MATERNAL AGE IN SECOND TRIMESTER: ICD-10-CM

## 2021-12-06 DIAGNOSIS — Z3A.31 31 WEEKS GESTATION OF PREGNANCY: ICD-10-CM

## 2021-12-06 PROCEDURE — 99213 OFFICE O/P EST LOW 20 MIN: CPT | Performed by: OBSTETRICS & GYNECOLOGY

## 2021-12-06 PROCEDURE — 59025 FETAL NON-STRESS TEST: CPT | Performed by: OBSTETRICS & GYNECOLOGY

## 2021-12-06 NOTE — PROGRESS NOTES
CC: Prenatal visit    Donna Linder is a 36 y.o.  at 31w5d.  Doing well.  Denies contractions, LOF, or VB.  Reports good FM.    /82   LMP 2021 (Exact Date)      Fundal Height (cm): 33 cm  Fetal Heart Rate: 140     Problems (from 21 to present)     Problem Noted Resolved    History of  section 10/16/2021 by Bernardo Douglas MD No    Priority:  High      Overview Signed 10/16/2021 12:09 AM by Bernardo Douglas MD     Wants to have repeat  section after Tolac counseling         History of transfusion of packed RBC 10/16/2021 by Bernardo Douglas MD No    Priority:  High      Overview Signed 10/16/2021 12:13 AM by Bernardo Douglas MD     With  section.  History mild anemia.  Start iron         Shortness of breath during pregnancy 2021 by Bernardo Douglas MD No    Priority:  High      Overview Signed 2021 10:52 AM by Bernardo Douglas MD     Patient is short of breath during second trimester pregnancy but says it dates back to when she had Covid in January.  Is on albuterol inhaler         Nausea and vomiting during pregnancy prior to 22 weeks gestation 2021 by Argenis Winston APRN No    Maternal chronic hypertension in second trimester 2021 by Kaitlin Whyte APRN No    High risk pregnancy, multigravida of advanced maternal age in second trimester 2021 by Kaitlin Whyte APRN No    Rh negative status during pregnancy in first trimester 2021 by Kaitlin Whyte APRN No    Pregnancy with type 2 diabetes mellitus in second trimester 2021 by Kaitlin Whyte APRN No    Overview Addendum 2021 12:00 PM by Argenis Winston APRN     Dx at 5w6d on   EarlyGDM vs. Type 2 diabetes?  - A1c- 5.8    : Elevated fasting glucose. Metformin increased to 1000mg at bedtime and 500mg in the morning per Dr. Cummins    : Fasting sugars in the low 100s, improved from 120s. However,  Fasting never <95. 1 hr PP, 140 or less. Will  increased to 1500mg at bedtime and 500mg in the morning.             Previous Version    Pain of round ligament affecting pregnancy, antepartum 2021 by Argenis Winston APRN 2021 by Argenis Winston APRN    Overview Signed 2021 11:57 AM by Argenis Winston APRN     Cramping as of 13 weeks; pelvic belt given. Discussed physical therapy referral, pt declines at this time.                A/P: Donna Linder is a 36 y.o.  at 31w5d.  - RTC in 4 days biophysical  - Reviewed COVID-19 visitation policy  - Reviewed COVID-19 precautions     Diagnosis Plan   1. NST (non-stress test) nonreactive   NST equivocal biophysical    2. History of  section     3. History of transfusion of packed RBC   continue oral iron   4. Shortness of breath during pregnancy     5. Nausea and vomiting during pregnancy prior to 22 weeks gestation     6. Maternal chronic hypertension in second trimester   blood pressure good today denies headaches visual changes epigastric pain monitor blood pressure at home   7. High risk pregnancy, multigravida of advanced maternal age in second trimester     8. Rh negative status during pregnancy in first trimester     9. Pregnancy with type 2 diabetes mellitus in second trimester   says blood sugars are good seen by endocrinology fastings mid 90s 2-hour postprandials 120 or less.  Twice weekly fetal wellbeing testing strict kick counts   10. 31 weeks gestation of pregnancy       Bernardo Douglas MD  2021  17:44 CST

## 2021-12-08 ENCOUNTER — TELEPHONE (OUTPATIENT)
Dept: LABOR AND DELIVERY | Facility: HOSPITAL | Age: 36
End: 2021-12-08

## 2021-12-08 DIAGNOSIS — O10.913 MATERNAL CHRONIC HYPERTENSION IN THIRD TRIMESTER: Primary | ICD-10-CM

## 2021-12-08 NOTE — TELEPHONE ENCOUNTER
Motherhood Connection Check-In    Patient Name: Donna Linder   Preferred Name: Donna   Date/Time of Contact: 21 0911   Services: yes No  Demographics Reviewed: yes  Living Status/Arrangements: Home  May we continue to contact you by phone: yes By Mail: yes By email: yes    Providers:     Type of Provider Name Next Appointment   OB/GYN Stella 21   Primary Care Provider     Dental Provider     PEDS Provider Nicol Kelly    Speciality Provider       Frequency of OB/GYN Provider Visits: [] Every 2 wks  [x] wkly [] twice/wk [] Every 4 weeks [] other:     Able to keep appts as scheduled: Yes    Questions regarding prenatal visits or tests to be ordered: no    Other Providers/Services Presently Utilizing: WIC (Women, Infant, Children)    Have you seen a dentist in last 6 months: yes  OB/GYN Status    Significant Other/Support Person: Name:                     Relationship:     FRITZ 22        Based Upon LMP    GA: 32    Number of Fetuses: 1    Currently Employed: No    Patient's last menstrual period was 2021 (exact date).  Estimated Date of Delivery: 22     Gender(s) & Name(s): Judson- Norm  Currently Breastfeeding: yes  Baby active/feeling fetal movement: Yes  How are you presently feeling: feeling pretty well, seeing Dr Douglas twice weekly.    New Diagnosis (Also update in HX) [] Hyperemesis [] HTN (Type: [] Chronic [] Gestational) [] Pre-eclampsia [x] GDM  [] PTL [] Anxiety [] Depression [] Anemia [] STI [] Other  What questions can I help to answer such as questions related to your care experience, your pregnancy, plans for delivery, any concerns, etc.: none    Delivery Plans:  Method:  [] Vaginal  [x]  [] TOLAC   Anesthesia: [] None [] Epidural [x] Spinal [] Other :    Special Considerations: [] Birth Plan []  [] Birthing Ball [] Peanut Ball [] Other:      Support Person(s): Jhon Baxter  Post-Delivery Plans:   Pediatric Provider Chosen: Yes, describe: Nicol  Kelly  Adoption: no Circumcision for Male Baby: no  Feeding Intentions: [x] Breast Milk [] Formula [] Breast Milk and Formula  Own a Breast Pump: [] No [] Manual [x] Electric (Type/Brand:                   )  Birth Control: [x] Undecided [] Tubal Ligation (Discussed w/ Provider and Consent Signed: [] Yes [x] No  [] Other Planned Method of Birth Control    Immunizations:  Influenza: [] Not Yet [x] Refused [] Yes        Tdap: [] Not Yet [x] Refused [] Yes  Covid [x] Not Yet [] Refused [] Yes    Review for Changes in Social History  Social History:  Smoking Status: Never a smoker.  # Packs/Day  Passive Exposure: no  Counseling Given: no  Smokeless Tobacco: Never a smoker.  Alcohol Use: No   Drinks/wk: 0   Substance Use History: No  Substances Used & Use/Wk: 0    HARK Domestic Violence/Abuse Screening    Within the last year, have you been:  Humiliated or emotionally abused in other ways by your partner or ex-partner no  Afraid of your partner or ex-partner no  Raped or forced to have any kind of sexual activity by your partner or ex-partner no  Kicked, hit, slapped, or otherwise physically hurt by your partner or ex-partner no  Feels Unsafe at home or work/school: no  Feels Threatened by Someone no  Does anyone try to keep you from having contact with others/doing things outside your home: no  History of physical, mental, emotional, financial abuse/neglect: no    [place]  Depression Scale    Over the last 7 days:  I have been able to laugh and see the funny side of things: 0= As much as I always could  I have looked forward with enjoyment to things: 0= As much as I ever did  I have blamed myself unnecessarily when things went wron= No, never  I have been anxious or worried for no good reason: 2= Yes, sometimes  I have felt scared or panicky for no good reason: 0= No, never  Things have been getting on top of me: 0= No, I have been coping as well as ever  I have been so unhappy that I have had  difficulty sleepin= Not very often  I have felt sad or miserable: 0= No, not at all  I have been so unhappy that I have been cryin= No, never  The thought of harming myself has occurred to me: 0= Never  Total Score: 3    Spiritual, Cultural, Islam, Value Awareness  Any Spiritual, Cultural, or Islam Beliefs/Practices/Values that we need to be mindful of throughout your maternity experience: yes- Pentec[employer]alism dania.  Supplies ready for baby: [x] Car Seat [x] Crib [x] Clothing [x] Diapers [x] Feeding Supplies  Resource/Environmental Concerns: None    Disability/Function  Disabilities (hearing, seeing, concentrating, communicating, comprehension, performing activities of daily living): no  Accomodations/Assistive Devices Utililzed (e.g. hearing aids, sign language, braile, service animal, /aide, etc.): glasses  Equipment Presently Utilized at Home: [] Breast Pump [x] Glucometer [x] Blood Pressure Cuff [x] Other:     Preparing for Labor and Baby Education    Completed Childbirth Education Classes: [x] No/Declined [] Yes [] Requests more information  Lactation Education Classes: [x] No/Declined [] Yes [] Requests more information  [x] No/Declined [] Actively Participating [] Requests more information [] Other    Topic Educational Information Comments   Fetal Movement [] Provided   [x]Acknowledged [] Refused    Choosing a Pediatrician [] Provided   [x]Acknowledged [] Refused    Community Resources [] Provided   [x]Acknowledged [] Refused    Hospital Education Programs [] Provided   [x]Acknowledged [] Refused    [facility] Maternal-Child Department [] Provided   [x]Acknowledged [] Refused    Signs or Symptoms to Report [] Provided   [x]Acknowledged [] Refused    Other: [] Provided   [x]Acknowledged [] Refused    Other:  [] Provided   [x]Acknowledged [] Refused    Comments:  [] Provided   []Acknowledged [] Refused      Do you have the [employer] Elizabeth?  [x] YES   [] NO     MyChart: [x] YES   [] NO      Specific Resources Provided and Method: Normal Fetal Movement, WIC Benefits Sent via: Handouts Given    Intake Summary  [x] Periodic Check completed, will follow-up with patient: 4 weeks  [] Discussed community resources and information provided or assisted with appointments (see notes)  [] Other, including any provider notifications (see notes)  [] Declines further [employer] participation (see notes)  Clari Peterson, RN   Maternal Nurse Navigator

## 2021-12-09 ENCOUNTER — ROUTINE PRENATAL (OUTPATIENT)
Dept: OBSTETRICS AND GYNECOLOGY | Facility: CLINIC | Age: 36
End: 2021-12-09

## 2021-12-09 ENCOUNTER — HOSPITAL ENCOUNTER (OUTPATIENT)
Dept: ULTRASOUND IMAGING | Facility: HOSPITAL | Age: 36
Discharge: HOME | End: 2021-12-09
Admitting: OBSTETRICS & GYNECOLOGY

## 2021-12-09 VITALS — BODY MASS INDEX: 43.94 KG/M2 | DIASTOLIC BLOOD PRESSURE: 80 MMHG | SYSTOLIC BLOOD PRESSURE: 122 MMHG | WEIGHT: 256 LBS

## 2021-12-09 DIAGNOSIS — O99.891 SHORTNESS OF BREATH DURING PREGNANCY: ICD-10-CM

## 2021-12-09 DIAGNOSIS — R06.02 SHORTNESS OF BREATH DURING PREGNANCY: ICD-10-CM

## 2021-12-09 DIAGNOSIS — O24.112 PREGNANCY WITH TYPE 2 DIABETES MELLITUS IN SECOND TRIMESTER: ICD-10-CM

## 2021-12-09 DIAGNOSIS — Z92.89 HISTORY OF TRANSFUSION OF PACKED RBC: ICD-10-CM

## 2021-12-09 DIAGNOSIS — O21.9 NAUSEA AND VOMITING DURING PREGNANCY PRIOR TO 22 WEEKS GESTATION: ICD-10-CM

## 2021-12-09 DIAGNOSIS — Z3A.32 32 WEEKS GESTATION OF PREGNANCY: Primary | ICD-10-CM

## 2021-12-09 DIAGNOSIS — Z67.91 RH NEGATIVE STATUS DURING PREGNANCY IN FIRST TRIMESTER: ICD-10-CM

## 2021-12-09 DIAGNOSIS — O10.913 MATERNAL CHRONIC HYPERTENSION IN THIRD TRIMESTER: ICD-10-CM

## 2021-12-09 DIAGNOSIS — O10.912 MATERNAL CHRONIC HYPERTENSION IN SECOND TRIMESTER: ICD-10-CM

## 2021-12-09 DIAGNOSIS — O09.522 HIGH RISK PREGNANCY, MULTIGRAVIDA OF ADVANCED MATERNAL AGE IN SECOND TRIMESTER: ICD-10-CM

## 2021-12-09 DIAGNOSIS — Z98.891 HISTORY OF CESAREAN SECTION: ICD-10-CM

## 2021-12-09 DIAGNOSIS — O26.891 RH NEGATIVE STATUS DURING PREGNANCY IN FIRST TRIMESTER: ICD-10-CM

## 2021-12-09 PROCEDURE — 76819 FETAL BIOPHYS PROFIL W/O NST: CPT

## 2021-12-09 PROCEDURE — 99213 OFFICE O/P EST LOW 20 MIN: CPT | Performed by: OBSTETRICS & GYNECOLOGY

## 2021-12-10 NOTE — PROGRESS NOTES
CC: Prenatal visit    Donna Linder is a 36 y.o.  at 32w1d.  Doing well.  Denies contractions, LOF, or VB.  Reports good FM.    /80   Wt 116 kg (256 lb)   LMP 2021 (Exact Date)   BMI 43.94 kg/m²     Fundal Height (cm): 34 cm  Fetal Heart Rate: 144     Problems (from 21 to present)     Problem Noted Resolved    History of  section 10/16/2021 by Bernardo Douglas MD No    Priority:  High      Overview Signed 10/16/2021 12:09 AM by Bernardo Douglas MD     Wants to have repeat  section after Tolac counseling         History of transfusion of packed RBC 10/16/2021 by Bernardo Douglas MD No    Priority:  High      Overview Signed 10/16/2021 12:13 AM by Bernardo Douglas MD     With  section.  History mild anemia.  Start iron         Shortness of breath during pregnancy 2021 by Bernardo Douglas MD No    Priority:  High      Overview Signed 2021 10:52 AM by Bernardo Douglas MD     Patient is short of breath during second trimester pregnancy but says it dates back to when she had Covid in January.  Is on albuterol inhaler         Nausea and vomiting during pregnancy prior to 22 weeks gestation 2021 by Argenis Winston APRN No    Maternal chronic hypertension in second trimester 2021 by Kaitlin Whyte APRN No    High risk pregnancy, multigravida of advanced maternal age in second trimester 2021 by Kaitlin Whyte APRN No    Rh negative status during pregnancy in first trimester 2021 by Kaitlin Whyte APRN No    Pregnancy with type 2 diabetes mellitus in second trimester 2021 by Kaitlin Whtye APRN No    Overview Addendum 2021 12:00 PM by Argenis Winston APRN     Dx at 5w6d on   EarlyGDM vs. Type 2 diabetes?  - A1c- 5.8    : Elevated fasting glucose. Metformin increased to 1000mg at bedtime and 500mg in the morning per Dr. Cummins    7/30: Fasting sugars in the low 100s, improved from 120s. However,  Fasting  never <95. 1 hr PP, 140 or less. Will increased to 1500mg at bedtime and 500mg in the morning.             Previous Version    Pain of round ligament affecting pregnancy, antepartum 2021 by Argenis Winston APRN 2021 by Argenis Winston APRN    Overview Signed 2021 11:57 AM by Argenis Winston APRN     Cramping as of 13 weeks; pelvic belt given. Discussed physical therapy referral, pt declines at this time.                A/P: Donna Linder is a 36 y.o.  at 32w1d.  - RTC in 4 days  - Reviewed COVID-19 visitation policy  - Reviewed COVID-19 precautions     Diagnosis Plan   1. 32 weeks gestation of pregnancy     2. History of  section   Tolac counseling done is sure she wants   3. History of transfusion of packed RBC   continue iron therapy   4. Shortness of breath during pregnancy     5. Nausea and vomiting during pregnancy prior to 22 weeks gestation     6. Maternal chronic hypertension in second trimester   denies headaches epigastric DTRs 2+   7. High risk pregnancy, multigravida of advanced maternal age in second trimester     8. Rh negative status during pregnancy in first trimester     9. Pregnancy with type 2 diabetes mellitus in second trimester   says her blood sugars are good fastings mid 90s 2-hour postprandials 120s or less.  Biophysical profile 8/fetal wellbeing 4 days     Bernardo Douglas MD  2021  20:50 CST

## 2021-12-13 ENCOUNTER — ROUTINE PRENATAL (OUTPATIENT)
Dept: OBSTETRICS AND GYNECOLOGY | Facility: CLINIC | Age: 36
End: 2021-12-13

## 2021-12-13 VITALS — DIASTOLIC BLOOD PRESSURE: 82 MMHG | SYSTOLIC BLOOD PRESSURE: 124 MMHG | WEIGHT: 262.2 LBS | BODY MASS INDEX: 45.01 KG/M2

## 2021-12-13 DIAGNOSIS — O21.9 NAUSEA AND VOMITING DURING PREGNANCY PRIOR TO 22 WEEKS GESTATION: ICD-10-CM

## 2021-12-13 DIAGNOSIS — O26.891 RH NEGATIVE STATUS DURING PREGNANCY IN FIRST TRIMESTER: ICD-10-CM

## 2021-12-13 DIAGNOSIS — O28.8 NON-REACTIVE NST (NON-STRESS TEST): Primary | ICD-10-CM

## 2021-12-13 DIAGNOSIS — O99.891 SHORTNESS OF BREATH DURING PREGNANCY: ICD-10-CM

## 2021-12-13 DIAGNOSIS — Z98.891 HISTORY OF CESAREAN SECTION: ICD-10-CM

## 2021-12-13 DIAGNOSIS — Z67.91 RH NEGATIVE STATUS DURING PREGNANCY IN FIRST TRIMESTER: ICD-10-CM

## 2021-12-13 DIAGNOSIS — O24.112 PREGNANCY WITH TYPE 2 DIABETES MELLITUS IN SECOND TRIMESTER: ICD-10-CM

## 2021-12-13 DIAGNOSIS — Z92.89 HISTORY OF TRANSFUSION OF PACKED RBC: ICD-10-CM

## 2021-12-13 DIAGNOSIS — O09.522 HIGH RISK PREGNANCY, MULTIGRAVIDA OF ADVANCED MATERNAL AGE IN SECOND TRIMESTER: ICD-10-CM

## 2021-12-13 DIAGNOSIS — O10.912 MATERNAL CHRONIC HYPERTENSION IN SECOND TRIMESTER: ICD-10-CM

## 2021-12-13 DIAGNOSIS — R06.02 SHORTNESS OF BREATH DURING PREGNANCY: ICD-10-CM

## 2021-12-13 PROCEDURE — 99213 OFFICE O/P EST LOW 20 MIN: CPT | Performed by: OBSTETRICS & GYNECOLOGY

## 2021-12-13 PROCEDURE — 59025 FETAL NON-STRESS TEST: CPT | Performed by: OBSTETRICS & GYNECOLOGY

## 2021-12-15 NOTE — PROGRESS NOTES
CC: Prenatal visit    Donna Linder is a 36 y.o.  at 32w6d.  Doing well.  Denies contractions, LOF, or VB.  Reports good FM.    /82   Wt 119 kg (262 lb 3.2 oz)   LMP 2021 (Exact Date)   BMI 45.01 kg/m²               Problems (from 21 to present)     Problem Noted Resolved    History of  section 10/16/2021 by Bernardo Douglas MD No    Priority:  High      Overview Signed 10/16/2021 12:09 AM by Bernardo Douglas MD     Wants to have repeat  section after Tolac counseling         History of transfusion of packed RBC 10/16/2021 by Bernardo Douglas MD No    Priority:  High      Overview Signed 10/16/2021 12:13 AM by Bernardo Douglas MD     With  section.  History mild anemia.  Start iron         Shortness of breath during pregnancy 2021 by Bernardo Douglas MD No    Priority:  High      Overview Signed 2021 10:52 AM by Bernardo Douglas MD     Patient is short of breath during second trimester pregnancy but says it dates back to when she had Covid in January.  Is on albuterol inhaler         Nausea and vomiting during pregnancy prior to 22 weeks gestation 2021 by Argenis Winston APRN No    Maternal chronic hypertension in second trimester 2021 by Kaitlin Whyte APRN No    High risk pregnancy, multigravida of advanced maternal age in second trimester 2021 by Kaitlin Whyte APRN No    Rh negative status during pregnancy in first trimester 2021 by Kaitlin Whyte APRN No    Pregnancy with type 2 diabetes mellitus in second trimester 2021 by Kaitlin Whyte APRN No    Overview Addendum 2021 12:00 PM by Argenis Winston APRN     Dx at 5w6d on   EarlyGDM vs. Type 2 diabetes?  - A1c- 5.8    : Elevated fasting glucose. Metformin increased to 1000mg at bedtime and 500mg in the morning per Dr. Cummins    : Fasting sugars in the low 100s, improved from 120s. However,  Fasting never <95. 1 hr PP, 140 or less. Will  increased to 1500mg at bedtime and 500mg in the morning.             Previous Version    Pain of round ligament affecting pregnancy, antepartum 2021 by Argenis Winston APRN 2021 by Argenis Winston APRN    Overview Signed 2021 11:57 AM by Argenis Winston APRN     Cramping as of 13 weeks; pelvic belt given. Discussed physical therapy referral, pt declines at this time.                A/P: Donna Linder is a 36 y.o.  at 32w6d.  - RTC in 4 days s  - Reviewed COVID-19 visitation policy  - Reviewed COVID-19 precautions     Diagnosis Plan   1. Non-reactive NST (non-stress test)  US Fetal Biophysical Profile;Without Non-Stress Testing   2. History of  section   Tolac counseling done   3. History of transfusion of packed RBC   to consider recheck CBC next week continue iron   4. Shortness of breath during pregnancy     5. Nausea and vomiting during pregnancy prior to 22 weeks gestation     6. Maternal chronic hypertension in second trimester  Fetal Nonstress equivocal biophysical    7. High risk pregnancy, multigravida of advanced maternal age in second trimester     8. Rh negative status during pregnancy in first trimester     9. Pregnancy with type 2 diabetes mellitus in second trimester  Fetal Nonstress Test     Bernardo Douglas MD  2021  19:22 CST

## 2021-12-16 ENCOUNTER — ROUTINE PRENATAL (OUTPATIENT)
Dept: OBSTETRICS AND GYNECOLOGY | Facility: CLINIC | Age: 36
End: 2021-12-16

## 2021-12-16 ENCOUNTER — HOSPITAL ENCOUNTER (OUTPATIENT)
Dept: ULTRASOUND IMAGING | Facility: HOSPITAL | Age: 36
Discharge: HOME | End: 2021-12-16

## 2021-12-16 ENCOUNTER — LAB (OUTPATIENT)
Dept: LAB | Facility: HOSPITAL | Age: 36
End: 2021-12-16

## 2021-12-16 VITALS — BODY MASS INDEX: 44.8 KG/M2 | SYSTOLIC BLOOD PRESSURE: 130 MMHG | DIASTOLIC BLOOD PRESSURE: 82 MMHG | WEIGHT: 261 LBS

## 2021-12-16 DIAGNOSIS — Z67.91 RH NEGATIVE STATUS DURING PREGNANCY IN THIRD TRIMESTER: ICD-10-CM

## 2021-12-16 DIAGNOSIS — D50.9 IRON DEFICIENCY ANEMIA DURING PREGNANCY: Primary | ICD-10-CM

## 2021-12-16 DIAGNOSIS — O24.112 PREGNANCY WITH TYPE 2 DIABETES MELLITUS IN SECOND TRIMESTER: ICD-10-CM

## 2021-12-16 DIAGNOSIS — O26.893 RH NEGATIVE STATUS DURING PREGNANCY IN THIRD TRIMESTER: ICD-10-CM

## 2021-12-16 DIAGNOSIS — O99.019 MATERNAL ANEMIA IN PREGNANCY, ANTEPARTUM: Primary | ICD-10-CM

## 2021-12-16 DIAGNOSIS — Z98.891 HISTORY OF CESAREAN SECTION: ICD-10-CM

## 2021-12-16 DIAGNOSIS — R06.02 SHORTNESS OF BREATH DURING PREGNANCY: ICD-10-CM

## 2021-12-16 DIAGNOSIS — Z3A.33 33 WEEKS GESTATION OF PREGNANCY: Primary | ICD-10-CM

## 2021-12-16 DIAGNOSIS — O99.019 IRON DEFICIENCY ANEMIA DURING PREGNANCY: Primary | ICD-10-CM

## 2021-12-16 DIAGNOSIS — O99.019 MATERNAL ANEMIA IN PREGNANCY, ANTEPARTUM: ICD-10-CM

## 2021-12-16 DIAGNOSIS — O09.523 HIGH RISK PREGNANCY, MULTIGRAVIDA OF ADVANCED MATERNAL AGE IN THIRD TRIMESTER: ICD-10-CM

## 2021-12-16 DIAGNOSIS — Z92.89 HISTORY OF TRANSFUSION OF PACKED RBC: ICD-10-CM

## 2021-12-16 DIAGNOSIS — O24.113 PREGNANCY WITH TYPE 2 DIABETES MELLITUS IN THIRD TRIMESTER: ICD-10-CM

## 2021-12-16 DIAGNOSIS — O10.913 MATERNAL CHRONIC HYPERTENSION IN THIRD TRIMESTER: ICD-10-CM

## 2021-12-16 DIAGNOSIS — O99.891 SHORTNESS OF BREATH DURING PREGNANCY: ICD-10-CM

## 2021-12-16 PROBLEM — O34.13 UTERINE FIBROIDS AFFECTING PREGNANCY IN THIRD TRIMESTER: Status: ACTIVE | Noted: 2021-07-30

## 2021-12-16 PROBLEM — O21.9 NAUSEA AND VOMITING DURING PREGNANCY PRIOR TO 22 WEEKS GESTATION: Status: RESOLVED | Noted: 2021-06-25 | Resolved: 2021-12-16

## 2021-12-16 PROBLEM — Z3A.21 21 WEEKS GESTATION OF PREGNANCY: Status: RESOLVED | Noted: 2021-09-24 | Resolved: 2021-12-16

## 2021-12-16 LAB
DEPRECATED RDW RBC AUTO: 47.2 FL (ref 37–54)
ERYTHROCYTE [DISTWIDTH] IN BLOOD BY AUTOMATED COUNT: 15.1 % (ref 12.3–15.4)
HCT VFR BLD AUTO: 29.9 % (ref 34–46.6)
HGB BLD-MCNC: 9.7 G/DL (ref 12–15.9)
MCH RBC QN AUTO: 27.7 PG (ref 26.6–33)
MCHC RBC AUTO-ENTMCNC: 32.4 G/DL (ref 31.5–35.7)
MCV RBC AUTO: 85.4 FL (ref 79–97)
PLATELET # BLD AUTO: 483 10*3/MM3 (ref 140–450)
PMV BLD AUTO: 8.7 FL (ref 6–12)
RBC # BLD AUTO: 3.5 10*6/MM3 (ref 3.77–5.28)
WBC NRBC COR # BLD: 10.46 10*3/MM3 (ref 3.4–10.8)

## 2021-12-16 PROCEDURE — 85027 COMPLETE CBC AUTOMATED: CPT

## 2021-12-16 PROCEDURE — 36415 COLL VENOUS BLD VENIPUNCTURE: CPT

## 2021-12-16 PROCEDURE — 76819 FETAL BIOPHYS PROFIL W/O NST: CPT

## 2021-12-16 PROCEDURE — 99214 OFFICE O/P EST MOD 30 MIN: CPT | Performed by: NURSE PRACTITIONER

## 2021-12-16 RX ORDER — LABETALOL 100 MG/1
100 TABLET, FILM COATED ORAL 2 TIMES DAILY
Qty: 60 TABLET | Refills: 1 | Status: SHIPPED | OUTPATIENT
Start: 2021-12-16 | End: 2022-03-07 | Stop reason: SDUPTHER

## 2021-12-16 NOTE — PROGRESS NOTES
CC: Prenatal visit    Donna Linder is a 36 y.o.  at 33w1d.  Doing well.  No complaints.  Denies contractions, LOF, or VB.  Reports good FM.    /82   Wt 118 kg (261 lb)   LMP 2021 (Exact Date)   BMI 44.80 kg/m²   Prelim TAUS-  Fetus vertex, BPP , JAYMIE 12.30 cm           Problems (from 21 to present)     Problem Noted Resolved    History of  section 10/16/2021 by Bernardo Douglas MD No    Overview Signed 10/16/2021 12:09 AM by Bernardo Douglas MD     Wants to have repeat  section after Tolac counseling         History of transfusion of packed RBC 10/16/2021 by Bernardo Douglas MD No    Overview Signed 10/16/2021 12:13 AM by Bernardo Douglas MD     With  section.  History mild anemia.  Start iron         Shortness of breath during pregnancy 2021 by Bernardo Douglas MD No    Overview Signed 2021 10:52 AM by Bernardo Douglas MD     Patient is short of breath during second trimester pregnancy but says it dates back to when she had Covid in January.  Is on albuterol inhaler         Maternal chronic hypertension in third trimester 2021 by Kaitlin Whyte APRN No    High risk pregnancy, multigravida of advanced maternal age in third trimester 2021 by Kaitlin Whyte APRN No    Rh negative status during pregnancy in third trimester 2021 by Kaitlin Whyte APRN No    Pregnancy with type 2 diabetes mellitus in third trimester 2021 by Kaitlin Whyte APRN No    Overview Addendum 2021 12:00 PM by Argenis Winston APRN     Dx at 5w6d on   EarlyGDM vs. Type 2 diabetes?  - A1c- 5.8    : Elevated fasting glucose. Metformin increased to 1000mg at bedtime and 500mg in the morning per Dr. Cummins    : Fasting sugars in the low 100s, improved from 120s. However,  Fasting never <95. 1 hr PP, 140 or less. Will increased to 1500mg at bedtime and 500mg in the morning.             Previous Version    Pain of round ligament  affecting pregnancy, antepartum 2021 by Argenis Winston APRN 2021 by Argenis Winston APRN    Overview Signed 2021 11:57 AM by Argenis Winston APRN     Cramping as of 13 weeks; pelvic belt given. Discussed physical therapy referral, pt declines at this time.          Nausea and vomiting during pregnancy prior to 22 weeks gestation 2021 by Argenis Winston APRN 2021 by Lauren Rouse APRN          A/P: Donna Linder is a 36 y.o.  at 33w1d.  - RTC on 2021 for JURGEN mcfarland and US      Diagnosis Plan   1. 33 weeks gestation of pregnancy     2. High risk pregnancy, multigravida of advanced maternal age in third trimester     3. History of  section     4. History of transfusion of packed RBC     5. Shortness of breath during pregnancy     6. Maternal chronic hypertension in third trimester     7. Rh negative status during pregnancy in third trimester     8. Pregnancy with type 2 diabetes mellitus in third trimester         KEE Jaimes  2021  14:54 CST

## 2021-12-18 ENCOUNTER — HOSPITAL ENCOUNTER (OUTPATIENT)
Facility: HOSPITAL | Age: 36
Discharge: HOME | End: 2021-12-18
Attending: OBSTETRICS & GYNECOLOGY | Admitting: OBSTETRICS & GYNECOLOGY

## 2021-12-18 VITALS
TEMPERATURE: 98.3 F | DIASTOLIC BLOOD PRESSURE: 66 MMHG | SYSTOLIC BLOOD PRESSURE: 132 MMHG | OXYGEN SATURATION: 97 % | RESPIRATION RATE: 18 BRPM | HEART RATE: 86 BPM

## 2021-12-18 PROCEDURE — 59025 FETAL NON-STRESS TEST: CPT | Performed by: OBSTETRICS & GYNECOLOGY

## 2021-12-18 PROCEDURE — 59025 FETAL NON-STRESS TEST: CPT

## 2021-12-18 PROCEDURE — G0463 HOSPITAL OUTPT CLINIC VISIT: HCPCS

## 2021-12-19 NOTE — NON STRESS TEST
Donna Linder, a  at 33w3d with an FRITZ of 2022, by Last Menstrual Period, was seen at [facility] for a nonstress test.    Chief Complaint   Patient presents with   • Decreased Fetal Movement     Patient states that she shasnt felt the baby move since this morning but npatient reports she has been very busy today.  -vb, -lof       Patient Active Problem List   Diagnosis   • Acute pain of left wrist   • Rh negative status during pregnancy in third trimester   • Pregnancy with type 2 diabetes mellitus in third trimester   • Maternal chronic hypertension in third trimester   • High risk pregnancy, multigravida of advanced maternal age in third trimester   • Uterine fibroids affecting pregnancy in third trimester   • Shortness of breath during pregnancy   • Encounter for repeat ultrasound for fetal heart rate   • Multigravida of advanced maternal age in third trimester   • Morbid obesity with BMI of 45.0-49.9, adult (HCC)   • History of  section   • History of transfusion of packed RBC       Start Time:   Stop Time:     Interpretation A  Nonstress Test Interpretation A: Reactive (21 : Gabrielle Biggs RN)  Comments A: reviewed with [facility] (21 : Gabrielle Biggs, RN)      Pt reports positive fetal movements.

## 2021-12-20 ENCOUNTER — ROUTINE PRENATAL (OUTPATIENT)
Dept: OBSTETRICS AND GYNECOLOGY | Facility: CLINIC | Age: 36
End: 2021-12-20

## 2021-12-20 VITALS — WEIGHT: 262.4 LBS | SYSTOLIC BLOOD PRESSURE: 134 MMHG | BODY MASS INDEX: 45.04 KG/M2 | DIASTOLIC BLOOD PRESSURE: 86 MMHG

## 2021-12-20 DIAGNOSIS — O26.893 RH NEGATIVE STATUS DURING PREGNANCY IN THIRD TRIMESTER: ICD-10-CM

## 2021-12-20 DIAGNOSIS — O10.913 MATERNAL CHRONIC HYPERTENSION IN THIRD TRIMESTER: ICD-10-CM

## 2021-12-20 DIAGNOSIS — O24.113 PREGNANCY WITH TYPE 2 DIABETES MELLITUS IN THIRD TRIMESTER: ICD-10-CM

## 2021-12-20 DIAGNOSIS — Z92.89 HISTORY OF TRANSFUSION OF PACKED RBC: ICD-10-CM

## 2021-12-20 DIAGNOSIS — O99.891 SHORTNESS OF BREATH DURING PREGNANCY: ICD-10-CM

## 2021-12-20 DIAGNOSIS — Z67.91 RH NEGATIVE STATUS DURING PREGNANCY IN THIRD TRIMESTER: ICD-10-CM

## 2021-12-20 DIAGNOSIS — Z98.891 HISTORY OF CESAREAN SECTION: ICD-10-CM

## 2021-12-20 DIAGNOSIS — R06.02 SHORTNESS OF BREATH DURING PREGNANCY: ICD-10-CM

## 2021-12-20 DIAGNOSIS — O09.523 HIGH RISK PREGNANCY, MULTIGRAVIDA OF ADVANCED MATERNAL AGE IN THIRD TRIMESTER: ICD-10-CM

## 2021-12-20 PROCEDURE — 99214 OFFICE O/P EST MOD 30 MIN: CPT | Performed by: OBSTETRICS & GYNECOLOGY

## 2021-12-20 RX ORDER — SODIUM CHLORIDE, SODIUM LACTATE, POTASSIUM CHLORIDE, CALCIUM CHLORIDE 600; 310; 30; 20 MG/100ML; MG/100ML; MG/100ML; MG/100ML
125 INJECTION, SOLUTION INTRAVENOUS CONTINUOUS
Status: CANCELLED | OUTPATIENT
Start: 2021-12-20

## 2021-12-20 NOTE — PROGRESS NOTES
CC: Prenatal visit    Donna Linder is a 36 y.o.  at 33w5d.  Doing well.  Denies contractions, LOF, or VB.  Reports good FM.    /86   Wt 119 kg (262 lb 6.4 oz)   LMP 2021 (Exact Date)   BMI 45.04 kg/m²               Problems (from 21 to present)     Problem Noted Resolved    History of  section 10/16/2021 by Bernardo Douglas MD No    Priority:  High      Overview Signed 10/16/2021 12:09 AM by Bernardo Douglas MD     Wants to have repeat  section after Tolac counseling         History of transfusion of packed RBC 10/16/2021 by Bernardo Douglas MD No    Priority:  High      Overview Signed 10/16/2021 12:13 AM by Bernardo Douglas MD     With  section.  History mild anemia.  Start iron         Shortness of breath during pregnancy 2021 by Bernardo Douglas MD No    Priority:  High      Overview Signed 2021 10:52 AM by Bernardo Douglas MD     Patient is short of breath during second trimester pregnancy but says it dates back to when she had Covid in January.  Is on albuterol inhaler         Maternal chronic hypertension in third trimester 2021 by Kaitlin Whyte APRN No    High risk pregnancy, multigravida of advanced maternal age in third trimester 2021 by Kaitlin Whyte APRN No    Rh negative status during pregnancy in third trimester 2021 by Kaitlin Whyte APRN No    Pregnancy with type 2 diabetes mellitus in third trimester 2021 by Kaitlin Whyte APRN No    Overview Addendum 2021 12:00 PM by Argenis Winston APRN     Dx at 5w6d on   EarlyGDM vs. Type 2 diabetes?  - A1c- 5.8    : Elevated fasting glucose. Metformin increased to 1000mg at bedtime and 500mg in the morning per Dr. Cummins    : Fasting sugars in the low 100s, improved from 120s. However,  Fasting never <95. 1 hr PP, 140 or less. Will increased to 1500mg at bedtime and 500mg in the morning.             Previous Version    Pain of round ligament  affecting pregnancy, antepartum 2021 by Argenis Winston APRN 2021 by Argenis Winston APRN    Overview Signed 2021 11:57 AM by Argenis Winston APRN     Cramping as of 13 weeks; pelvic belt given. Discussed physical therapy referral, pt declines at this time.          Nausea and vomiting during pregnancy prior to 22 weeks gestation 2021 by Argenis Winston APRN 2021 by Lauren Rouse APRN          A/P: Donna Linder is a 36 y.o.  at 33w5d.  - RTC in 4 days  - Reviewed COVID-19 visitation policy  - Reviewed COVID-19 precautions     Diagnosis Plan   1. History of  section   Tolac counseling again done certain she wants repeat    2. History of transfusion of packed RBC   patient has been referred to heme-onc for consideration iron therapy as last hemoglobin was 9.7   3. Shortness of breath during pregnancy     4. Maternal chronic hypertension in third trimester   denies headaches visual changes epigastric pain   5. High risk pregnancy, multigravida of advanced maternal age in third trimester     6. Rh negative status during pregnancy in third trimester     7. Pregnancy with type 2 diabetes mellitus in third trimester   says blood sugars are good fastings in the mid 90s 2-hour postprandials mid 120s follow-up Dr. Harmeet Douglas MD  2021  17:21 CST

## 2021-12-21 ENCOUNTER — TELEPHONE (OUTPATIENT)
Dept: OBSTETRICS AND GYNECOLOGY | Facility: CLINIC | Age: 36
End: 2021-12-21

## 2021-12-21 RX ORDER — AMOXICILLIN 250 MG
1 CAPSULE ORAL 2 TIMES DAILY PRN
Qty: 60 TABLET | Refills: 1 | Status: SHIPPED | OUTPATIENT
Start: 2021-12-21 | End: 2022-02-22

## 2021-12-23 ENCOUNTER — LAB (OUTPATIENT)
Dept: ONCOLOGY | Facility: HOSPITAL | Age: 36
End: 2021-12-23

## 2021-12-23 ENCOUNTER — CONSULT (OUTPATIENT)
Dept: ONCOLOGY | Facility: CLINIC | Age: 36
End: 2021-12-23

## 2021-12-23 ENCOUNTER — ROUTINE PRENATAL (OUTPATIENT)
Dept: OBSTETRICS AND GYNECOLOGY | Facility: CLINIC | Age: 36
End: 2021-12-23

## 2021-12-23 VITALS — SYSTOLIC BLOOD PRESSURE: 128 MMHG | BODY MASS INDEX: 45.32 KG/M2 | WEIGHT: 264 LBS | DIASTOLIC BLOOD PRESSURE: 80 MMHG

## 2021-12-23 VITALS
OXYGEN SATURATION: 98 % | DIASTOLIC BLOOD PRESSURE: 82 MMHG | HEART RATE: 84 BPM | BODY MASS INDEX: 45.09 KG/M2 | SYSTOLIC BLOOD PRESSURE: 134 MMHG | WEIGHT: 262.7 LBS | TEMPERATURE: 97.1 F

## 2021-12-23 DIAGNOSIS — D50.9 IRON DEFICIENCY ANEMIA DURING PREGNANCY: ICD-10-CM

## 2021-12-23 DIAGNOSIS — Z3A.34 34 WEEKS GESTATION OF PREGNANCY: Primary | ICD-10-CM

## 2021-12-23 DIAGNOSIS — O09.523 HIGH RISK PREGNANCY, MULTIGRAVIDA OF ADVANCED MATERNAL AGE IN THIRD TRIMESTER: ICD-10-CM

## 2021-12-23 DIAGNOSIS — D64.9 ANEMIA, UNSPECIFIED TYPE: Primary | ICD-10-CM

## 2021-12-23 DIAGNOSIS — R06.02 SHORTNESS OF BREATH DURING PREGNANCY: ICD-10-CM

## 2021-12-23 DIAGNOSIS — Z92.89 HISTORY OF TRANSFUSION OF PACKED RBC: ICD-10-CM

## 2021-12-23 DIAGNOSIS — D75.839 THROMBOCYTOSIS: ICD-10-CM

## 2021-12-23 DIAGNOSIS — O10.913 MATERNAL CHRONIC HYPERTENSION IN THIRD TRIMESTER: ICD-10-CM

## 2021-12-23 DIAGNOSIS — O26.893 RH NEGATIVE STATUS DURING PREGNANCY IN THIRD TRIMESTER: ICD-10-CM

## 2021-12-23 DIAGNOSIS — O99.019 IRON DEFICIENCY ANEMIA DURING PREGNANCY: ICD-10-CM

## 2021-12-23 DIAGNOSIS — O99.891 SHORTNESS OF BREATH DURING PREGNANCY: ICD-10-CM

## 2021-12-23 DIAGNOSIS — Z98.891 HISTORY OF CESAREAN SECTION: ICD-10-CM

## 2021-12-23 DIAGNOSIS — O24.113 PREGNANCY WITH TYPE 2 DIABETES MELLITUS IN THIRD TRIMESTER: ICD-10-CM

## 2021-12-23 DIAGNOSIS — Z67.91 RH NEGATIVE STATUS DURING PREGNANCY IN THIRD TRIMESTER: ICD-10-CM

## 2021-12-23 PROBLEM — K90.9 IRON MALABSORPTION: Status: ACTIVE | Noted: 2021-12-23

## 2021-12-23 PROBLEM — T45.4X5A ADVERSE EFFECT OF IRON: Status: ACTIVE | Noted: 2021-12-23

## 2021-12-23 LAB
BASOPHILS # BLD AUTO: 0.03 10*3/MM3 (ref 0–0.2)
BASOPHILS NFR BLD AUTO: 0.2 % (ref 0–1.5)
DEPRECATED RDW RBC AUTO: 47.3 FL (ref 37–54)
EOSINOPHIL # BLD AUTO: 0.08 10*3/MM3 (ref 0–0.4)
EOSINOPHIL NFR BLD AUTO: 0.6 % (ref 0.3–6.2)
ERYTHROCYTE [DISTWIDTH] IN BLOOD BY AUTOMATED COUNT: 15.1 % (ref 12.3–15.4)
FERRITIN SERPL-MCNC: 20.19 NG/ML (ref 13–150)
FOLATE SERPL-MCNC: >20 NG/ML (ref 4.78–24.2)
HCT VFR BLD AUTO: 30.6 % (ref 34–46.6)
HGB BLD-MCNC: 9.9 G/DL (ref 12–15.9)
IMM GRANULOCYTES # BLD AUTO: 0.07 10*3/MM3 (ref 0–0.05)
IMM GRANULOCYTES NFR BLD AUTO: 0.5 % (ref 0–0.5)
IRON 24H UR-MRATE: 69 MCG/DL (ref 37–145)
IRON SATN MFR SERPL: 11 % (ref 20–50)
LYMPHOCYTES # BLD AUTO: 2.04 10*3/MM3 (ref 0.7–3.1)
LYMPHOCYTES NFR BLD AUTO: 16 % (ref 19.6–45.3)
MCH RBC QN AUTO: 27.7 PG (ref 26.6–33)
MCHC RBC AUTO-ENTMCNC: 32.4 G/DL (ref 31.5–35.7)
MCV RBC AUTO: 85.7 FL (ref 79–97)
MONOCYTES # BLD AUTO: 0.56 10*3/MM3 (ref 0.1–0.9)
MONOCYTES NFR BLD AUTO: 4.4 % (ref 5–12)
NEUTROPHILS NFR BLD AUTO: 78.3 % (ref 42.7–76)
NEUTROPHILS NFR BLD AUTO: 9.98 10*3/MM3 (ref 1.7–7)
NRBC BLD AUTO-RTO: 0 /100 WBC (ref 0–0.2)
PLATELET # BLD AUTO: 477 10*3/MM3 (ref 140–450)
PMV BLD AUTO: 9 FL (ref 6–12)
RBC # BLD AUTO: 3.57 10*6/MM3 (ref 3.77–5.28)
TIBC SERPL-MCNC: 600 MCG/DL (ref 298–536)
TRANSFERRIN SERPL-MCNC: 403 MG/DL (ref 200–360)
VIT B12 BLD-MCNC: 321 PG/ML (ref 211–946)
WBC NRBC COR # BLD: 12.76 10*3/MM3 (ref 3.4–10.8)

## 2021-12-23 PROCEDURE — 84466 ASSAY OF TRANSFERRIN: CPT | Performed by: INTERNAL MEDICINE

## 2021-12-23 PROCEDURE — 82746 ASSAY OF FOLIC ACID SERUM: CPT | Performed by: INTERNAL MEDICINE

## 2021-12-23 PROCEDURE — G0463 HOSPITAL OUTPT CLINIC VISIT: HCPCS | Performed by: INTERNAL MEDICINE

## 2021-12-23 PROCEDURE — 82728 ASSAY OF FERRITIN: CPT | Performed by: INTERNAL MEDICINE

## 2021-12-23 PROCEDURE — 99213 OFFICE O/P EST LOW 20 MIN: CPT | Performed by: OBSTETRICS & GYNECOLOGY

## 2021-12-23 PROCEDURE — 99204 OFFICE O/P NEW MOD 45 MIN: CPT | Performed by: INTERNAL MEDICINE

## 2021-12-23 PROCEDURE — 83540 ASSAY OF IRON: CPT | Performed by: INTERNAL MEDICINE

## 2021-12-23 PROCEDURE — 82607 VITAMIN B-12: CPT | Performed by: INTERNAL MEDICINE

## 2021-12-23 PROCEDURE — 1125F AMNT PAIN NOTED PAIN PRSNT: CPT | Performed by: INTERNAL MEDICINE

## 2021-12-23 PROCEDURE — 85025 COMPLETE CBC W/AUTO DIFF WBC: CPT | Performed by: INTERNAL MEDICINE

## 2021-12-23 RX ORDER — SODIUM CHLORIDE 9 MG/ML
250 INJECTION, SOLUTION INTRAVENOUS ONCE
Status: CANCELLED | OUTPATIENT
Start: 2021-12-28

## 2021-12-23 RX ORDER — ACETAMINOPHEN 325 MG/1
650 TABLET ORAL ONCE
Status: CANCELLED | OUTPATIENT
Start: 2021-12-28

## 2021-12-23 RX ORDER — DIPHENHYDRAMINE HCL 25 MG
25 CAPSULE ORAL ONCE
Status: CANCELLED | OUTPATIENT
Start: 2021-12-28

## 2021-12-23 NOTE — PROGRESS NOTES
DATE OF CONSULT: 2021    REQUESTING SOURCE:  Lauren Rouse APRN  [address] [place] [address] [place] [address]      REASON FOR CONSULTATION: Anemia, adverse effect of iron, thrombocytosis      HISTORY OF PRESENT ILLNESS:    36-year-old female with medical problem consisting of hypertension, gestational diabetes mellitus, fibromyalgia, polycystic ovarian syndrome, history of heavy menstrual bleeding prior to getting pregnant, uterine fibroid, history of blood transfusion requirement with first pregnancy 15 years ago has been referred to [facility] for further evaluation and recommendation regarding anemia during third trimester of pregnancy and thrombocytosis.  Patient states she has been on ferrous sulfate for last few months and for last 2 months she has been taking 2 tablets of ferrous sulfate daily.  Complains of severe constipation despite of taking stool softener since increasing dose of ferrous sulfate.  Denies any bleeding at present.  Complains of severe fatigue.  Complains of shortness of breath with exertion.  Complains of generalized swelling.  Denies any new lymph node enlargement.  Denies any history of DVT or pulmonary embolism.  Denies any personal history of malignancy.        PAST MEDICAL HISTORY:    Past Medical History:   Diagnosis Date   • Anemia    • Depression    • Diverticulitis    • Diverticulitis    • Endometriosis    • Fibromyalgia    • Gestational diabetes mellitus (GDM) in first trimester 2021   • History of transfusion    • Hypertension    • Kidney stones    • Ovarian cyst    • PCOS (polycystic ovarian syndrome)    • Peptic ulcer disease    • Polycystic ovary syndrome    • Urogenital trichomoniasis    • Uterine fibroids affecting pregnancy in second trimester 2021   • Varicella        PAST SURGICAL HISTORY:  Past Surgical History:   Procedure Laterality Date   • APPENDECTOMY     • BREAST EXCISIONAL BIOPSY Left    •  SECTION     • WRIST ARTHROSCOPY  W/ TRIANGULAR FIBROCARTILAGE REPAIR Left        ALLERGIES:    Allergies   Allergen Reactions   • Penicillins Other (See Comments)     unknown       SOCIAL HISTORY:   Social History     Tobacco Use   • Smoking status: Never Smoker   • Smokeless tobacco: Never Used   Vaping Use   • Vaping Use: Never used   Substance Use Topics   • Alcohol use: Not Currently     Comment: occasionally    • Drug use: No       CURRENT MEDICATIONS:    Current Outpatient Medications   Medication Sig Dispense Refill   • albuterol (ProAir RespiClick) 108 (90 Base) MCG/ACT inhaler Inhale 2 puffs Every 4 (Four) Hours As Needed for Wheezing. 1 each 11   • albuterol (PROVENTIL) (2.5 MG/3ML) 0.083% nebulizer solution Take 3 mL by nebulization As Needed.     • Alcohol Swabs 70 % pads 1 each 4 (Four) Times a Day. 120 each 12   • cefdinir (OMNICEF) 300 MG capsule Take 600 mg by mouth Daily.     • Continuous Blood Gluc  (Dexcom G6 ) device 1 each Continuous. USE AS DIRECTED FOR CONTINUOUS GLUCOSE MONITORING. 1 each 0   • Continuous Blood Gluc Sensor (Dexcom G6 Sensor) Every 10 (Ten) Days. Change sensor every 10 days. 9 each 3   • Continuous Blood Gluc Transmit (Dexcom G6 Transmitter) misc 1 each Every 3 (Three) Months. 1 each 3   • docusate sodium (Colace) 100 MG capsule Take 1 capsule by mouth 2 (Two) Times a Day. 60 capsule 6   • ferrous sulfate 325 (65 FE) MG tablet Take 1 tablet by mouth Daily With Breakfast. 60 tablet 6   • fluticasone (FLOVENT HFA) 220 MCG/ACT inhaler Inhale 2 puffs 2 (Two) Times a Day. 1 each 11   • glucose blood test strip Test blood sugar fasting and 1 hour after each meal (QID). 120 each 12   • glucose monitor monitoring kit 1 each Take As Directed. 1 each 0   • Insulin Glargine (Lantus SoloStar) 100 UNIT/ML injection pen INJECT 5 UNITS UNDER THE SKIN EVERY NIGHT AT BEDTIME (Patient taking differently: 22 Units. INJECT 22 UNITS UNDER THE SKIN EVERY NIGHT AT BEDTIME) 3 mL 11   • insulin glargine (LANTUS,  SEMGLEE) 100 UNIT/ML injection Inject  under the skin into the appropriate area as directed.     • Insulin Lispro, 1 Unit Dial, (HumaLOG KwikPen) 100 UNIT/ML solution pen-injector Up to 20 units with meals, E11.65 6 pen 11   • Insulin Pen Needle (Pen Needles) 32G X 4 MM misc 1 each 4 (Four) Times a Day. Use 4 x daily, Dx code E11.65 120 each 11   • labetalol (NORMODYNE) 100 MG tablet Take 1 tablet by mouth 2 (Two) Times a Day. 60 tablet 1   • Lancets (onetouch ultrasoft) lancets Test blood sugar fasting and 1 hour after each meal (QID) 120 each 12   • ondansetron (ZOFRAN) 4 MG tablet Take 4 mg by mouth Daily As Needed.     • polyethylene glycol (MiraLax) 17 g packet Take 17 g by mouth Daily. 20 each 12   • Prenatal Vit-Fe Fumarate-FA (Prenatal Vitamin) 27-0.8 MG tablet Take 1 tablet by mouth Daily. 90 tablet 4   • ProAir  (90 Base) MCG/ACT inhaler INHALE 2 PUFFS BY MOUTH EVERY 8 HOURS AS NEEDED     • sennosides-docusate (PERICOLACE) 8.6-50 MG per tablet Take 1 tablet by mouth 2 (Two) Times a Day As Needed for Constipation. 60 tablet 1   • vitamin B-6 (PYRIDOXINE) 25 MG tablet Take 1 tablet by mouth 2 (Two) Times a Day. 60 tablet 6     No current facility-administered medications for this visit.        HOME MEDICATIONS:   Current Outpatient Medications on File Prior to Visit   Medication Sig Dispense Refill   • albuterol (ProAir RespiClick) 108 (90 Base) MCG/ACT inhaler Inhale 2 puffs Every 4 (Four) Hours As Needed for Wheezing. 1 each 11   • albuterol (PROVENTIL) (2.5 MG/3ML) 0.083% nebulizer solution Take 3 mL by nebulization As Needed.     • Alcohol Swabs 70 % pads 1 each 4 (Four) Times a Day. 120 each 12   • cefdinir (OMNICEF) 300 MG capsule Take 600 mg by mouth Daily.     • Continuous Blood Gluc  (Dexcom G6 ) device 1 each Continuous. USE AS DIRECTED FOR CONTINUOUS GLUCOSE MONITORING. 1 each 0   • Continuous Blood Gluc Sensor (Dexcom G6 Sensor) Every 10 (Ten) Days. Change sensor every 10  days. 9 each 3   • Continuous Blood Gluc Transmit (Dexcom G6 Transmitter) misc 1 each Every 3 (Three) Months. 1 each 3   • docusate sodium (Colace) 100 MG capsule Take 1 capsule by mouth 2 (Two) Times a Day. 60 capsule 6   • ferrous sulfate 325 (65 FE) MG tablet Take 1 tablet by mouth Daily With Breakfast. 60 tablet 6   • fluticasone (FLOVENT HFA) 220 MCG/ACT inhaler Inhale 2 puffs 2 (Two) Times a Day. 1 each 11   • glucose blood test strip Test blood sugar fasting and 1 hour after each meal (QID). 120 each 12   • glucose monitor monitoring kit 1 each Take As Directed. 1 each 0   • Insulin Glargine (Lantus SoloStar) 100 UNIT/ML injection pen INJECT 5 UNITS UNDER THE SKIN EVERY NIGHT AT BEDTIME (Patient taking differently: 22 Units. INJECT 22 UNITS UNDER THE SKIN EVERY NIGHT AT BEDTIME) 3 mL 11   • insulin glargine (LANTUS, SEMGLEE) 100 UNIT/ML injection Inject  under the skin into the appropriate area as directed.     • Insulin Lispro, 1 Unit Dial, (HumaLOG KwikPen) 100 UNIT/ML solution pen-injector Up to 20 units with meals, E11.65 6 pen 11   • Insulin Pen Needle (Pen Needles) 32G X 4 MM misc 1 each 4 (Four) Times a Day. Use 4 x daily, Dx code E11.65 120 each 11   • labetalol (NORMODYNE) 100 MG tablet Take 1 tablet by mouth 2 (Two) Times a Day. 60 tablet 1   • Lancets (onetouch ultrasoft) lancets Test blood sugar fasting and 1 hour after each meal (QID) 120 each 12   • ondansetron (ZOFRAN) 4 MG tablet Take 4 mg by mouth Daily As Needed.     • polyethylene glycol (MiraLax) 17 g packet Take 17 g by mouth Daily. 20 each 12   • Prenatal Vit-Fe Fumarate-FA (Prenatal Vitamin) 27-0.8 MG tablet Take 1 tablet by mouth Daily. 90 tablet 4   • ProAir  (90 Base) MCG/ACT inhaler INHALE 2 PUFFS BY MOUTH EVERY 8 HOURS AS NEEDED     • sennosides-docusate (PERICOLACE) 8.6-50 MG per tablet Take 1 tablet by mouth 2 (Two) Times a Day As Needed for Constipation. 60 tablet 1   • vitamin B-6 (PYRIDOXINE) 25 MG tablet Take 1 tablet  by mouth 2 (Two) Times a Day. 60 tablet 6     No current facility-administered medications on file prior to visit.       FAMILY HISTORY:    Family History   Problem Relation Age of Onset   • Hypertension Mother    • Diabetes Mother    • Hypertension Father    • Diabetes Father    • Kidney cancer Father    • Diabetes Sister    • Strabismus Daughter    • No Known Problems Maternal Grandfather    • Diabetes Paternal Grandmother    • Colon cancer Paternal Grandfather    • No Known Problems Sister        REVIEW OF SYSTEMS:        CONSTITUTIONAL:  Complains of fatigue. Denies any fever, chills or weight loss.     EYES: No visual disturbances. No discharge. No new lesion.    ENMT:  No epistaxis, mouth sores or difficulty swallowing.    RESPIRATORY: Positive for shortness of breath on minimal exertion. No new cough or hemoptysis.    CARDIOVASCULAR:  No chest pain or palpitations.    GASTROINTESTINAL: Positive for constipation despite of taking stool softeners.  No abdominal pain nausea, vomiting or blood in the stool.    GENITOURINARY: No dysuria or hematuria.    MUSCULOSKELETAL: Positive for swelling affecting upper and lower extremity    LYMPHATICS:  Denies any abnormal swollen glands anywhere in the body.    NEUROLOGICAL : No tingling or numbness. No headache or dizziness. No seizures or balance problems.    SKIN: No new skin lesion.    ENDOCRINE : No new hot or cold intolerance. No new polyuria. No polydipsia.        PHYSICAL EXAMINATION:      VITAL SIGNS:  /82   Pulse 84   Temp 97.1 °F (36.2 °C)   Wt 119 kg (262 lb 11.2 oz)   LMP 04/28/2021 (Exact Date)   SpO2 98%   BMI 45.09 kg/m²       12/23/21  1134   Weight: 119 kg (262 lb 11.2 oz)       ECOG performance status: 1    CONSTITUTIONAL:  Not in any distress.    EYES: Mild conjunctival pallor. No Icterus. No pterygium. Extraocular movements intact. No ptosis.    ENMT:  Normocephalic, atraumatic. No facial asymmetry noted.    NECK:  No adenopathy. Trachea  midline. No JVD.    RESPIRATORY:  Fair air entry bilateral. No rhonchi or wheezing. Fair respiratory effort.    CARDIOVASCULAR:  S1, S2. Regular rate and rhythm. No murmur or gallop appreciated.    MUSCULOSKELETAL: Upper extremity and lower extremity edema present. No calf tenderness. Normal range of motion.    NEUROLOGIC:    No motor  deficit appreciated. Cranial nerves II-XII grossly intact.    SKIN : No new skin lesion identified. Skin is warm and dry to touch.    LYMPHATICS: No new enlarged lymph nodes in neck or supraclavicular area.    PSYCHIATRY: Alert, awake and oriented ×3. Normal affect.  Normal judgment.  Makes good eye contact.          DIAGNOSTIC DATA:    WBC   Date Value Ref Range Status   12/23/2021 12.76 (H) 3.40 - 10.80 10*3/mm3 Final     RBC   Date Value Ref Range Status   12/23/2021 3.57 (L) 3.77 - 5.28 10*6/mm3 Final     Hemoglobin   Date Value Ref Range Status   12/23/2021 9.9 (L) 12.0 - 15.9 g/dL Final     Hematocrit   Date Value Ref Range Status   12/23/2021 30.6 (L) 34.0 - 46.6 % Final     MCV   Date Value Ref Range Status   12/23/2021 85.7 79.0 - 97.0 fL Final     MCH   Date Value Ref Range Status   12/23/2021 27.7 26.6 - 33.0 pg Final     MCHC   Date Value Ref Range Status   12/23/2021 32.4 31.5 - 35.7 g/dL Final     RDW   Date Value Ref Range Status   12/23/2021 15.1 12.3 - 15.4 % Final     RDW-SD   Date Value Ref Range Status   12/23/2021 47.3 37.0 - 54.0 fl Final     MPV   Date Value Ref Range Status   12/23/2021 9.0 6.0 - 12.0 fL Final     Platelets   Date Value Ref Range Status   12/23/2021 477 (H) 140 - 450 10*3/mm3 Final     Neutrophil %   Date Value Ref Range Status   12/23/2021 78.3 (H) 42.7 - 76.0 % Final     Lymphocyte %   Date Value Ref Range Status   12/23/2021 16.0 (L) 19.6 - 45.3 % Final     Monocyte %   Date Value Ref Range Status   12/23/2021 4.4 (L) 5.0 - 12.0 % Final     Eosinophil %   Date Value Ref Range Status   12/23/2021 0.6 0.3 - 6.2 % Final     Basophil %   Date  Value Ref Range Status   12/23/2021 0.2 0.0 - 1.5 % Final     Immature Grans %   Date Value Ref Range Status   12/23/2021 0.5 0.0 - 0.5 % Final     Neutrophils, Absolute   Date Value Ref Range Status   12/23/2021 9.98 (H) 1.70 - 7.00 10*3/mm3 Final     Lymphocytes, Absolute   Date Value Ref Range Status   12/23/2021 2.04 0.70 - 3.10 10*3/mm3 Final     Monocytes, Absolute   Date Value Ref Range Status   12/23/2021 0.56 0.10 - 0.90 10*3/mm3 Final     Eosinophils, Absolute   Date Value Ref Range Status   12/23/2021 0.08 0.00 - 0.40 10*3/mm3 Final     Basophils, Absolute   Date Value Ref Range Status   12/23/2021 0.03 0.00 - 0.20 10*3/mm3 Final     Immature Grans, Absolute   Date Value Ref Range Status   12/23/2021 0.07 (H) 0.00 - 0.05 10*3/mm3 Final     nRBC   Date Value Ref Range Status   12/23/2021 0.0 0.0 - 0.2 /100 WBC Final     Glucose   Date Value Ref Range Status   07/04/2021 95 65 - 99 mg/dL Final     Sodium   Date Value Ref Range Status   07/04/2021 136 136 - 145 mmol/L Final   03/19/2021 140 136 - 145 mmol/L Final     Potassium   Date Value Ref Range Status   07/04/2021 3.6 3.5 - 5.2 mmol/L Final   03/19/2021 3.7 3.5 - 5.1 mmol/L Final     CO2   Date Value Ref Range Status   07/04/2021 23.0 22.0 - 29.0 mmol/L Final     Total CO2   Date Value Ref Range Status   03/19/2021 27 21 - 31 mmol/L Final     Chloride   Date Value Ref Range Status   07/04/2021 101 98 - 107 mmol/L Final   03/19/2021 105 98 - 107 mmol/L Final     Anion Gap   Date Value Ref Range Status   07/04/2021 12.0 5.0 - 15.0 mmol/L Final   03/19/2021 12 4 - 16 mmol/L Final     Creatinine   Date Value Ref Range Status   07/04/2021 0.78 0.57 - 1.00 mg/dL Final   03/19/2021 0.9 0.7 - 1.3 mg/dL Final     BUN   Date Value Ref Range Status   07/04/2021 10 6 - 20 mg/dL Final   03/19/2021 13 7 - 25 mg/dL Final     BUN/Creatinine Ratio   Date Value Ref Range Status   07/04/2021 12.8 7.0 - 25.0 Final     Calcium   Date Value Ref Range Status   07/04/2021 9.9  8.6 - 10.5 mg/dL Final   03/19/2021 9.4 8.6 - 10.3 mg/dL Final     eGFR Non  Amer   Date Value Ref Range Status   07/04/2021 83 >60 mL/min/1.73 Final     Alkaline Phosphatase   Date Value Ref Range Status   06/08/2021 69 39 - 117 U/L Final     Total Protein   Date Value Ref Range Status   06/08/2021 7.3 6.0 - 8.5 g/dL Final     ALT (SGPT)   Date Value Ref Range Status   06/08/2021 16 1 - 33 U/L Final     AST (SGOT)   Date Value Ref Range Status   06/08/2021 12 1 - 32 U/L Final     Total Bilirubin   Date Value Ref Range Status   06/08/2021 0.3 0.0 - 1.2 mg/dL Final     Albumin   Date Value Ref Range Status   06/08/2021 4.20 3.50 - 5.20 g/dL Final     Globulin   Date Value Ref Range Status   06/08/2021 3.1 gm/dL Final     Lab Results   Component Value Date    IRON 69 12/23/2021    TIBC 600 (H) 12/23/2021    LABIRON 11 (L) 12/23/2021    FERRITIN 20.19 12/23/2021    [id number] 321 12/23/2021    FOLATE >20.00 12/23/2021     Lab Results   Component Value Date    HCGQUANT 50,797.00 07/04/2021         Radiology Data :  US Fetal Biophysical Profile;Without Non-Stress Testing    Result Date: 12/23/2021  CONCLUSION: Fetal biophysical profile eight out of eight. [id number] Electronically signed by:  Billy Owusu MD  12/23/2021 9:48 AM CST Workstation: [id number]    US Fetal Biophysical Profile;Without Non-Stress Testing    Result Date: 12/20/2021  1. Single live intrauterine gestation cephalic presentation. 2. Biophysical profile score is 8/8. 3. Amniotic fluid index measures 12 cm. Electronically signed by:  Zac Wray MD  12/20/2021 2:17 PM CST Workstation: [id number]    US Fetal Biophysical Profile;Without Non-Stress Testing    Result Date: 12/17/2021  Biparietal diameter 8/8. Motion, 2 Tone, 2 Breathing, 2 Fluid, 2 Electronically signed by:  Tien Meneses MD  12/17/2021 12:19 PM CST Workstation: [id number]      Pathology :  * Cannot find OR log *    ASSESSMENT AND PLAN:      1.  Iron deficiency anemia:  -Anemia work-up  done earlier today shows hemoglobin is 9.9.  Iron studies are consistent with severe iron deficiency with iron saturation of 11% and TIBC of 600 with ferritin of 20  -Patient has been taking ferrous sulfate for last few months.  Patient is having side effects from ferrous sulfate including constipation as well as iron malabsorption due to no improvement in iron level.  -Recommend starting intravenous Venofer starting next week.  Side effect of Venofer including allergic reaction were discussed with patient.  Will recommend decreasing ferrous sulfate to 1 tablet p.o. daily.  -Patient is currently taking B6 and prenatal vitamin  -B12 level is 321.  We'll start patient on B12 1000 mcg p.o. daily.  -We will have patient return to clinic in 2 months with repeat CBC, iron studies, ferritin, B12 and folate to be done on that day    2.  Thrombocytosis:  -Reactive due to iron deficiency  -Platelet count today's 477,000.  Will replace iron deficiency prior to any further work-up for thrombocytosis    3.  Third trimester of pregnancy:  -Continue with management as per obstetrician team    4.  Health maintenance: Patient does not smoke    5.  Leukocytosis:  -White blood cell count is borderline elevated at 12,000.  Will monitor with CBC for now    6.  Prescriptions: Prescription for B12 has been sent to her pharmacy today.      PHQ-9 Total Score: 0   -Patient is not homicidal or suicidal.  No acute intervention required.      Donna Linder reports a pain score of 4.  Given her pain assessment as noted, treatment options were discussed and the following options were decided upon as a follow-up plan to address the patient's pain: continuation of current treatment plan for pain.             Thank you for this consultation.    Saul Savage MD  12/24/2021  12:07 CST        Part of this note may be an electronic transcription/translation of spoken language to printed text using the Dragon Dictation System.

## 2021-12-24 RX ORDER — LANOLIN ALCOHOL/MO/W.PET/CERES
1000 CREAM (GRAM) TOPICAL DAILY
Qty: 90 TABLET | Refills: 1 | Status: SHIPPED | OUTPATIENT
Start: 2021-12-24

## 2021-12-24 NOTE — PROGRESS NOTES
CC: Prenatal visit    Donna Linder is a 36 y.o.  at 34w1d.  Doing well.  Denies contractions, LOF, or VB.  Reports good FM.    /80   Wt 120 kg (264 lb)   LMP 2021 (Exact Date)   BMI 45.32 kg/m²     Fundal Height (cm): 36 cm  Fetal Heart Rate: 146     Problems (from 21 to present)     Problem Noted Resolved    History of  section 10/16/2021 by Bernardo Douglas MD No    Priority:  High      Overview Signed 10/16/2021 12:09 AM by Bernardo Douglas MD     Wants to have repeat  section after Tolac counseling         History of transfusion of packed RBC 10/16/2021 by Bernardo Douglas MD No    Priority:  High      Overview Signed 10/16/2021 12:13 AM by Bernardo Douglas MD     With  section.  History mild anemia.  Start iron         Shortness of breath during pregnancy 2021 by Bernardo Douglas MD No    Priority:  High      Overview Signed 2021 10:52 AM by Bernardo Douglas MD     Patient is short of breath during second trimester pregnancy but says it dates back to when she had Covid in January.  Is on albuterol inhaler         Maternal chronic hypertension in third trimester 2021 by Katilin Whyte APRN No    High risk pregnancy, multigravida of advanced maternal age in third trimester 2021 by Kaitlin Whyte APRN No    Rh negative status during pregnancy in third trimester 2021 by Kaitlin Whyte APRN No    Pregnancy with type 2 diabetes mellitus in third trimester 2021 by Kaitlin Whyte APRN No    Overview Addendum 2021 12:00 PM by Argenis Winston APRN     Dx at 5w6d on   EarlyGDM vs. Type 2 diabetes?  - A1c- 5.8    : Elevated fasting glucose. Metformin increased to 1000mg at bedtime and 500mg in the morning per Dr. Cummins    : Fasting sugars in the low 100s, improved from 120s. However,  Fasting never <95. 1 hr PP, 140 or less. Will increased to 1500mg at bedtime and 500mg in the morning.              Previous Version    Pain of round ligament affecting pregnancy, antepartum 2021 by Argenis Winston APRN 2021 by Argenis Winston APRN    Overview Signed 2021 11:57 AM by Argenis Winston APRN     Cramping as of 13 weeks; pelvic belt given. Discussed physical therapy referral, pt declines at this time.          Nausea and vomiting during pregnancy prior to 22 weeks gestation 2021 by Argenis Winston APRN 2021 by Lauren Rouse APRN          A/P: Donna Linder is a 36 y.o.  at 34w1d.  - RTC in 4 days for nonstress test we can arrange for biophysical profile  - Reviewed COVID-19 visitation policy  - Reviewed COVID-19 precautions     Diagnosis Plan   1. 34 weeks gestation of pregnancy     2. History of  section   Tolac counseling done is sure she wants to have a repeat    3. History of transfusion of packed RBC   she has appointment to see heme-onc because hemoglobin is 9   4. Shortness of breath during pregnancy     5. Maternal chronic hypertension in third trimester  US Fetal Biophysical Profile;Without Non-Stress Testing no headaches visual change pain    US Ob Follow Up Transabdominal Approach   6. High risk pregnancy, multigravida of advanced maternal age in third trimester     7. Rh negative status during pregnancy in third trimester   RhoGam given 2021   8. Pregnancy with type 2 diabetes mellitus in third trimester  US Fetal Biophysical Profile;Without Non-Stress Testing    US Ob Follow Up Transabdominal Approach     Bernardo Douglas MD  2021  19:56 CST

## 2021-12-27 ENCOUNTER — TELEPHONE (OUTPATIENT)
Dept: ONCOLOGY | Facility: CLINIC | Age: 36
End: 2021-12-27

## 2021-12-27 ENCOUNTER — ROUTINE PRENATAL (OUTPATIENT)
Dept: OBSTETRICS AND GYNECOLOGY | Facility: CLINIC | Age: 36
End: 2021-12-27

## 2021-12-27 DIAGNOSIS — Z67.91 RH NEGATIVE STATUS DURING PREGNANCY IN THIRD TRIMESTER: ICD-10-CM

## 2021-12-27 DIAGNOSIS — O10.913 MATERNAL CHRONIC HYPERTENSION IN THIRD TRIMESTER: Primary | ICD-10-CM

## 2021-12-27 DIAGNOSIS — O24.113 PREGNANCY WITH TYPE 2 DIABETES MELLITUS IN THIRD TRIMESTER: ICD-10-CM

## 2021-12-27 DIAGNOSIS — Z3A.34 34 WEEKS GESTATION OF PREGNANCY: ICD-10-CM

## 2021-12-27 DIAGNOSIS — Z98.891 HISTORY OF CESAREAN SECTION: ICD-10-CM

## 2021-12-27 DIAGNOSIS — Z92.89 HISTORY OF TRANSFUSION OF PACKED RBC: ICD-10-CM

## 2021-12-27 DIAGNOSIS — O99.891 SHORTNESS OF BREATH DURING PREGNANCY: ICD-10-CM

## 2021-12-27 DIAGNOSIS — R06.02 SHORTNESS OF BREATH DURING PREGNANCY: ICD-10-CM

## 2021-12-27 DIAGNOSIS — O26.893 RH NEGATIVE STATUS DURING PREGNANCY IN THIRD TRIMESTER: ICD-10-CM

## 2021-12-27 DIAGNOSIS — O09.523 HIGH RISK PREGNANCY, MULTIGRAVIDA OF ADVANCED MATERNAL AGE IN THIRD TRIMESTER: ICD-10-CM

## 2021-12-27 PROCEDURE — 99213 OFFICE O/P EST LOW 20 MIN: CPT | Performed by: OBSTETRICS & GYNECOLOGY

## 2021-12-27 NOTE — TELEPHONE ENCOUNTER
Called and spoke with pt regarding lab results and med instructions as per Dr. Savage, v/u obtained.

## 2021-12-27 NOTE — TELEPHONE ENCOUNTER
----- Message from Saul Savage MD sent at 12/27/2021 11:15 AM CST -----  She can decrease it to 1 tablet p.o. daily after intravenous iron.  Thank you

## 2021-12-27 NOTE — TELEPHONE ENCOUNTER
----- Message from Saul Savage MD sent at 12/24/2021 12:11 PM CST -----  Please let patient know, her iron level is still lower than normal.  Recommend intravenous Venofer starting next week.  Recommend decreasing ferrous sulfate to 1 tablet p.o. daily.  B12 level is borderline low, recommend starting B12 p.o. daily.  I have sent prescription for B12 to her pharmacy.  Thank you

## 2021-12-28 VITALS
WEIGHT: 261.13 LBS | DIASTOLIC BLOOD PRESSURE: 82 MMHG | BODY MASS INDEX: 44.82 KG/M2 | SYSTOLIC BLOOD PRESSURE: 127 MMHG

## 2021-12-28 NOTE — PROGRESS NOTES
CC: Prenatal visit    Donna Linder is a 36 y.o.  at 34w6d.  Doing well.  Denies contractions, LOF, or VB.  Reports good FM.    /82   Wt 118 kg (261 lb 2 oz)   LMP 2021 (Exact Date)   BMI 44.82 kg/m²   Fundal Height (cm): 35 cm  Fetal Heart Rate: 160     Problems (from 21 to present)     Problem Noted Resolved    History of  section 10/16/2021 by Bernardo Douglas MD No    Priority:  High      Overview Signed 10/16/2021 12:09 AM by Bernardo Douglas MD     Wants to have repeat  section after Tolac counseling         History of transfusion of packed RBC 10/16/2021 by Bernardo Douglas MD No    Priority:  High      Overview Signed 10/16/2021 12:13 AM by Bernardo Douglas MD     With  section.  History mild anemia.  Start iron         Shortness of breath during pregnancy 2021 by Bernardo Douglas MD No    Priority:  High      Overview Signed 2021 10:52 AM by Bernardo Douglas MD     Patient is short of breath during second trimester pregnancy but says it dates back to when she had Covid in January.  Is on albuterol inhaler         Maternal chronic hypertension in third trimester 2021 by Kaitlin Whyte APRN No    High risk pregnancy, multigravida of advanced maternal age in third trimester 2021 by Kaitlin Whyte APRN No    Rh negative status during pregnancy in third trimester 2021 by Kaitlin Whyte APRN No    Pregnancy with type 2 diabetes mellitus in third trimester 2021 by Kaitlin Whyte APRN No    Overview Addendum 2021 12:00 PM by Argenis Winston APRN     Dx at 5w6d on   EarlyGDM vs. Type 2 diabetes?  - A1c- 5.8    : Elevated fasting glucose. Metformin increased to 1000mg at bedtime and 500mg in the morning per Dr. Cummins    : Fasting sugars in the low 100s, improved from 120s. However,  Fasting never <95. 1 hr PP, 140 or less. Will increased to 1500mg at bedtime and 500mg in the morning.              Previous Version    Pain of round ligament affecting pregnancy, antepartum 2021 by Argenis Winston APRN 2021 by Argenis Winston APRN    Overview Signed 2021 11:57 AM by Argenis Winston APRN     Cramping as of 13 weeks; pelvic belt given. Discussed physical therapy referral, pt declines at this time.          Nausea and vomiting during pregnancy prior to 22 weeks gestation 2021 by Argenis Winston APRN 2021 by Lauren Rouse APRN          A/P: Donna Linder is a 36 y.o.  at 34w6d.  - RTC in 4 days for BPP  - Reviewed COVID-19 visitation policy  - Reviewed COVID-19 precautions     Diagnosis Plan   1. Maternal chronic hypertension in third trimester  US Fetal Biophysical Profile;Without Non-Stress Testing no headaches visual changes or epigastric pain    Fetal Nonstress Test   2. Pregnancy with type 2 diabetes mellitus in third trimester  US Fetal Biophysical Profile;Without Non-Stress Testing says her blood sugars are doing good fastings in the mid 90s 2-hour postprandials less than 120    Fetal Nonstress Test   3. History of  section     4. History of transfusion of packed RBC   on iron transfusion   5. Shortness of breath during pregnancy     6. High risk pregnancy, multigravida of advanced maternal age in third trimester     7. Rh negative status during pregnancy in third trimester     8. 34 weeks gestation of pregnancy     Micah mccullough emphasized  Bernardo Douglas MD  2021  14:17 CST

## 2021-12-29 ENCOUNTER — INFUSION (OUTPATIENT)
Dept: ONCOLOGY | Facility: HOSPITAL | Age: 36
End: 2021-12-29

## 2021-12-29 VITALS
RESPIRATION RATE: 18 BRPM | DIASTOLIC BLOOD PRESSURE: 67 MMHG | TEMPERATURE: 97.6 F | HEART RATE: 87 BPM | SYSTOLIC BLOOD PRESSURE: 126 MMHG

## 2021-12-29 DIAGNOSIS — K90.9 IRON MALABSORPTION: ICD-10-CM

## 2021-12-29 DIAGNOSIS — T45.4X5A ADVERSE EFFECT OF IRON, INITIAL ENCOUNTER: ICD-10-CM

## 2021-12-29 DIAGNOSIS — D50.8 OTHER IRON DEFICIENCY ANEMIA: Primary | ICD-10-CM

## 2021-12-29 PROCEDURE — 96365 THER/PROPH/DIAG IV INF INIT: CPT | Performed by: INTERNAL MEDICINE

## 2021-12-29 PROCEDURE — 25010000002 IRON SUCROSE PER 1 MG: Performed by: INTERNAL MEDICINE

## 2021-12-29 PROCEDURE — 63710000001 DIPHENHYDRAMINE PER 50 MG: Performed by: INTERNAL MEDICINE

## 2021-12-29 RX ORDER — SODIUM CHLORIDE 9 MG/ML
250 INJECTION, SOLUTION INTRAVENOUS ONCE
Status: COMPLETED | OUTPATIENT
Start: 2021-12-29 | End: 2021-12-29

## 2021-12-29 RX ORDER — FLUCONAZOLE 200 MG/1
200 TABLET ORAL DAILY
Qty: 2 TABLET | Refills: 0 | OUTPATIENT
Start: 2021-12-29

## 2021-12-29 RX ORDER — ACETAMINOPHEN 325 MG/1
650 TABLET ORAL ONCE
Status: CANCELLED | OUTPATIENT
Start: 2021-12-31

## 2021-12-29 RX ORDER — SODIUM CHLORIDE 9 MG/ML
250 INJECTION, SOLUTION INTRAVENOUS ONCE
Status: CANCELLED | OUTPATIENT
Start: 2021-12-31

## 2021-12-29 RX ORDER — DIPHENHYDRAMINE HCL 25 MG
25 CAPSULE ORAL ONCE
Status: COMPLETED | OUTPATIENT
Start: 2021-12-29 | End: 2021-12-29

## 2021-12-29 RX ORDER — ONDANSETRON 4 MG/1
TABLET, FILM COATED ORAL
Qty: 30 TABLET | OUTPATIENT
Start: 2021-12-29

## 2021-12-29 RX ORDER — ACETAMINOPHEN 325 MG/1
650 TABLET ORAL ONCE
Status: COMPLETED | OUTPATIENT
Start: 2021-12-29 | End: 2021-12-29

## 2021-12-29 RX ORDER — DIPHENHYDRAMINE HCL 25 MG
25 CAPSULE ORAL ONCE
Status: CANCELLED | OUTPATIENT
Start: 2021-12-31

## 2021-12-29 RX ADMIN — ACETAMINOPHEN 650 MG: 325 TABLET, FILM COATED ORAL at 13:29

## 2021-12-29 RX ADMIN — SODIUM CHLORIDE 250 ML: 9 INJECTION, SOLUTION INTRAVENOUS at 13:36

## 2021-12-29 RX ADMIN — IRON SUCROSE 200 MG: 20 INJECTION, SOLUTION INTRAVENOUS at 14:02

## 2021-12-29 RX ADMIN — DIPHENHYDRAMINE HYDROCHLORIDE 25 MG: 25 CAPSULE ORAL at 13:29

## 2021-12-30 ENCOUNTER — INFUSION (OUTPATIENT)
Dept: ONCOLOGY | Facility: HOSPITAL | Age: 36
End: 2021-12-30

## 2021-12-30 ENCOUNTER — APPOINTMENT (OUTPATIENT)
Dept: ULTRASOUND IMAGING | Facility: HOSPITAL | Age: 36
End: 2021-12-30

## 2021-12-30 VITALS
HEART RATE: 83 BPM | DIASTOLIC BLOOD PRESSURE: 63 MMHG | RESPIRATION RATE: 18 BRPM | TEMPERATURE: 98 F | SYSTOLIC BLOOD PRESSURE: 144 MMHG

## 2021-12-30 DIAGNOSIS — K90.9 IRON MALABSORPTION: ICD-10-CM

## 2021-12-30 DIAGNOSIS — D50.8 OTHER IRON DEFICIENCY ANEMIA: Primary | ICD-10-CM

## 2021-12-30 DIAGNOSIS — T45.4X5A ADVERSE EFFECT OF IRON, INITIAL ENCOUNTER: ICD-10-CM

## 2021-12-30 PROCEDURE — 25010000002 IRON SUCROSE PER 1 MG: Performed by: INTERNAL MEDICINE

## 2021-12-30 PROCEDURE — 96365 THER/PROPH/DIAG IV INF INIT: CPT | Performed by: INTERNAL MEDICINE

## 2021-12-30 PROCEDURE — 63710000001 DIPHENHYDRAMINE PER 50 MG: Performed by: INTERNAL MEDICINE

## 2021-12-30 RX ORDER — DIPHENHYDRAMINE HCL 25 MG
25 CAPSULE ORAL ONCE
Status: COMPLETED | OUTPATIENT
Start: 2021-12-30 | End: 2021-12-30

## 2021-12-30 RX ORDER — SODIUM CHLORIDE 9 MG/ML
250 INJECTION, SOLUTION INTRAVENOUS ONCE
Status: CANCELLED | OUTPATIENT
Start: 2021-12-31

## 2021-12-30 RX ORDER — DIPHENHYDRAMINE HCL 25 MG
25 CAPSULE ORAL ONCE
Status: CANCELLED | OUTPATIENT
Start: 2021-12-31

## 2021-12-30 RX ORDER — ACETAMINOPHEN 325 MG/1
650 TABLET ORAL ONCE
Status: CANCELLED | OUTPATIENT
Start: 2021-12-31

## 2021-12-30 RX ORDER — SODIUM CHLORIDE 9 MG/ML
250 INJECTION, SOLUTION INTRAVENOUS ONCE
Status: COMPLETED | OUTPATIENT
Start: 2021-12-30 | End: 2021-12-30

## 2021-12-30 RX ORDER — ACETAMINOPHEN 325 MG/1
650 TABLET ORAL ONCE
Status: COMPLETED | OUTPATIENT
Start: 2021-12-30 | End: 2021-12-30

## 2021-12-30 RX ORDER — METFORMIN HYDROCHLORIDE 500 MG/1
TABLET, EXTENDED RELEASE ORAL
Qty: 120 TABLET | Refills: 3 | OUTPATIENT
Start: 2021-12-30

## 2021-12-30 RX ORDER — ONDANSETRON 4 MG/1
TABLET, FILM COATED ORAL
Qty: 30 TABLET | Refills: 1 | Status: SHIPPED | OUTPATIENT
Start: 2021-12-30 | End: 2022-01-13

## 2021-12-30 RX ADMIN — DIPHENHYDRAMINE HYDROCHLORIDE 25 MG: 25 CAPSULE ORAL at 14:26

## 2021-12-30 RX ADMIN — IRON SUCROSE 200 MG: 20 INJECTION, SOLUTION INTRAVENOUS at 14:56

## 2021-12-30 RX ADMIN — ACETAMINOPHEN 650 MG: 325 TABLET, FILM COATED ORAL at 14:26

## 2021-12-30 RX ADMIN — SODIUM CHLORIDE 250 ML: 9 INJECTION, SOLUTION INTRAVENOUS at 14:55

## 2021-12-31 ENCOUNTER — APPOINTMENT (OUTPATIENT)
Dept: ULTRASOUND IMAGING | Facility: HOSPITAL | Age: 36
End: 2021-12-31

## 2021-12-31 ENCOUNTER — HOSPITAL ENCOUNTER (OUTPATIENT)
Facility: HOSPITAL | Age: 36
Discharge: HOME | End: 2021-12-31
Attending: OBSTETRICS & GYNECOLOGY | Admitting: OBSTETRICS & GYNECOLOGY

## 2021-12-31 VITALS
WEIGHT: 256 LBS | TEMPERATURE: 98.6 F | RESPIRATION RATE: 18 BRPM | BODY MASS INDEX: 42.65 KG/M2 | HEART RATE: 91 BPM | DIASTOLIC BLOOD PRESSURE: 67 MMHG | SYSTOLIC BLOOD PRESSURE: 132 MMHG | HEIGHT: 65 IN | OXYGEN SATURATION: 96 %

## 2021-12-31 PROCEDURE — G0463 HOSPITAL OUTPT CLINIC VISIT: HCPCS

## 2021-12-31 PROCEDURE — 59025 FETAL NON-STRESS TEST: CPT

## 2021-12-31 PROCEDURE — 59025 FETAL NON-STRESS TEST: CPT | Performed by: OBSTETRICS & GYNECOLOGY

## 2021-12-31 PROCEDURE — 76819 FETAL BIOPHYS PROFIL W/O NST: CPT

## 2022-01-03 ENCOUNTER — INFUSION (OUTPATIENT)
Dept: ONCOLOGY | Facility: HOSPITAL | Age: 37
End: 2022-01-03

## 2022-01-03 ENCOUNTER — ROUTINE PRENATAL (OUTPATIENT)
Dept: OBSTETRICS AND GYNECOLOGY | Facility: CLINIC | Age: 37
End: 2022-01-03

## 2022-01-03 VITALS — WEIGHT: 261.2 LBS | BODY MASS INDEX: 43.47 KG/M2 | DIASTOLIC BLOOD PRESSURE: 72 MMHG | SYSTOLIC BLOOD PRESSURE: 132 MMHG

## 2022-01-03 VITALS
TEMPERATURE: 96.1 F | SYSTOLIC BLOOD PRESSURE: 125 MMHG | DIASTOLIC BLOOD PRESSURE: 71 MMHG | RESPIRATION RATE: 20 BRPM | HEART RATE: 95 BPM

## 2022-01-03 DIAGNOSIS — O24.113 PREGNANCY WITH TYPE 2 DIABETES MELLITUS IN THIRD TRIMESTER: ICD-10-CM

## 2022-01-03 DIAGNOSIS — K90.9 IRON MALABSORPTION: ICD-10-CM

## 2022-01-03 DIAGNOSIS — T45.4X5A ADVERSE EFFECT OF IRON, INITIAL ENCOUNTER: ICD-10-CM

## 2022-01-03 DIAGNOSIS — O26.893 RH NEGATIVE STATUS DURING PREGNANCY IN THIRD TRIMESTER: ICD-10-CM

## 2022-01-03 DIAGNOSIS — D50.8 OTHER IRON DEFICIENCY ANEMIA: Primary | ICD-10-CM

## 2022-01-03 DIAGNOSIS — D50.9 IRON DEFICIENCY ANEMIA DURING PREGNANCY: ICD-10-CM

## 2022-01-03 DIAGNOSIS — O99.019 IRON DEFICIENCY ANEMIA DURING PREGNANCY: ICD-10-CM

## 2022-01-03 DIAGNOSIS — R06.02 SHORTNESS OF BREATH DURING PREGNANCY: ICD-10-CM

## 2022-01-03 DIAGNOSIS — O99.891 SHORTNESS OF BREATH DURING PREGNANCY: ICD-10-CM

## 2022-01-03 DIAGNOSIS — Z98.891 HISTORY OF CESAREAN SECTION: Primary | ICD-10-CM

## 2022-01-03 DIAGNOSIS — O10.913 MATERNAL CHRONIC HYPERTENSION IN THIRD TRIMESTER: ICD-10-CM

## 2022-01-03 DIAGNOSIS — Z67.91 RH NEGATIVE STATUS DURING PREGNANCY IN THIRD TRIMESTER: ICD-10-CM

## 2022-01-03 DIAGNOSIS — O99.613 CONSTIPATION DURING PREGNANCY IN THIRD TRIMESTER: ICD-10-CM

## 2022-01-03 DIAGNOSIS — K59.00 CONSTIPATION DURING PREGNANCY IN THIRD TRIMESTER: ICD-10-CM

## 2022-01-03 DIAGNOSIS — Z92.89 HISTORY OF TRANSFUSION OF PACKED RBC: ICD-10-CM

## 2022-01-03 DIAGNOSIS — O09.523 HIGH RISK PREGNANCY, MULTIGRAVIDA OF ADVANCED MATERNAL AGE IN THIRD TRIMESTER: ICD-10-CM

## 2022-01-03 PROBLEM — O99.619 CONSTIPATION IN PREGNANCY: Status: ACTIVE | Noted: 2022-01-03

## 2022-01-03 PROCEDURE — 99214 OFFICE O/P EST MOD 30 MIN: CPT | Performed by: STUDENT IN AN ORGANIZED HEALTH CARE EDUCATION/TRAINING PROGRAM

## 2022-01-03 PROCEDURE — 25010000002 IRON SUCROSE PER 1 MG: Performed by: INTERNAL MEDICINE

## 2022-01-03 PROCEDURE — 96365 THER/PROPH/DIAG IV INF INIT: CPT | Performed by: INTERNAL MEDICINE

## 2022-01-03 PROCEDURE — 63710000001 DIPHENHYDRAMINE PER 50 MG: Performed by: INTERNAL MEDICINE

## 2022-01-03 RX ORDER — DIPHENHYDRAMINE HCL 25 MG
25 CAPSULE ORAL ONCE
Status: COMPLETED | OUTPATIENT
Start: 2022-01-03 | End: 2022-01-03

## 2022-01-03 RX ORDER — ACETAMINOPHEN 325 MG/1
650 TABLET ORAL ONCE
Status: COMPLETED | OUTPATIENT
Start: 2022-01-03 | End: 2022-01-03

## 2022-01-03 RX ORDER — DIPHENHYDRAMINE HCL 25 MG
25 CAPSULE ORAL ONCE
Status: CANCELLED | OUTPATIENT
Start: 2022-01-04

## 2022-01-03 RX ORDER — ACETAMINOPHEN 325 MG/1
650 TABLET ORAL ONCE
Status: CANCELLED | OUTPATIENT
Start: 2022-01-04

## 2022-01-03 RX ORDER — SODIUM CHLORIDE 9 MG/ML
250 INJECTION, SOLUTION INTRAVENOUS ONCE
Status: COMPLETED | OUTPATIENT
Start: 2022-01-03 | End: 2022-01-03

## 2022-01-03 RX ORDER — SODIUM CHLORIDE 9 MG/ML
250 INJECTION, SOLUTION INTRAVENOUS ONCE
Status: CANCELLED | OUTPATIENT
Start: 2022-01-04

## 2022-01-03 RX ADMIN — SODIUM CHLORIDE 250 ML: 9 INJECTION, SOLUTION INTRAVENOUS at 10:50

## 2022-01-03 RX ADMIN — IRON SUCROSE 200 MG: 20 INJECTION, SOLUTION INTRAVENOUS at 11:23

## 2022-01-03 RX ADMIN — DIPHENHYDRAMINE HYDROCHLORIDE 25 MG: 25 CAPSULE ORAL at 10:49

## 2022-01-03 RX ADMIN — ACETAMINOPHEN 650 MG: 325 TABLET, FILM COATED ORAL at 10:49

## 2022-01-03 NOTE — PROGRESS NOTES
CC: Prenatal visit    Donna Linder is a 36 y.o.  at 35w5d.  Doing well.  Denies regular painful contractions, LOF, or VB.  Was seen in triage on  for  contractions without labor.  Reports good FM.    Did not bring log but reports BG: fasting 90-96, 1 h pp around 135 (reports none >140)  /72   Wt 118 kg (261 lb 3.2 oz)   LMP 2021 (Exact Date)   BMI 43.47 kg/m²   SVE: deferred  Fundal Height (cm): 37 cm  Fetal Heart Rate: 139 US    Prelim US: BPP , , cephalic, posterior placenta, JAYMIE 14.32 cm, DVP 5.16 cm, small separation of membrane edge of placenta maternal left; evaluate again on repeat imaging    A/P: Donna Linder is a 36 y.o.  at 35w5d.  - RTC on , plan for GBS at next visit  - Growth US added to next BPP  - Reviewed COVID-19 visitation policy  - Reviewed COVID-19 precautions    Problem List Items Addressed This Visit        Endocrine and Metabolic    Pregnancy with type 2 diabetes mellitus in third trimester    Overview     Dx at 5w6d on   EarlyGDM vs. Type 2 diabetes?  - A1c- 5.8    : Elevated fasting glucose. Metformin increased to 1000mg at bedtime and 500mg in the morning per Dr. Cummins    : Fasting sugars in the low 100s, improved from 120s. However,  Fasting never <95. 1 hr PP, 140 or less. Will increased to 1500mg at bedtime and 500mg in the morning.    1/3/22: seeing Dr. Ga, did not bring log, reports values mostly WNL on Lantus 22 qhs, Humalog tid with meals SSI with carb count usu 14-20 units    Twice weekly antepartum surveillance, normal fetal echo                Gravid and     Rh negative status during pregnancy in third trimester    Overview     S/p Rhogam         Maternal chronic hypertension in third trimester    Overview     Labetalol 100 mg bid         High risk pregnancy, multigravida of advanced maternal age in third trimester    Shortness of breath during pregnancy    Overview     Patient is  short of breath during second trimester pregnancy but says it dates back to when she had Covid in January.  Is on albuterol inhaler         History of  section - Primary    Overview     Wants to have repeat  section after Tolac counseling         Constipation in pregnancy    Overview     On colace, miralax bid, stimulant bid which helps some  Po iron now qd instead of bid            Hematology and Neoplasia    History of transfusion of packed RBC    Overview     With  section.  History mild anemia.  Start iron         Iron deficiency anemia during pregnancy    Overview     Seeing heme onc, receiving IV iron, po iron qd, and po B12                  Diagnosis Plan   1. History of  section     2. History of transfusion of packed RBC     3. Shortness of breath during pregnancy     4. Maternal chronic hypertension in third trimester     5. High risk pregnancy, multigravida of advanced maternal age in third trimester     6. Rh negative status during pregnancy in third trimester     7. Pregnancy with type 2 diabetes mellitus in third trimester     8. Constipation during pregnancy in third trimester     9. Iron deficiency anemia during pregnancy       Moni Hubbard DO  1/3/2022  19:28 CST

## 2022-01-04 ENCOUNTER — INFUSION (OUTPATIENT)
Dept: ONCOLOGY | Facility: HOSPITAL | Age: 37
End: 2022-01-04

## 2022-01-04 ENCOUNTER — APPOINTMENT (OUTPATIENT)
Dept: ONCOLOGY | Facility: HOSPITAL | Age: 37
End: 2022-01-04

## 2022-01-04 VITALS
RESPIRATION RATE: 20 BRPM | SYSTOLIC BLOOD PRESSURE: 134 MMHG | DIASTOLIC BLOOD PRESSURE: 62 MMHG | TEMPERATURE: 96.2 F | HEART RATE: 84 BPM

## 2022-01-04 DIAGNOSIS — K90.9 IRON MALABSORPTION: ICD-10-CM

## 2022-01-04 DIAGNOSIS — D50.8 OTHER IRON DEFICIENCY ANEMIA: Primary | ICD-10-CM

## 2022-01-04 DIAGNOSIS — T45.4X5A ADVERSE EFFECT OF IRON, INITIAL ENCOUNTER: ICD-10-CM

## 2022-01-04 PROCEDURE — 63710000001 DIPHENHYDRAMINE PER 50 MG: Performed by: INTERNAL MEDICINE

## 2022-01-04 PROCEDURE — 25010000002 IRON SUCROSE PER 1 MG: Performed by: INTERNAL MEDICINE

## 2022-01-04 PROCEDURE — 96365 THER/PROPH/DIAG IV INF INIT: CPT | Performed by: INTERNAL MEDICINE

## 2022-01-04 RX ORDER — DIPHENHYDRAMINE HCL 25 MG
25 CAPSULE ORAL ONCE
Status: CANCELLED | OUTPATIENT
Start: 2022-01-05

## 2022-01-04 RX ORDER — SODIUM CHLORIDE 9 MG/ML
250 INJECTION, SOLUTION INTRAVENOUS ONCE
Status: COMPLETED | OUTPATIENT
Start: 2022-01-04 | End: 2022-01-04

## 2022-01-04 RX ORDER — DIPHENHYDRAMINE HCL 25 MG
CAPSULE ORAL
Status: DISCONTINUED
Start: 2022-01-04 | End: 2022-01-04 | Stop reason: HOSPADM

## 2022-01-04 RX ORDER — ACETAMINOPHEN 325 MG/1
650 TABLET ORAL ONCE
Status: COMPLETED | OUTPATIENT
Start: 2022-01-04 | End: 2022-01-04

## 2022-01-04 RX ORDER — ACETAMINOPHEN 325 MG/1
650 TABLET ORAL ONCE
Status: CANCELLED | OUTPATIENT
Start: 2022-01-05

## 2022-01-04 RX ORDER — SODIUM CHLORIDE 9 MG/ML
250 INJECTION, SOLUTION INTRAVENOUS ONCE
Status: CANCELLED | OUTPATIENT
Start: 2022-01-05

## 2022-01-04 RX ORDER — DIPHENHYDRAMINE HCL 25 MG
25 CAPSULE ORAL ONCE
Status: COMPLETED | OUTPATIENT
Start: 2022-01-04 | End: 2022-01-04

## 2022-01-04 RX ADMIN — ACETAMINOPHEN 650 MG: 325 TABLET, FILM COATED ORAL at 09:03

## 2022-01-04 RX ADMIN — IRON SUCROSE 200 MG: 20 INJECTION, SOLUTION INTRAVENOUS at 09:41

## 2022-01-04 RX ADMIN — DIPHENHYDRAMINE HYDROCHLORIDE 25 MG: 25 CAPSULE ORAL at 09:03

## 2022-01-04 RX ADMIN — SODIUM CHLORIDE 250 ML: 9 INJECTION, SOLUTION INTRAVENOUS at 09:41

## 2022-01-05 ENCOUNTER — TELEPHONE (OUTPATIENT)
Dept: LABOR AND DELIVERY | Facility: HOSPITAL | Age: 37
End: 2022-01-05

## 2022-01-05 ENCOUNTER — INFUSION (OUTPATIENT)
Dept: ONCOLOGY | Facility: HOSPITAL | Age: 37
End: 2022-01-05

## 2022-01-05 ENCOUNTER — APPOINTMENT (OUTPATIENT)
Dept: ONCOLOGY | Facility: HOSPITAL | Age: 37
End: 2022-01-05

## 2022-01-05 VITALS
DIASTOLIC BLOOD PRESSURE: 68 MMHG | HEART RATE: 102 BPM | RESPIRATION RATE: 18 BRPM | SYSTOLIC BLOOD PRESSURE: 146 MMHG | TEMPERATURE: 96 F

## 2022-01-05 DIAGNOSIS — D50.8 OTHER IRON DEFICIENCY ANEMIA: Primary | ICD-10-CM

## 2022-01-05 DIAGNOSIS — T45.4X5A ADVERSE EFFECT OF IRON, INITIAL ENCOUNTER: ICD-10-CM

## 2022-01-05 DIAGNOSIS — K90.9 IRON MALABSORPTION: ICD-10-CM

## 2022-01-05 PROCEDURE — 25010000002 IRON SUCROSE PER 1 MG: Performed by: INTERNAL MEDICINE

## 2022-01-05 PROCEDURE — 96365 THER/PROPH/DIAG IV INF INIT: CPT | Performed by: INTERNAL MEDICINE

## 2022-01-05 RX ORDER — DIPHENHYDRAMINE HCL 25 MG
25 CAPSULE ORAL ONCE
Status: DISCONTINUED | OUTPATIENT
Start: 2022-01-05 | End: 2022-01-05 | Stop reason: HOSPADM

## 2022-01-05 RX ORDER — ACETAMINOPHEN 325 MG/1
650 TABLET ORAL ONCE
Status: COMPLETED | OUTPATIENT
Start: 2022-01-05 | End: 2022-01-05

## 2022-01-05 RX ORDER — SODIUM CHLORIDE 9 MG/ML
250 INJECTION, SOLUTION INTRAVENOUS ONCE
Status: COMPLETED | OUTPATIENT
Start: 2022-01-05 | End: 2022-01-05

## 2022-01-05 RX ORDER — ACETAMINOPHEN 325 MG/1
650 TABLET ORAL ONCE
Status: CANCELLED | OUTPATIENT
Start: 2022-01-07

## 2022-01-05 RX ORDER — SODIUM CHLORIDE 9 MG/ML
250 INJECTION, SOLUTION INTRAVENOUS ONCE
Status: CANCELLED | OUTPATIENT
Start: 2022-01-07

## 2022-01-05 RX ORDER — DIPHENHYDRAMINE HCL 25 MG
25 CAPSULE ORAL ONCE
Status: CANCELLED | OUTPATIENT
Start: 2022-01-07

## 2022-01-05 RX ADMIN — SODIUM CHLORIDE 250 ML: 9 INJECTION, SOLUTION INTRAVENOUS at 10:18

## 2022-01-05 RX ADMIN — ACETAMINOPHEN 650 MG: 325 TABLET, FILM COATED ORAL at 09:47

## 2022-01-05 RX ADMIN — IRON SUCROSE 200 MG: 20 INJECTION, SOLUTION INTRAVENOUS at 10:18

## 2022-01-05 NOTE — TELEPHONE ENCOUNTER
Motherhood Connection Check-In    Patient Name: Donna Linder   Preferred Name: Donna   Date/Time of Contact: 22 0830   Services: no No  Demographics Reviewed: yes  Living Status/Arrangements: Home  May we continue to contact you by phone: yes By Mail: yes By email: yes    Providers:     Type of Provider Name Next Appointment   OB/GYN Stella    Primary Care Provider     Dental Provider     PEDS Provider Nicol Kelly    Speciality Provider       Frequency of OB/GYN Provider Visits: [] Every 2 wks  [x] wkly [] twice/wk [] Every 4 weeks [] other:     Able to keep appts as scheduled: Yes    Questions regarding prenatal visits or tests to be ordered: no    Other Providers/Services Presently Utilizing: WIC (Women, Infant, Children)    Have you seen a dentist in last 6 months: yes  OB/GYN Status    Significant Other/Support Person: Name:                     Relationship:     FRITZ 22      Based Upon LMP    GA: 36.0     Number of Fetuses: 1    Currently Employed: No    Patient's last menstrual period was 2021 (exact date).  Estimated Date of Delivery: 22     Gender(s) & Name(s): Judson- Norm  Currently Breastfeeding: no  Baby active/feeling fetal movement: Yes  How are you presently feeling: Doing very well, blood pressures have been awesome and sugars have been in better control with the current insulin doses.    New Diagnosis (Also update in HX) [] Hyperemesis [x] HTN (Type: [x] Chronic [] Gestational) [] Pre-eclampsia [x] GDM  [] PTL [] Anxiety [] Depression [x] Anemia [] STI [] Other  What questions can I help to answer such as questions related to your care experience, your pregnancy, plans for delivery, any concerns, etc.: none at this time    Delivery Plans:  Method:  [] Vaginal  [x]  [] TOLAC   Anesthesia: [] None [] Epidural [] Spinal [x] Other :    Special Considerations: [] Birth Plan []  [] Birthing Ball [] Peanut Ball [] Other:      Support Person(s): Jhon  Sahil- S/O  Post-Delivery Plans:   Pediatric Provider Chosen: Yes, describe: Nicol Kelly  Adoption: no Circumcision for Male Baby: n/a- baby girl  Feeding Intentions: [x] Breast Milk [] Formula [] Breast Milk and Formula  Own a Breast Pump: [] No [] Manual [x] Electric (Type/Brand:                   )  Birth Control: [x] Undecided [] Tubal Ligation (Discussed w/ Provider and Consent Signed: [] Yes [] No  [] Other Planned Method of Birth Control    Immunizations:  Influenza: [] Not Yet [x] Refused [] Yes        Tdap: [] Not Yet [] Refused [] Yes  Covid [x] Not Yet [] Refused [] Yes    Review for Changes in Social History  Social History:  Smoking Status: Never a smoker.  # Packs/Day  Passive Exposure: no  Counseling Given: no  Smokeless Tobacco: Never a smoker.  Alcohol Use: No   Drinks/wk: 0   Substance Use History: No  Substances Used & Use/Wk: 0    [facility] Domestic Violence/Abuse Screening    Within the last year, have you been:  Humiliated or emotionally abused in other ways by your partner or ex-partner no  Afraid of your partner or ex-partner no  Raped or forced to have any kind of sexual activity by your partner or ex-partner no  Kicked, hit, slapped, or otherwise physically hurt by your partner or ex-partner no  Feels Unsafe at home or work/school: no  Feels Threatened by Someone no  Does anyone try to keep you from having contact with others/doing things outside your home: no  History of physical, mental, emotional, financial abuse/neglect: no    [place]  Depression Scale    Over the last 7 days:  I have been able to laugh and see the funny side of things: 0= As much as I always could  I have looked forward with enjoyment to things: 0= As much as I ever did  I have blamed myself unnecessarily when things went wron= Not very often  I have been anxious or worried for no good reason: 0= No, not at all  I have felt scared or panicky for no good reason: 0= No, never  Things have been getting on  top of me: 1= No, most of the time I have coped quiet well  I have been so unhappy that I have had difficulty sleepin= No, not at all  I have felt sad or miserable: 1= Not very often  I have been so unhappy that I have been cryin= No, never  The thought of harming myself has occurred to me: 0= Never  Total Score: 3    Spiritual, Cultural, Moravian, Value Awareness  Any Spiritual, Cultural, or Moravian Beliefs/Practices/Values that we need to be mindful of throughout your maternity experience: yes- Pentecostal.  Supplies ready for baby: [x] Car Seat [] Crib [x] Clothing [x] Diapers [x] Feeding Supplies  Resource/Environmental Concerns: None    Disability/Function  Disabilities (hearing, seeing, concentrating, communicating, comprehension, performing activities of daily living): none  Accomodations/Assistive Devices Utililzed (e.g. hearing aids, sign language, braile, service animal, /aide, etc.): none  Equipment Presently Utilized at Home: [] Breast Pump [x] Glucometer [x] Blood Pressure Cuff [x] Other:     Preparing for Labor and Baby Education    Completed Childbirth Education Classes: [x] No/Declined [] Yes [] Requests more information  Lactation Education Classes: [x] No/Declined [] Yes [] Requests more information  [] No/Declined [] Actively Participating [] Requests more information [] Other    Topic Educational Information Comments   Fetal Movement [] Provided   []Acknowledged [] Refused    Choosing a Pediatrician [] Provided   [x]Acknowledged [] Refused    Community Resources [] Provided   [x]Acknowledged [] Refused    Hospital Education Programs [] Provided   [x]Acknowledged [] Refused    [facility] Maternal-Child Department [x] Provided   []Acknowledged [] Refused Discussed Insulin Dependent policy for NICU admission for infant post delivery. Questions addressed and answered at this time.   Signs or Symptoms to Report [x] Provided   []Acknowledged [] Refused    Other: [] Provided    []Acknowledged [] Refused    Other:  [] Provided   []Acknowledged [] Refused    Comments:  [] Provided   []Acknowledged [] Refused      Do you have the [place]?  [x] YES   [] NO     MyChart: [x] YES   [] NO     Specific Resources Provided and Method: Other none needed at this time.   Sent via: [employer]    Intake Summary  [x] Periodic Check completed, will follow-up with patient: In hospital postpartum after C Section.  [] Discussed community resources and information provided or assisted with appointments (see notes)  [] Other, including any provider notifications (see notes)  [] Declines further [employer] participation (see notes)  Clari Peterson, RN   Maternal Nurse Navigator

## 2022-01-06 ENCOUNTER — LAB (OUTPATIENT)
Dept: LAB | Facility: HOSPITAL | Age: 37
End: 2022-01-06

## 2022-01-06 ENCOUNTER — ROUTINE PRENATAL (OUTPATIENT)
Dept: OBSTETRICS AND GYNECOLOGY | Facility: CLINIC | Age: 37
End: 2022-01-06

## 2022-01-06 VITALS — SYSTOLIC BLOOD PRESSURE: 130 MMHG | WEIGHT: 259.2 LBS | BODY MASS INDEX: 43.13 KG/M2 | DIASTOLIC BLOOD PRESSURE: 78 MMHG

## 2022-01-06 DIAGNOSIS — O26.893 RH NEGATIVE STATUS DURING PREGNANCY IN THIRD TRIMESTER: ICD-10-CM

## 2022-01-06 DIAGNOSIS — O99.613 CONSTIPATION DURING PREGNANCY IN THIRD TRIMESTER: ICD-10-CM

## 2022-01-06 DIAGNOSIS — O10.913 MATERNAL CHRONIC HYPERTENSION IN THIRD TRIMESTER: ICD-10-CM

## 2022-01-06 DIAGNOSIS — Z92.89 HISTORY OF TRANSFUSION OF PACKED RBC: ICD-10-CM

## 2022-01-06 DIAGNOSIS — D50.9 IRON DEFICIENCY ANEMIA DURING PREGNANCY: ICD-10-CM

## 2022-01-06 DIAGNOSIS — Z67.91 RH NEGATIVE STATUS DURING PREGNANCY IN THIRD TRIMESTER: ICD-10-CM

## 2022-01-06 DIAGNOSIS — O99.019 IRON DEFICIENCY ANEMIA DURING PREGNANCY: ICD-10-CM

## 2022-01-06 DIAGNOSIS — O99.891 SHORTNESS OF BREATH DURING PREGNANCY: ICD-10-CM

## 2022-01-06 DIAGNOSIS — K59.00 CONSTIPATION DURING PREGNANCY IN THIRD TRIMESTER: ICD-10-CM

## 2022-01-06 DIAGNOSIS — O24.113 PREGNANCY WITH TYPE 2 DIABETES MELLITUS IN THIRD TRIMESTER: Primary | ICD-10-CM

## 2022-01-06 DIAGNOSIS — Z98.891 HISTORY OF CESAREAN SECTION: ICD-10-CM

## 2022-01-06 DIAGNOSIS — R06.02 SHORTNESS OF BREATH DURING PREGNANCY: ICD-10-CM

## 2022-01-06 DIAGNOSIS — O09.523 HIGH RISK PREGNANCY, MULTIGRAVIDA OF ADVANCED MATERNAL AGE IN THIRD TRIMESTER: ICD-10-CM

## 2022-01-06 DIAGNOSIS — Z3A.36 36 WEEKS GESTATION OF PREGNANCY: ICD-10-CM

## 2022-01-06 LAB
DEPRECATED RDW RBC AUTO: 49 FL (ref 37–54)
ERYTHROCYTE [DISTWIDTH] IN BLOOD BY AUTOMATED COUNT: 15.9 % (ref 12.3–15.4)
HCT VFR BLD AUTO: 32.5 % (ref 34–46.6)
HGB BLD-MCNC: 10.5 G/DL (ref 12–15.9)
MCH RBC QN AUTO: 27.6 PG (ref 26.6–33)
MCHC RBC AUTO-ENTMCNC: 32.3 G/DL (ref 31.5–35.7)
MCV RBC AUTO: 85.5 FL (ref 79–97)
PLATELET # BLD AUTO: 520 10*3/MM3 (ref 140–450)
PMV BLD AUTO: 9.1 FL (ref 6–12)
RBC # BLD AUTO: 3.8 10*6/MM3 (ref 3.77–5.28)
WBC NRBC COR # BLD: 13.49 10*3/MM3 (ref 3.4–10.8)

## 2022-01-06 PROCEDURE — 85027 COMPLETE CBC AUTOMATED: CPT

## 2022-01-06 PROCEDURE — 36415 COLL VENOUS BLD VENIPUNCTURE: CPT

## 2022-01-06 PROCEDURE — 99213 OFFICE O/P EST LOW 20 MIN: CPT | Performed by: OBSTETRICS & GYNECOLOGY

## 2022-01-06 NOTE — PROGRESS NOTES
CC: Prenatal visit    Donna Linder is a 36 y.o.  at 36w1d.  Doing well.  Denies contractions, LOF, or VB.  Reports good FM.    /78   Wt 118 kg (259 lb 3.2 oz)   LMP 2021 (Exact Date)   BMI 43.13 kg/m²     Fundal Height (cm): 37 cm  Fetal Heart Rate: 150     Problems (from 21 to present)     Problem Noted Resolved    History of  section 10/16/2021 by Bernardo Douglas MD No    Priority:  High      Overview Signed 10/16/2021 12:09 AM by Bernardo Douglas MD     Wants to have repeat  section after Tolac counseling         History of transfusion of packed RBC 10/16/2021 by Bernardo Douglas MD No    Priority:  High      Overview Signed 10/16/2021 12:13 AM by Bernardo Douglas MD     With  section.  History mild anemia.  Start iron         Shortness of breath during pregnancy 2021 by Bernardo Douglas MD No    Priority:  High      Overview Signed 2021 10:52 AM by Bernardo Douglas MD     Patient is short of breath during second trimester pregnancy but says it dates back to when she had Covid in January.  Is on albuterol inhaler         Constipation in pregnancy 1/3/2022 by Moni Hubbard DO No    Overview Signed 1/3/2022  7:27 PM by Moni Hubbard DO     On colace, miralax bid, stimulant bid which helps some  Po iron now qd instead of bid         Iron deficiency anemia during pregnancy 1/3/2022 by Moni Hubbard DO No    Overview Signed 1/3/2022  7:28 PM by Moni Hubbard DO     Seeing heme onc, receiving IV iron, po iron qd, and po B12         Maternal chronic hypertension in third trimester 2021 by Kaitlin Whyte, KEE No    Overview Signed 1/3/2022  7:23 PM by Moni Hubbard DO     Labetalol 100 mg bid         High risk pregnancy, multigravida of advanced maternal age in third trimester 2021 by Kaitlin Whyte, APRN No    Rh negative status during pregnancy in third trimester 2021 by Kaitlin Whyte, APRN No    Overview Signed 1/3/2022   7:24 PM by Moni Hubbard, DO     S/p Rhogam         Pregnancy with type 2 diabetes mellitus in third trimester 2021 by Kaitlin Whyte APRN No    Overview Addendum 1/3/2022  7:24 PM by Moni Hubbard, DO     Dx at 5w6d on   EarlyGDM vs. Type 2 diabetes?  - A1c- 5.8    : Elevated fasting glucose. Metformin increased to 1000mg at bedtime and 500mg in the morning per Dr. Cummins    : Fasting sugars in the low 100s, improved from 120s. However,  Fasting never <95. 1 hr PP, 140 or less. Will increased to 1500mg at bedtime and 500mg in the morning.    1/3/22: seeing Dr. Ga, did not bring log, reports values mostly WNL on Lantus 22 qhs, Humalog tid with meals SSI with carb count usu 14-20 units    Twice weekly antepartum surveillance, normal fetal echo             Previous Version    Pain of round ligament affecting pregnancy, antepartum 2021 by Argenis Winston, APRN 2021 by Argenis Winston APRN    Overview Signed 2021 11:57 AM by Argenis Winston APRN     Cramping as of 13 weeks; pelvic belt given. Discussed physical therapy referral, pt declines at this time.          Nausea and vomiting during pregnancy prior to 22 weeks gestation 2021 by Argenis Winston APRN 2021 by Lauren Rouse APRN          A/P: Donna Linder is a 36 y.o.  at 36w1d.  - RTC in 4 days  - Reviewed COVID-19 visitation policy  - Reviewed COVID-19 precautions     Diagnosis Plan   1. Pregnancy with type 2 diabetes mellitus in third trimester  US patient says blood sugars in the morning fasting are in the mid 90s to her postprandials 120s Ob Follow Up Transabdominal Approach   2. Constipation during pregnancy in third trimester     3. Iron deficiency anemia during pregnancy  CBC (No Diff) had iron infusions by heme-onc   4. History of  section   is certain she wants to have a repeat  section after extensive TOLAC counseling done   5. History of  transfusion of packed RBC     6. Shortness of breath during pregnancy     7. Maternal chronic hypertension in third trimester   denies headaches visual changes epigastric pain DTRs 2+/   8. High risk pregnancy, multigravida of advanced maternal age in third trimester     9. Rh negative status during pregnancy in third trimester     10. 36 weeks gestation of pregnancy       Bernardo Douglas MD  1/6/2022  17:45 CST

## 2022-01-06 NOTE — H&P
Obstetric History and Physical    Chief Complaint   Patient presents with   •  I am sure one I had another  section           Patient is a 36 y.o. female  currently at 36w1d, who presents with request for repeat  section after considering risks benefits alternatives. Patient with a history of  section. I have done extensive [place] counseling on numerous occasion gone over the risk benefits alternatives of a repeat  section versus a trial of labor is certain she wants a repeat  section. Both the short-term and long-term risks of  section are reviewed at length.  Short-term risks of bleeding, infection, problems with wound healing, damage to bowel, bladder or ureter.  Small, but real risk of maternal mortality is reviewed and understood.    Long-term risks include in future pregnancies accreta abruption, uterine rupture and stillbirth, among others might be ameliorated by a .  The patient and her family have been given opportunity to ask questions and these questions have been answered to their satisfaction.    Pregnancy has also been complicated by type 2 diabetes with initially difficult to control but later improved has been seen by endocrinology. Her pregnancy is also being complicated by chronic hypertension with generally good blood pressures. In light of this have discussed timing of delivery and after reviewing the risk benefits alternatives for baby have settled between 38 and 39 weeks and we will plan for 38-1/7-week.    Pregnancy also complicated by history of anemia and transfusion with last  section has been seen by hematology oncology for optimization of hematologic status and has received infusion of iron  .    Her prenatal care is [facility]     Obstetric History   # 1 - Date: 06, Sex: Female, Weight: 2892 g (6 lb 6 oz), GA: 38w0d, Delivery: , Low Transverse, Apgar1: None, Apgar5: None, Living: Living,  "Birth Comments: Patient says her placenta \"tore away from uterus\". had blood transfusion after delivery. pt says her blood pressure was high at end of pregnancy. (we do not have records available)    # 2 - Date: None, Sex: None, Weight: None, GA: None, Delivery: None, Apgar1: None, Apgar5: None, Living: None, Birth Comments: None       The following portions of the patients history were reviewed and updated as appropriate: current medications, allergies, past medical history, past surgical history, past family history, past social history and problem list .       Prenatal Information:  Prenatal Results     POC Urine Glucose/Protein     Test Value Reference Range Date Time    Urine Glucose        Urine Protein              Initial Prenatal Labs     Test Value Reference Range Date Time    Hemoglobin  10.5 g/dL 12.0 - 15.9 01/06/22 0953       9.9 g/dL 12.0 - 15.9 12/23/21 1209       9.7 g/dL 12.0 - 15.9 12/16/21 1512       10.1 g/dL 12.0 - 15.9 12/02/21 0952       9.4 g/dL 12.0 - 15.9 11/18/21 1200       9.7 g/dL 12.0 - 15.9 11/04/21 1113       10.1 g/dL 12.0 - 15.9 10/15/21 0918       10.7 g/dL 12.0 - 15.9 08/25/21 1048       10.1 g/dL 12.0 - 15.9 07/04/21 0153       11.0 g/dL 12.0 - 15.9 06/08/21 1117    Hematocrit  32.5 % 34.0 - 46.6 01/06/22 0953       30.6 % 34.0 - 46.6 12/23/21 1209       29.9 % 34.0 - 46.6 12/16/21 1512       31.8 % 34.0 - 46.6 12/02/21 0952       29.2 % 34.0 - 46.6 11/18/21 1200       29.6 % 34.0 - 46.6 11/04/21 1113       31.3 % 34.0 - 46.6 10/15/21 0918       33.1 % 34.0 - 46.6 08/25/21 1048       31.4 % 34.0 - 46.6 07/04/21 0153       34.3 % 34.0 - 46.6 06/08/21 1117    Platelets  520 10*3/mm3 140 - 450 01/06/22 0953       477 10*3/mm3 140 - 450 12/23/21 1209       483 10*3/mm3 140 - 450 12/16/21 1512       457 10*3/mm3 140 - 450 12/02/21 0952       466 10*3/mm3 140 - 450 11/18/21 1200       483 10*3/mm3 140 - 450 11/04/21 1113       462 10*3/mm3 140 - 450 10/15/21 0918       457 10*3/mm3 " 140 - 450 08/25/21 1048       433 10*3/mm3 140 - 450 07/04/21 0153       415 10*3/mm3 140 - 450 06/08/21 1117    Rubella IgG  2.83 index Immune >0.99 06/08/21 1117    Hepatitis B SAg  Non-Reactive  Non-Reactive 06/08/21 1117    Hepatitis C Ab  Non-Reactive  Non-Reactive 06/08/21 1117    RPR  Non-Reactive  Non-Reactive 06/08/21 1117    ABO  O   06/08/21 1117    Rh  Negative   06/08/21 1117    Antibody Screen  Negative   10/15/21 0918       Negative   06/08/21 1117    HIV  Non-Reactive  Non-Reactive 06/08/21 1117    Urine Culture  >100,000 CFU/mL Mixed Pamella Isolated   06/08/21 1117    Gonorrhea  Negative  Negative 06/08/21 1117    Chlamydia  Negative  Negative 06/08/21 1117    TSH  1.390 uIU/mL 0.270 - 4.200 09/22/21 1105    HgB A1c   5.00 % 4.80 - 5.60 08/25/21 1048       5.80 % 4.80 - 5.60 06/08/21 1117          2nd and 3rd Trimester     Test Value Reference Range Date Time    Hemoglobin (repeated)  10.5 g/dL 12.0 - 15.9 01/06/22 0953       9.9 g/dL 12.0 - 15.9 12/23/21 1209       9.7 g/dL 12.0 - 15.9 12/16/21 1512       10.1 g/dL 12.0 - 15.9 12/02/21 0952       9.4 g/dL 12.0 - 15.9 11/18/21 1200       9.7 g/dL 12.0 - 15.9 11/04/21 1113       10.1 g/dL 12.0 - 15.9 10/15/21 0918    Hematocrit (repeated)  32.5 % 34.0 - 46.6 01/06/22 0953       30.6 % 34.0 - 46.6 12/23/21 1209       29.9 % 34.0 - 46.6 12/16/21 1512       31.8 % 34.0 - 46.6 12/02/21 0952       29.2 % 34.0 - 46.6 11/18/21 1200       29.6 % 34.0 - 46.6 11/04/21 1113       31.3 % 34.0 - 46.6 10/15/21 0918    Platelets   520 10*3/mm3 140 - 450 01/06/22 0953       477 10*3/mm3 140 - 450 12/23/21 1209       483 10*3/mm3 140 - 450 12/16/21 1512       457 10*3/mm3 140 - 450 12/02/21 0952       466 10*3/mm3 140 - 450 11/18/21 1200       483 10*3/mm3 140 - 450 11/04/21 1113       462 10*3/mm3 140 - 450 10/15/21 0918       457 10*3/mm3 140 - 450 08/25/21 1048       433 10*3/mm3 140 - 450 07/04/21 0153       415 10*3/mm3 140 - 450 06/08/21 1117    GCT  181 mg/dL  60 - 140 06/08/21 1117    Antibody Screen (repeated)  Negative   10/15/21 0918    GTT Fasting        GTT 1 Hr        GTT 2 Hr        GTT 3 Hr        Group B Strep              Drug Screening     Test Value Reference Range Date Time    Amphetamine Screen  Negative  Negative 06/08/21 1117    Barbiturate Screen  Negative  Negative 06/08/21 1117    Benzodiazepine Screen  Negative  Negative 06/08/21 1117    Methadone Screen  Negative  Negative 06/08/21 1117    Phencyclidine Screen  Negative  Negative 06/08/21 1117    Opiates Screen  Negative  Negative 06/08/21 1117    THC Screen  Negative  Negative 06/08/21 1117    Cocaine Screen  Negative  Negative 06/08/21 1117    Propoxyphene Screen  Negative  Negative 06/08/21 1117    Buprenorphine Screen  Negative  Negative 06/08/21 1117    Methamphetamine Screen  Negative  Negative 06/08/21 1117    Oxycodone Screen  Negative  Negative 06/08/21 1117    Tricyclic Antidepressants Screen  Negative  Negative 06/08/21 1117          Other (Risk screening)     Test Value Reference Range Date Time    Varicella IgG        Parvovirus IgG        CMV IgG        Cystic Fibrosis        Hemoglobin electrophoresis        NIPT        MSAFP-4        AFP (for NTD only)              Legend    ^: Historical                      External Prenatal Results     Pregnancy Outside Results - Transcribed From Office Records - See Scanned Records For Details     Test Value Date Time    ABO  O  06/08/21 1117    Rh  Negative  06/08/21 1117    Antibody Screen  Negative  10/15/21 0918       Negative  06/08/21 1117    Varicella IgG       Rubella  2.83 index 06/08/21 1117    Hgb  10.5 g/dL 01/06/22 0953       9.9 g/dL 12/23/21 1209       9.7 g/dL 12/16/21 1512       10.1 g/dL 12/02/21 0952       9.4 g/dL 11/18/21 1200       9.7 g/dL 11/04/21 1113       10.1 g/dL 10/15/21 0918       10.7 g/dL 08/25/21 1048       10.1 g/dL 07/04/21 0153       11.0 g/dL 06/08/21 1117    Hct  32.5 % 01/06/22 0953       30.6 % 12/23/21  "1209       29.9 % 21 1512       31.8 % 21 0952       29.2 % 21 1200       29.6 % 21 1113       31.3 % 10/15/21 0918       33.1 % 21 1048       31.4 % 21 0153       34.3 % 21 1117    Glucose Fasting GTT       Glucose Tolerance Test 1 hour       Glucose Tolerance Test 3 hour       Gonorrhea (discrete)  Negative  21 1117    Chlamydia (discrete)  Negative  21 1117    RPR  Non-Reactive  21 1117    VDRL       Syphilis Antibody       HBsAg  Non-Reactive  21 111    Herpes Simplex Virus PCR       Herpes Simplex VIrus Culture       HIV  Non-Reactive  21 111    Hep C RNA Quant PCR       Hep C Antibody  Non-Reactive  21 111    AFP       Group B Strep       GBS Susceptibility to Clindamycin       GBS Susceptibility to Erythromycin       Fetal Fibronectin       Genetic Testing, Maternal Blood             Drug Screening     Test Value Date Time    Urine Drug Screen       Amphetamine Screen  Negative  21 1117    Barbiturate Screen  Negative  21 1117    Benzodiazepine Screen  Negative  21 1117    Methadone Screen  Negative  21 1117    Phencyclidine Screen  Negative  21 1117    Opiates Screen  Negative  21 1117    THC Screen  Negative  21 1117    Cocaine Screen       Propoxyphene Screen  Negative  21 1117    Buprenorphine Screen  Negative  21 1117    Methamphetamine Screen       Oxycodone Screen  Negative  21 1117    Tricyclic Antidepressants Screen  Negative  21 1117          Legend    ^: Historical                         Past OB History:     OB History    Para Term  AB Living   2 1 1 0 0 1   SAB IAB Ectopic Molar Multiple Live Births   0 0 0 0 0 1      # Outcome Date GA Lbr Dereck/2nd Weight Sex Delivery Anes PTL Lv   2 Current            1 Term 06 38w0d  2892 g (6 lb 6 oz) F CS-LTranv EPI, Spinal  HIEU      Birth Comments: Patient says her placenta \"tore away from " "uterus\". had blood transfusion after delivery. pt says her blood pressure was high at end of pregnancy. (we do not have records available)        ALLERGIES:     Allergies   Allergen Reactions   • Penicillins Other (See Comments)     unknown        Home Medications:     Prior to Admission medications    Medication Sig Start Date End Date Taking? Authorizing Provider   albuterol (ProAir RespiClick) 108 (90 Base) MCG/ACT inhaler Inhale 2 puffs Every 4 (Four) Hours As Needed for Wheezing. 9/28/21  Yes Yumiko Merlos DO   albuterol (PROVENTIL) (2.5 MG/3ML) 0.083% nebulizer solution Take 3 mL by nebulization As Needed. 11/13/21  Yes Drew Medina MD   Alcohol Swabs 70 % pads 1 each 4 (Four) Times a Day. 6/8/21  Yes Kaitlin Whyte APRN   cefdinir (OMNICEF) 300 MG capsule Take 600 mg by mouth Daily. 11/13/21  Yes Drew Medina MD   Continuous Blood Gluc  (Dexcom G6 ) device 1 each Continuous. USE AS DIRECTED FOR CONTINUOUS GLUCOSE MONITORING. 10/12/21  Yes Vitaly Silva MD   Continuous Blood Gluc Sensor (Dexcom G6 Sensor) Every 10 (Ten) Days. Change sensor every 10 days. 10/14/21  Yes Vitaly Silva MD   Continuous Blood Gluc Transmit (Dexcom G6 Transmitter) misc 1 each Every 3 (Three) Months. 10/12/21  Yes Vitaly Silva MD   docusate sodium (Colace) 100 MG capsule Take 1 capsule by mouth 2 (Two) Times a Day. 10/15/21  Yes Bernardo Douglas MD   ferrous sulfate 325 (65 FE) MG tablet Take 1 tablet by mouth Daily With Breakfast. 10/16/21  Yes Bernardo Douglas MD   fluticasone (FLOVENT HFA) 220 MCG/ACT inhaler Inhale 2 puffs 2 (Two) Times a Day. 9/28/21  Yes Yumiko Merlos DO   glucose blood test strip Test blood sugar fasting and 1 hour after each meal (QID). 6/8/21  Yes Kaitlin Whyte APRN   glucose monitor monitoring kit 1 each Take As Directed. 6/8/21  Yes Cook, Kaitlin W, APRN   Insulin Glargine (Lantus SoloStar) 100 UNIT/ML injection pen INJECT 5 " UNITS UNDER THE SKIN EVERY NIGHT AT BEDTIME  Patient taking differently: 22 Units. INJECT 22 UNITS UNDER THE SKIN EVERY NIGHT AT BEDTIME 10/27/21  Yes Vitaly Silva MD   insulin glargine (LANTUS, SEMGLEE) 100 UNIT/ML injection Inject  under the skin into the appropriate area as directed.   Yes Provider, MD Drew   Insulin Lispro, 1 Unit Dial, (HumaLOG KwikPen) 100 UNIT/ML solution pen-injector Up to 20 units with meals, E11.65 10/7/21  Yes Vitaly Silva MD   Insulin Pen Needle (Pen Needles) 32G X 4 MM misc 1 each 4 (Four) Times a Day. Use 4 x daily, Dx code E11.65 10/7/21  Yes Vitaly Silva MD   labetalol (NORMODYNE) 100 MG tablet Take 1 tablet by mouth 2 (Two) Times a Day. 12/16/21  Yes Lauren Rouse APRN   Lancets (onetouch ultrasoft) lancets Test blood sugar fasting and 1 hour after each meal (QID) 6/8/21  Yes Kaitlin Whyte APRN   ondansetron (ZOFRAN) 4 MG tablet TAKE 1 TABLET BY MOUTH DAILY AS NEEDED FOR NAUSEA OR VOMITING 12/30/21  Yes Argenis Winston APRN   polyethylene glycol (MiraLax) 17 g packet Take 17 g by mouth Daily. 10/15/21  Yes Bernardo Douglas MD   Prenatal Vit-Fe Fumarate-FA (Prenatal Vitamin) 27-0.8 MG tablet Take 1 tablet by mouth Daily. 6/9/21  Yes Kaitlin Whyte APRN   ProAir  (90 Base) MCG/ACT inhaler INHALE 2 PUFFS BY MOUTH EVERY 8 HOURS AS NEEDED 5/8/21  Yes Provider, MD Derw   sennosides-docusate (PERICOLACE) 8.6-50 MG per tablet Take 1 tablet by mouth 2 (Two) Times a Day As Needed for Constipation. 12/21/21  Yes Bernardo Douglas MD   vitamin B-12 (CYANOCOBALAMIN) 1000 MCG tablet Take 1 tablet by mouth Daily. 12/24/21  Yes Saul Savage MD   vitamin B-6 (PYRIDOXINE) 25 MG tablet Take 1 tablet by mouth 2 (Two) Times a Day. 10/28/21  Yes Bernardo Douglas MD       Past Medical History: Past Medical History:   Diagnosis Date   • Anemia    • Depression    • Diverticulitis    • Diverticulitis    • Endometriosis    •  Fibromyalgia    • Gestational diabetes mellitus (GDM) in first trimester 2021   • History of transfusion    • Hypertension    • Kidney stones    • Ovarian cyst    • PCOS (polycystic ovarian syndrome)    • Peptic ulcer disease    • Polycystic ovary syndrome    • Urogenital trichomoniasis    • Uterine fibroids affecting pregnancy in second trimester 2021   • Varicella       Past Surgical History Past Surgical History:   Procedure Laterality Date   • APPENDECTOMY     • BREAST EXCISIONAL BIOPSY Left    •  SECTION     • WRIST ARTHROSCOPY W/ TRIANGULAR FIBROCARTILAGE REPAIR Left       Family History: Family History   Problem Relation Age of Onset   • Hypertension Mother    • Diabetes Mother    • Hypertension Father    • Diabetes Father    • Kidney cancer Father    • Diabetes Sister    • Strabismus Daughter    • No Known Problems Maternal Grandfather    • Diabetes Paternal Grandmother    • Colon cancer Paternal Grandfather    • No Known Problems Sister       Social History:  reports that she has never smoked. She has never used smokeless tobacco.   reports previous alcohol use.   reports no history of drug use.        Review of Systems                                                                                                                      Constitutional: Denies night sweats    HENT: No hearing changes, denies ear pain    Eye: No eye pain; no foreign body in eye    Pulmonary: No hemoptysis    Cardiovascular: No claudication    GI: No hematemesis    Musculoskeletal: No arthralgias, no joint swelling    Endocrine: No polydipsia or polyuria    Hematologic: Denies any free bleeding    Psychiatric: Denies any delusions      Objective     Documented Vitals    22 0937   BP: 130/78   Weight: 118 kg (259 lb 3.2 oz)          OBGyn Exam  Constitutional: Appears to be in no acute distress; Eyes: sclera normal; Endocrine system: thyroid palpate is normal; Pulmonary system: lungs clear; Cardiovascular  system: heart regular rate and rhythm; Gastrointestinal system: abdomen soft nontender, active bowel sounds; Urologic system: CVA negative; Psychiatric: appropriate insight; Neurologic: gait within normal limits        Last Labs  Lab Results   Component Value Date    WBC 13.49 (H) 2022    RBC 3.80 2022    HGB 10.5 (L) 2022    HCT 32.5 (L) 2022    MCV 85.5 2022    MCH 27.6 2022    MCHC 32.3 2022    RDW 15.9 (H) 2022    RDWSD 49.0 2022    MPV 9.1 2022     (H) 2022        Lab Results   Component Value Date    GLUCOSE 95 2021    BUN 10 2021    CREATININE 0.78 2021     2021    K 3.6 2021     2021    CO2 23.0 2021    CALCIUM 9.9 2021    PROTEINTOT 7.3 2021    ALBUMIN 4.20 2021    ALT 16 2021    AST 12 2021    ALKPHOS 69 2021    BILITOT 0.3 2021    EGFRIFNONA 83 2021    GLOB 3.1 2021    AGRATIO 1.4 2021    BCR 12.8 2021    ANIONGAP 12.0 2021       Lab Results   Component Value Date    HCGQUAL Negative 2018         Assessment/Plan:  1. 36 y.o. [id number]. Prior  section pregnancy complicated by type 2 diabetes chronic hypertension with morbid obesity after reviewing risk benefits alternatives we will plan for repeat  sections she understands her how her medical problems puts her at increased risk of complications    Donna Linder and I have discussed pain goals for this hospitalization after reviewing her current clinical condition, medical history and prior pain experiences.  The goal is to keep her pain level 3.  To help achieve this, I plan to multimodal pain management.       This document has been electronically signed by Bernardo Douglas MD on 2022 17:52 CST\    Please note that portions of this note were completed with a voice recognition program.

## 2022-01-10 ENCOUNTER — OFFICE VISIT (OUTPATIENT)
Dept: ENDOCRINOLOGY | Facility: CLINIC | Age: 37
End: 2022-01-10

## 2022-01-10 ENCOUNTER — ROUTINE PRENATAL (OUTPATIENT)
Dept: OBSTETRICS AND GYNECOLOGY | Facility: CLINIC | Age: 37
End: 2022-01-10

## 2022-01-10 VITALS — WEIGHT: 259 LBS | DIASTOLIC BLOOD PRESSURE: 70 MMHG | SYSTOLIC BLOOD PRESSURE: 118 MMHG | BODY MASS INDEX: 43.1 KG/M2

## 2022-01-10 VITALS
SYSTOLIC BLOOD PRESSURE: 120 MMHG | WEIGHT: 261 LBS | HEART RATE: 92 BPM | BODY MASS INDEX: 43.49 KG/M2 | OXYGEN SATURATION: 97 % | DIASTOLIC BLOOD PRESSURE: 70 MMHG | HEIGHT: 65 IN

## 2022-01-10 DIAGNOSIS — Z98.891 HISTORY OF CESAREAN SECTION: ICD-10-CM

## 2022-01-10 DIAGNOSIS — Z3A.36 36 WEEKS GESTATION OF PREGNANCY: Primary | ICD-10-CM

## 2022-01-10 DIAGNOSIS — O24.112 PREGNANCY WITH TYPE 2 DIABETES MELLITUS IN SECOND TRIMESTER: Primary | ICD-10-CM

## 2022-01-10 DIAGNOSIS — K59.00 CONSTIPATION DURING PREGNANCY IN THIRD TRIMESTER: ICD-10-CM

## 2022-01-10 DIAGNOSIS — D50.9 IRON DEFICIENCY ANEMIA DURING PREGNANCY: ICD-10-CM

## 2022-01-10 DIAGNOSIS — Z36.9 ENCOUNTER FOR ANTENATAL SCREENING: ICD-10-CM

## 2022-01-10 DIAGNOSIS — O24.113 PREGNANCY WITH TYPE 2 DIABETES MELLITUS IN THIRD TRIMESTER: ICD-10-CM

## 2022-01-10 DIAGNOSIS — N76.0 ACUTE VAGINITIS: ICD-10-CM

## 2022-01-10 DIAGNOSIS — Z67.91 RH NEGATIVE STATUS DURING PREGNANCY IN THIRD TRIMESTER: ICD-10-CM

## 2022-01-10 DIAGNOSIS — R06.02 SHORTNESS OF BREATH DURING PREGNANCY: ICD-10-CM

## 2022-01-10 DIAGNOSIS — O10.913 MATERNAL CHRONIC HYPERTENSION IN THIRD TRIMESTER: ICD-10-CM

## 2022-01-10 DIAGNOSIS — Z92.89 HISTORY OF TRANSFUSION OF PACKED RBC: ICD-10-CM

## 2022-01-10 DIAGNOSIS — O99.891 SHORTNESS OF BREATH DURING PREGNANCY: ICD-10-CM

## 2022-01-10 DIAGNOSIS — O99.019 IRON DEFICIENCY ANEMIA DURING PREGNANCY: ICD-10-CM

## 2022-01-10 DIAGNOSIS — O09.523 HIGH RISK PREGNANCY, MULTIGRAVIDA OF ADVANCED MATERNAL AGE IN THIRD TRIMESTER: ICD-10-CM

## 2022-01-10 DIAGNOSIS — O99.613 CONSTIPATION DURING PREGNANCY IN THIRD TRIMESTER: ICD-10-CM

## 2022-01-10 DIAGNOSIS — O26.893 RH NEGATIVE STATUS DURING PREGNANCY IN THIRD TRIMESTER: ICD-10-CM

## 2022-01-10 LAB
CANDIDA ALBICANS: NEGATIVE
GARDNERELLA VAGINALIS: NEGATIVE
T VAGINALIS DNA VAG QL PROBE+SIG AMP: NEGATIVE

## 2022-01-10 PROCEDURE — 87660 TRICHOMONAS VAGIN DIR PROBE: CPT | Performed by: NURSE PRACTITIONER

## 2022-01-10 PROCEDURE — 87510 GARDNER VAG DNA DIR PROBE: CPT | Performed by: NURSE PRACTITIONER

## 2022-01-10 PROCEDURE — 87653 STREP B DNA AMP PROBE: CPT | Performed by: NURSE PRACTITIONER

## 2022-01-10 PROCEDURE — 99214 OFFICE O/P EST MOD 30 MIN: CPT | Performed by: NURSE PRACTITIONER

## 2022-01-10 PROCEDURE — 87186 SC STD MICRODIL/AGAR DIL: CPT | Performed by: NURSE PRACTITIONER

## 2022-01-10 PROCEDURE — 99214 OFFICE O/P EST MOD 30 MIN: CPT | Performed by: INTERNAL MEDICINE

## 2022-01-10 PROCEDURE — 87480 CANDIDA DNA DIR PROBE: CPT | Performed by: NURSE PRACTITIONER

## 2022-01-10 NOTE — PROGRESS NOTES
CC: Prenatal visit    Donna Linder is a 36 y.o.  at 36w5d.  Doing well.  No complaints.  Denies contractions, LOF, or VB.  Reports good FM.    /70   Wt 117 kg (259 lb)   LMP 2021 (Exact Date)   BMI 43.10 kg/m²   SVE: closed     Reviewed prelim US- BPP , JAYMIE 9.66 cm     Fetal Heart Rate: 151     Problems (from 21 to present)     Problem Noted Resolved    Constipation in pregnancy 1/3/2022 by Moni Hubbard DO No    Overview Signed 1/3/2022  7:27 PM by Moni Hubbard DO     On colace, miralax bid, stimulant bid which helps some  Po iron now qd instead of bid         Iron deficiency anemia during pregnancy 1/3/2022 by Moni Hubbard DO No    Overview Signed 1/3/2022  7:28 PM by Moni Hubbard DO     Seeing heme onc, receiving IV iron, po iron qd, and po B12         History of  section 10/16/2021 by Bernardo Douglas MD No    Overview Signed 10/16/2021 12:09 AM by Bernardo Douglas MD     Wants to have repeat  section after [place] counseling         History of transfusion of packed RBC 10/16/2021 by Bernardo Douglas MD No    Overview Signed 10/16/2021 12:13 AM by Bernardo Douglas MD     With  section.  History mild anemia.  Start iron         Shortness of breath during pregnancy 2021 by Bernardo Douglas MD No    Overview Signed 2021 10:52 AM by Bernardo Douglas MD     Patient is short of breath during second trimester pregnancy but says it dates back to when she had Covid in January.  Is on albuterol inhaler         Maternal chronic hypertension in third trimester 2021 by Kaitlin Whyte APRN No    Overview Signed 1/3/2022  7:23 PM by Moni Hubbard DO     Labetalol 100 mg bid         High risk pregnancy, multigravida of advanced maternal age in third trimester 2021 by Kaitlin Whyte, KEE No    Rh negative status during pregnancy in third trimester 2021 by Cook, Kaitlin W, APRN No    Overview Signed 1/3/2022  7:24 PM by Stevie  DO Moni     S/p Rhogam         Pregnancy with type 2 diabetes mellitus in third trimester 2021 by Kaitlin Whyte, KEE No    Overview Addendum 1/3/2022  7:24 PM by Moni Hubbard DO     Dx at 5w6d on   EarlyGDM vs. Type 2 diabetes?  - A1c- 5.8    : Elevated fasting glucose. Metformin increased to 1000mg at bedtime and 500mg in the morning per Dr. Cummins    : Fasting sugars in the low 100s, improved from 120s. However,  Fasting never <95. 1 hr PP, 140 or less. Will increased to 1500mg at bedtime and 500mg in the morning.    1/3/22: seeing Dr. Ga, did not bring log, reports values mostly WNL on Lantus 22 qhs, Humalog tid with meals SSI with carb count usu 14-20 units    Twice weekly antepartum surveillance, normal fetal echo             Previous Version    Pain of round ligament affecting pregnancy, antepartum 2021 by Argenis Winston APRN 2021 by Argenis Winston APRN    Overview Signed 2021 11:57 AM by Argenis Winston APRN     Cramping as of 13 weeks; pelvic belt given. Discussed physical therapy referral, pt declines at this time.          Nausea and vomiting during pregnancy prior to 22 weeks gestation 2021 by Argenis Winston APRN 2021 by Lauren Rouse APRN          A/P: Donna Linder is a 36 y.o.  at 36w5d.  - RTC on 2022 for US and OB appt      Diagnosis Plan   1. 36 weeks gestation of pregnancy     2. High risk pregnancy, multigravida of advanced maternal age in third trimester     3. Pregnancy with type 2 diabetes mellitus in third trimester     4. Maternal chronic hypertension in third trimester     5. Constipation during pregnancy in third trimester     6. Iron deficiency anemia during pregnancy     7. History of  section     8. History of transfusion of packed RBC     9. Shortness of breath during pregnancy     10. Rh negative status during pregnancy in third trimester     11. Encounter for   screening  Strep B Screen - Swab, Vagina   12. Acute vaginitis  Gardnerella vaginalis, Trichomonas vaginalis, Candida albicans, DNA - Swab, Vagina       KEE Jaimes  1/10/2022  09:58 CST

## 2022-01-10 NOTE — PROGRESS NOTES
"Chief Complaint   Patient presents with   • Diabetes     t2       History of Present Illness    36 y.o. female     Diabetes Type 2    Duration - noticed again during this pregnancy but preceding diabetes  Patient is 38 weeks pregnant    Complications -  none  Present Monitoring -      Fingersticks -  4 x daily    CGM -     Present Regimen -  Insulin   Carb Intake -   Low carb  Exercise -   None  Symptoms -   None   ==========================================  Physical Exam  /70   Pulse 92   Ht 165.1 cm (65\")   Wt 118 kg (261 lb)   LMP 04/28/2021 (Exact Date)   SpO2 97%   BMI 43.43 kg/m²   AOx3  No goiter, no carotid bruit  RRR  CTA  Gravid Uterus   No Edema     ==========================================    Laboratory Workup    Lab Results   Component Value Date    WBC 13.49 (H) 01/06/2022    HGB 10.5 (L) 01/06/2022    HCT 32.5 (L) 01/06/2022    MCV 85.5 01/06/2022     (H) 01/06/2022       Lab Results   Component Value Date    GLUCOSE 95 07/04/2021    BUN 10 07/04/2021    CREATININE 0.78 07/04/2021    EGFRIFNONA 83 07/04/2021    EGFRIFAFRI 85 03/19/2021    BCR 12.8 07/04/2021    K 3.6 07/04/2021    CO2 23.0 07/04/2021    CALCIUM 9.9 07/04/2021    ALBUMIN 4.20 06/08/2021    AST 12 06/08/2021    ALT 16 06/08/2021       Lab Results   Component Value Date    EGFRIFNONA 83 07/04/2021         ==========================================      ICD-10-CM ICD-9-CM   1. Pregnancy with type 2 diabetes mellitus in second trimester  O24.112 648.03     250.00       Diabetes Mellitus    --------------------------------------------------------------------------------------------------------------------------------------------    Glycemic Management   Lab Results   Component Value Date    HGBA1C 5.00 08/25/2021     Stop meformin , intolerant anyway       lantus 6 qhs , now at 24 - target fasting 70 to 95     18 the night before delivery then no more       Humalog , 1 unit per 10 grams plus 1 per 25 above 100 with 1 h " goal less than 140, 2 h goal less than 120    Sliding Scale               + plus Carb Scale  100-125 = 1 unit                      10g = 1 un         126-150 = 2 un                        20g = 2 un  151-175 = 3 un                        30g = 3 un  176-200 = 4 un                        40g = 4 un  201-225 = 5 un                        50g = 5 un  226-250 = 6 un                        60g = 6 un    After deliver , only the sliding scale   Doing  dexcom     Post pregnancy , may start trulicity       No orders of the defined types were placed in this encounter.               This document has been electronically signed by Vitaly Ga MD on January 10, 2022 15:56 CST

## 2022-01-10 NOTE — PATIENT INSTRUCTIONS
Stop meformin , intolerant anyway       lantus 6 qhs , now at 24 - target fasting 70 to 95     18 the night before delivery then no more       Humalog , 1 unit per 10 grams plus 1 per 25 above 100 with 1 h goal less than 140, 2 h goal less than 120    Sliding Scale               + plus Carb Scale  100-125 = 1 unit                      10g = 1 un         126-150 = 2 un                        20g = 2 un  151-175 = 3 un                        30g = 3 un  176-200 = 4 un                        40g = 4 un  201-225 = 5 un                        50g = 5 un  226-250 = 6 un                        60g = 6 un    After deliver , only the sliding scale   Doing  dexcom     Post pregnancy , may start trulicity

## 2022-01-12 LAB — GROUP B STREP, DNA: POSITIVE

## 2022-01-13 ENCOUNTER — ROUTINE PRENATAL (OUTPATIENT)
Dept: OBSTETRICS AND GYNECOLOGY | Facility: CLINIC | Age: 37
End: 2022-01-13

## 2022-01-13 VITALS — SYSTOLIC BLOOD PRESSURE: 118 MMHG | WEIGHT: 260 LBS | BODY MASS INDEX: 43.27 KG/M2 | DIASTOLIC BLOOD PRESSURE: 70 MMHG

## 2022-01-13 DIAGNOSIS — O24.113 PREGNANCY WITH TYPE 2 DIABETES MELLITUS IN THIRD TRIMESTER: ICD-10-CM

## 2022-01-13 DIAGNOSIS — K59.00 CONSTIPATION DURING PREGNANCY IN THIRD TRIMESTER: ICD-10-CM

## 2022-01-13 DIAGNOSIS — O99.019 IRON DEFICIENCY ANEMIA DURING PREGNANCY: ICD-10-CM

## 2022-01-13 DIAGNOSIS — O09.523 HIGH RISK PREGNANCY, MULTIGRAVIDA OF ADVANCED MATERNAL AGE IN THIRD TRIMESTER: ICD-10-CM

## 2022-01-13 DIAGNOSIS — R06.02 SHORTNESS OF BREATH DURING PREGNANCY: ICD-10-CM

## 2022-01-13 DIAGNOSIS — O99.613 CONSTIPATION DURING PREGNANCY IN THIRD TRIMESTER: ICD-10-CM

## 2022-01-13 DIAGNOSIS — D50.9 IRON DEFICIENCY ANEMIA DURING PREGNANCY: ICD-10-CM

## 2022-01-13 DIAGNOSIS — O99.891 SHORTNESS OF BREATH DURING PREGNANCY: ICD-10-CM

## 2022-01-13 DIAGNOSIS — Z98.891 HISTORY OF CESAREAN SECTION: ICD-10-CM

## 2022-01-13 DIAGNOSIS — Z92.89 HISTORY OF TRANSFUSION OF PACKED RBC: ICD-10-CM

## 2022-01-13 DIAGNOSIS — Z67.91 RH NEGATIVE STATUS DURING PREGNANCY IN THIRD TRIMESTER: ICD-10-CM

## 2022-01-13 DIAGNOSIS — O10.913 MATERNAL CHRONIC HYPERTENSION IN THIRD TRIMESTER: ICD-10-CM

## 2022-01-13 DIAGNOSIS — Z3A.37 37 WEEKS GESTATION OF PREGNANCY: Primary | ICD-10-CM

## 2022-01-13 DIAGNOSIS — O26.893 RH NEGATIVE STATUS DURING PREGNANCY IN THIRD TRIMESTER: ICD-10-CM

## 2022-01-13 PROCEDURE — 99213 OFFICE O/P EST LOW 20 MIN: CPT | Performed by: NURSE PRACTITIONER

## 2022-01-13 RX ORDER — CETIRIZINE HYDROCHLORIDE 10 MG/1
10 TABLET ORAL DAILY
COMMUNITY
Start: 2022-01-02

## 2022-01-13 RX ORDER — ONDANSETRON 8 MG/1
TABLET, ORALLY DISINTEGRATING ORAL
COMMUNITY
Start: 2022-01-09 | End: 2022-02-25

## 2022-01-13 NOTE — PROGRESS NOTES
CC: Prenatal visit    Donna Linder is a 36 y.o.  at 37w1d.  Doing well, her blood sugars have been staying in normal ranges.  She reports good report from endocrinologist and hopes to discontinue insulin after delivery and switch to trulicity.  Recently she has experienced intermittent pelvic pressure with standing which relieves with sitting or laying. Denies contractions, LOF, or VB.  Reports good FM.    /70   Wt 118 kg (260 lb)   LMP 2021 (Exact Date)   BMI 43.27 kg/m²     US prelim- fetus vertex, JAYMIE 13.36 cm,  bpm, BPP 8/8     Fetal Heart Rate: 140us     Problems (from 21 to present)     Problem Noted Resolved    Constipation in pregnancy 1/3/2022 by Moni Hubbard DO No    Overview Signed 1/3/2022  7:27 PM by Moni Hubbard DO     On colace, miralax bid, stimulant bid which helps some  Po iron now qd instead of bid         Iron deficiency anemia during pregnancy 1/3/2022 by Moni Hubbadr DO No    Overview Signed 1/3/2022  7:28 PM by Moni Hubbard DO     Seeing heme onc, receiving IV iron, po iron qd, and po B12         History of  section 10/16/2021 by Bernardo Douglas MD No    Overview Signed 10/16/2021 12:09 AM by Bernardo Douglas MD     Wants to have repeat  section after [facility] counseling         History of transfusion of packed RBC 10/16/2021 by Bernardo Douglas MD No    Overview Signed 10/16/2021 12:13 AM by Bernardo Douglas MD     With  section.  History mild anemia.  Start iron         Shortness of breath during pregnancy 2021 by Bernardo Douglas MD No    Overview Signed 2021 10:52 AM by Bernardo Douglas MD     Patient is short of breath during second trimester pregnancy but says it dates back to when she had Covid in January.  Is on albuterol inhaler         Maternal chronic hypertension in third trimester 2021 by Kaitlin Whyte APRN No    Overview Signed 1/3/2022  7:23 PM by Moni Hubbard DO     Labetalol 100 mg  bid         High risk pregnancy, multigravida of advanced maternal age in third trimester 2021 by Kaitlin Whyte APRN No    Rh negative status during pregnancy in third trimester 2021 by Kaitlin Whyte APRN No    Overview Signed 1/3/2022  7:24 PM by Moni Hubbard, DO     S/p Rhogam         Pregnancy with type 2 diabetes mellitus in third trimester 2021 by Kaitlin Whyte APRN No    Overview Addendum 1/3/2022  7:24 PM by Moni Hubbard, DO     Dx at 5w6d on   EarlyGDM vs. Type 2 diabetes?  - A1c- 5.8    : Elevated fasting glucose. Metformin increased to 1000mg at bedtime and 500mg in the morning per Dr. Cummins    : Fasting sugars in the low 100s, improved from 120s. However,  Fasting never <95. 1 hr PP, 140 or less. Will increased to 1500mg at bedtime and 500mg in the morning.    1/3/22: seeing Dr. Ga, did not bring log, reports values mostly WNL on Lantus 22 qhs, Humalog tid with meals SSI with carb count usu 14-20 units    Twice weekly antepartum surveillance, normal fetal echo             Previous Version    Pain of round ligament affecting pregnancy, antepartum 2021 by Argenis Winston, APRN 2021 by Argenis Winston APRN    Overview Signed 2021 11:57 AM by Argenis Winston APRN     Cramping as of 13 weeks; pelvic belt given. Discussed physical therapy referral, pt declines at this time.          Nausea and vomiting during pregnancy prior to 22 weeks gestation 2021 by Argenis Winston APRN 2021 by Lauren Rouse APRN          A/P: Donna Linder is a 36 y.o.  at 37w1d.  Recommended pt try her maternity belt to help with pelvic discomfort while standing  - RTC on 2022 for US and OB appt      Diagnosis Plan   1. 37 weeks gestation of pregnancy     2. High risk pregnancy, multigravida of advanced maternal age in third trimester     3. Constipation during pregnancy in third trimester     4. Rh negative status during  pregnancy in third trimester     5. Iron deficiency anemia during pregnancy     6. History of  section     7. History of transfusion of packed RBC     8. Shortness of breath during pregnancy     9. Maternal chronic hypertension in third trimester     10. Pregnancy with type 2 diabetes mellitus in third trimester         Lauren Rouse, KEE  2022  10:40 CST

## 2022-01-16 LAB — BACTERIA SPEC AEROBE CULT: ABNORMAL

## 2022-01-17 ENCOUNTER — ROUTINE PRENATAL (OUTPATIENT)
Dept: OBSTETRICS AND GYNECOLOGY | Facility: CLINIC | Age: 37
End: 2022-01-17

## 2022-01-17 VITALS — DIASTOLIC BLOOD PRESSURE: 82 MMHG | WEIGHT: 262 LBS | SYSTOLIC BLOOD PRESSURE: 132 MMHG | BODY MASS INDEX: 43.6 KG/M2

## 2022-01-17 DIAGNOSIS — O99.019 IRON DEFICIENCY ANEMIA DURING PREGNANCY: ICD-10-CM

## 2022-01-17 DIAGNOSIS — O99.891 SHORTNESS OF BREATH DURING PREGNANCY: ICD-10-CM

## 2022-01-17 DIAGNOSIS — O26.893 RH NEGATIVE STATUS DURING PREGNANCY IN THIRD TRIMESTER: ICD-10-CM

## 2022-01-17 DIAGNOSIS — R06.02 SHORTNESS OF BREATH DURING PREGNANCY: ICD-10-CM

## 2022-01-17 DIAGNOSIS — Z92.89 HISTORY OF TRANSFUSION OF PACKED RBC: ICD-10-CM

## 2022-01-17 DIAGNOSIS — O24.113 PREGNANCY WITH TYPE 2 DIABETES MELLITUS IN THIRD TRIMESTER: ICD-10-CM

## 2022-01-17 DIAGNOSIS — O99.613 CONSTIPATION DURING PREGNANCY IN THIRD TRIMESTER: ICD-10-CM

## 2022-01-17 DIAGNOSIS — Z67.91 RH NEGATIVE STATUS DURING PREGNANCY IN THIRD TRIMESTER: ICD-10-CM

## 2022-01-17 DIAGNOSIS — Z3A.37 37 WEEKS GESTATION OF PREGNANCY: Primary | ICD-10-CM

## 2022-01-17 DIAGNOSIS — D50.9 IRON DEFICIENCY ANEMIA DURING PREGNANCY: ICD-10-CM

## 2022-01-17 DIAGNOSIS — Z98.891 HISTORY OF CESAREAN SECTION: ICD-10-CM

## 2022-01-17 DIAGNOSIS — O09.523 HIGH RISK PREGNANCY, MULTIGRAVIDA OF ADVANCED MATERNAL AGE IN THIRD TRIMESTER: ICD-10-CM

## 2022-01-17 DIAGNOSIS — K59.00 CONSTIPATION DURING PREGNANCY IN THIRD TRIMESTER: ICD-10-CM

## 2022-01-17 DIAGNOSIS — O10.913 MATERNAL CHRONIC HYPERTENSION IN THIRD TRIMESTER: ICD-10-CM

## 2022-01-17 PROCEDURE — 99213 OFFICE O/P EST LOW 20 MIN: CPT | Performed by: NURSE PRACTITIONER

## 2022-01-17 NOTE — PROGRESS NOTES
CC: Prenatal visit    Donna Linder is a 36 y.o.  at 37w5d.  Doing well.  No complaints.  Denies contractions, LOF, or VB.  Reports good FM.    /82   Wt 119 kg (262 lb)   LMP 2021 (Exact Date)   BMI 43.60 kg/m²        Fetal Heart Rate: 150     Problems (from 21 to present)     Problem Noted Resolved    Constipation in pregnancy 1/3/2022 by Moni Hubbard DO No    Overview Signed 1/3/2022  7:27 PM by Moni Hubbard DO     On colace, miralax bid, stimulant bid which helps some  Po iron now qd instead of bid         Iron deficiency anemia during pregnancy 1/3/2022 by Moni Hubbard DO No    Overview Signed 1/3/2022  7:28 PM by Moni Hubbard DO     Seeing heme onc, receiving IV iron, po iron qd, and po B12         History of  section 10/16/2021 by Bernardo Douglas MD No    Overview Signed 10/16/2021 12:09 AM by Bernardo Douglas MD     Wants to have repeat  section after [facility] counseling         History of transfusion of packed RBC 10/16/2021 by Bernardo Douglas MD No    Overview Signed 10/16/2021 12:13 AM by Bernardo Douglas MD     With  section.  History mild anemia.  Start iron         Shortness of breath during pregnancy 2021 by Bernardo Douglas MD No    Overview Signed 2021 10:52 AM by Bernardo Douglas MD     Patient is short of breath during second trimester pregnancy but says it dates back to when she had Covid in January.  Is on albuterol inhaler         Maternal chronic hypertension in third trimester 2021 by Kaitlin Whyte APRN No    Overview Signed 1/3/2022  7:23 PM by Moni Hubbard DO     Labetalol 100 mg bid         High risk pregnancy, multigravida of advanced maternal age in third trimester 2021 by Kaitlin Whyte, KEE No    Rh negative status during pregnancy in third trimester 2021 by Kaitlin Whyte APRN No    Overview Signed 1/3/2022  7:24 PM by Moni Hubbard DO     S/p Rhogam         Pregnancy with type 2  diabetes mellitus in third trimester 2021 by Kaitlin Whyte APRN No    Overview Addendum 1/3/2022  7:24 PM by Moni Hubbard, DO     Dx at 5w6d on   EarlyGDM vs. Type 2 diabetes?  - A1c- 5.8    : Elevated fasting glucose. Metformin increased to 1000mg at bedtime and 500mg in the morning per Dr. Cummins    : Fasting sugars in the low 100s, improved from 120s. However,  Fasting never <95. 1 hr PP, 140 or less. Will increased to 1500mg at bedtime and 500mg in the morning.    1/3/22: seeing Dr. Ga, did not bring log, reports values mostly WNL on Lantus 22 qhs, Humalog tid with meals SSI with carb count usu 14-20 units    Twice weekly antepartum surveillance, normal fetal echo             Previous Version    Pain of round ligament affecting pregnancy, antepartum 2021 by Argenis Winston APRN 2021 by Argenis Winston APRN    Overview Signed 2021 11:57 AM by Argenis Winston APRN     Cramping as of 13 weeks; pelvic belt given. Discussed physical therapy referral, pt declines at this time.          Nausea and vomiting during pregnancy prior to 22 weeks gestation 2021 by Argenis Winston APRN 2021 by Lauren Rouse APRN          A/P: Donna Linder is a 36 y.o.  at 37w5d.  - RT L/D on 2021 for RCS     Diagnosis Plan   1. 37 weeks gestation of pregnancy     2. High risk pregnancy, multigravida of advanced maternal age in third trimester     3. Constipation during pregnancy in third trimester     4. Iron deficiency anemia during pregnancy     5. History of  section     6. History of transfusion of packed RBC     7. Shortness of breath during pregnancy     8. Maternal chronic hypertension in third trimester     9. Rh negative status during pregnancy in third trimester     10. Pregnancy with type 2 diabetes mellitus in third trimester         KEE Jaimes  2022  10:00 CST

## 2022-01-18 ENCOUNTER — LAB (OUTPATIENT)
Dept: LAB | Facility: HOSPITAL | Age: 37
End: 2022-01-18

## 2022-01-18 DIAGNOSIS — Z98.891 HISTORY OF CESAREAN SECTION: ICD-10-CM

## 2022-01-18 LAB — SARS-COV-2 N GENE RESP QL NAA+PROBE: NOT DETECTED

## 2022-01-18 PROCEDURE — 87635 SARS-COV-2 COVID-19 AMP PRB: CPT

## 2022-01-18 PROCEDURE — C9803 HOPD COVID-19 SPEC COLLECT: HCPCS

## 2022-01-19 ENCOUNTER — ANESTHESIA EVENT (OUTPATIENT)
Dept: LABOR AND DELIVERY | Facility: HOSPITAL | Age: 37
End: 2022-01-19

## 2022-01-20 ENCOUNTER — HOSPITAL ENCOUNTER (INPATIENT)
Facility: HOSPITAL | Age: 37
LOS: 4 days | Discharge: HOME | End: 2022-01-24
Attending: OBSTETRICS & GYNECOLOGY | Admitting: OBSTETRICS & GYNECOLOGY

## 2022-01-20 ENCOUNTER — ANESTHESIA (OUTPATIENT)
Dept: LABOR AND DELIVERY | Facility: HOSPITAL | Age: 37
End: 2022-01-20

## 2022-01-20 DIAGNOSIS — K66.1 HEMOPERITONEUM: Primary | ICD-10-CM

## 2022-01-20 DIAGNOSIS — O10.919 CHRONIC HYPERTENSION AFFECTING PREGNANCY: ICD-10-CM

## 2022-01-20 DIAGNOSIS — Z98.891 HISTORY OF CESAREAN SECTION: ICD-10-CM

## 2022-01-20 PROBLEM — Z36.2: Status: RESOLVED | Noted: 2021-09-24 | Resolved: 2022-01-20

## 2022-01-20 PROBLEM — M25.532 ACUTE PAIN OF LEFT WRIST: Status: RESOLVED | Noted: 2021-01-05 | Resolved: 2022-01-20

## 2022-01-20 LAB
ABO GROUP BLD: NORMAL
ALBUMIN SERPL-MCNC: 3.6 G/DL (ref 3.5–5.2)
ALBUMIN/GLOB SERPL: 1.4 G/DL
ALP SERPL-CCNC: 108 U/L (ref 39–117)
ALT SERPL W P-5'-P-CCNC: 14 U/L (ref 1–33)
AMPHET+METHAMPHET UR QL: NEGATIVE
AMPHETAMINES UR QL: NEGATIVE
ANION GAP SERPL CALCULATED.3IONS-SCNC: 10 MMOL/L (ref 5–15)
AST SERPL-CCNC: 36 U/L (ref 1–32)
BARBITURATES UR QL SCN: NEGATIVE
BENZODIAZ UR QL SCN: NEGATIVE
BILIRUB SERPL-MCNC: 0.3 MG/DL (ref 0–1.2)
BLD GP AB SCN SERPL QL: NEGATIVE
BUN SERPL-MCNC: 9 MG/DL (ref 6–20)
BUN/CREAT SERPL: 12.9 (ref 7–25)
BUPRENORPHINE SERPL-MCNC: NEGATIVE NG/ML
CALCIUM SPEC-SCNC: 9.7 MG/DL (ref 8.6–10.5)
CANNABINOIDS SERPL QL: NEGATIVE
CHLORIDE SERPL-SCNC: 105 MMOL/L (ref 98–107)
CO2 SERPL-SCNC: 19 MMOL/L (ref 22–29)
COCAINE UR QL: NEGATIVE
CREAT SERPL-MCNC: 0.7 MG/DL (ref 0.57–1)
DEPRECATED RDW RBC AUTO: 50.3 FL (ref 37–54)
ERYTHROCYTE [DISTWIDTH] IN BLOOD BY AUTOMATED COUNT: 16.2 % (ref 12.3–15.4)
GFR SERPL CREATININE-BSD FRML MDRD: 94 ML/MIN/1.73
GLOBULIN UR ELPH-MCNC: 2.5 GM/DL
GLUCOSE BLDC GLUCOMTR-MCNC: 189 MG/DL (ref 70–130)
GLUCOSE BLDC GLUCOMTR-MCNC: 79 MG/DL (ref 70–130)
GLUCOSE BLDC GLUCOMTR-MCNC: 93 MG/DL (ref 70–130)
GLUCOSE SERPL-MCNC: 98 MG/DL (ref 65–99)
HCT VFR BLD AUTO: 29.6 % (ref 34–46.6)
HGB BLD-MCNC: 9.9 G/DL (ref 12–15.9)
Lab: NORMAL
MCH RBC QN AUTO: 28.4 PG (ref 26.6–33)
MCHC RBC AUTO-ENTMCNC: 33.4 G/DL (ref 31.5–35.7)
MCV RBC AUTO: 84.8 FL (ref 79–97)
METHADONE UR QL SCN: NEGATIVE
OPIATES UR QL: NEGATIVE
OXYCODONE UR QL SCN: NEGATIVE
PCP UR QL SCN: NEGATIVE
PLATELET # BLD AUTO: 401 10*3/MM3 (ref 140–450)
PMV BLD AUTO: 9.4 FL (ref 6–12)
POTASSIUM SERPL-SCNC: 4.5 MMOL/L (ref 3.5–5.2)
PROPOXYPH UR QL: NEGATIVE
PROT SERPL-MCNC: 6.1 G/DL (ref 6–8.5)
RBC # BLD AUTO: 3.49 10*6/MM3 (ref 3.77–5.28)
RH BLD: NEGATIVE
SODIUM SERPL-SCNC: 134 MMOL/L (ref 136–145)
T&S EXPIRATION DATE: NORMAL
TRICYCLICS UR QL SCN: NEGATIVE
WBC NRBC COR # BLD: 9.69 10*3/MM3 (ref 3.4–10.8)

## 2022-01-20 PROCEDURE — 82962 GLUCOSE BLOOD TEST: CPT

## 2022-01-20 PROCEDURE — 25010000002 ONDANSETRON PER 1 MG: Performed by: NURSE ANESTHETIST, CERTIFIED REGISTERED

## 2022-01-20 PROCEDURE — 25010000002 PHENYLEPHRINE 10 MG/ML SOLUTION: Performed by: NURSE ANESTHETIST, CERTIFIED REGISTERED

## 2022-01-20 PROCEDURE — 86923 COMPATIBILITY TEST ELECTRIC: CPT

## 2022-01-20 PROCEDURE — 25010000002 MORPHINE PER 10 MG: Performed by: NURSE ANESTHETIST, CERTIFIED REGISTERED

## 2022-01-20 PROCEDURE — 86900 BLOOD TYPING SEROLOGIC ABO: CPT | Performed by: OBSTETRICS & GYNECOLOGY

## 2022-01-20 PROCEDURE — 86901 BLOOD TYPING SEROLOGIC RH(D): CPT | Performed by: OBSTETRICS & GYNECOLOGY

## 2022-01-20 PROCEDURE — 85027 COMPLETE CBC AUTOMATED: CPT | Performed by: OBSTETRICS & GYNECOLOGY

## 2022-01-20 PROCEDURE — 59514 CESAREAN DELIVERY ONLY: CPT | Performed by: SPECIALIST/TECHNOLOGIST, OTHER

## 2022-01-20 PROCEDURE — 59515 CESAREAN DELIVERY: CPT | Performed by: OBSTETRICS & GYNECOLOGY

## 2022-01-20 PROCEDURE — 63710000001 INSULIN DETEMIR PER 5 UNITS: Performed by: OBSTETRICS & GYNECOLOGY

## 2022-01-20 PROCEDURE — 63710000001 DIPHENHYDRAMINE PER 50 MG: Performed by: NURSE ANESTHETIST, CERTIFIED REGISTERED

## 2022-01-20 PROCEDURE — 51703 INSERT BLADDER CATH COMPLEX: CPT

## 2022-01-20 PROCEDURE — 86850 RBC ANTIBODY SCREEN: CPT | Performed by: OBSTETRICS & GYNECOLOGY

## 2022-01-20 PROCEDURE — 80306 DRUG TEST PRSMV INSTRMNT: CPT | Performed by: OBSTETRICS & GYNECOLOGY

## 2022-01-20 PROCEDURE — 25010000002 CEFAZOLIN PER 500 MG

## 2022-01-20 PROCEDURE — 25010000002 KETOROLAC TROMETHAMINE PER 15 MG: Performed by: OBSTETRICS & GYNECOLOGY

## 2022-01-20 PROCEDURE — 25010000002 KETOROLAC TROMETHAMINE PER 15 MG: Performed by: NURSE ANESTHETIST, CERTIFIED REGISTERED

## 2022-01-20 PROCEDURE — 80053 COMPREHEN METABOLIC PANEL: CPT | Performed by: OBSTETRICS & GYNECOLOGY

## 2022-01-20 PROCEDURE — 88307 TISSUE EXAM BY PATHOLOGIST: CPT

## 2022-01-20 RX ORDER — PRENATAL VIT/IRON FUM/FOLIC AC 27MG-0.8MG
1 TABLET ORAL DAILY
Status: DISCONTINUED | OUTPATIENT
Start: 2022-01-20 | End: 2022-01-24 | Stop reason: HOSPADM

## 2022-01-20 RX ORDER — TRISODIUM CITRATE DIHYDRATE AND CITRIC ACID MONOHYDRATE 500; 334 MG/5ML; MG/5ML
30 SOLUTION ORAL ONCE
Status: COMPLETED | OUTPATIENT
Start: 2022-01-20 | End: 2022-01-20

## 2022-01-20 RX ORDER — POLYETHYLENE GLYCOL 3350 17 G/17G
17 POWDER, FOR SOLUTION ORAL DAILY
Status: DISCONTINUED | OUTPATIENT
Start: 2022-01-20 | End: 2022-01-22

## 2022-01-20 RX ORDER — BUPIVACAINE HYDROCHLORIDE 7.5 MG/ML
INJECTION, SOLUTION EPIDURAL; RETROBULBAR
Status: COMPLETED | OUTPATIENT
Start: 2022-01-20 | End: 2022-01-20

## 2022-01-20 RX ORDER — CALCIUM CARBONATE 200(500)MG
1 TABLET,CHEWABLE ORAL EVERY 4 HOURS PRN
Status: DISCONTINUED | OUTPATIENT
Start: 2022-01-20 | End: 2022-01-24 | Stop reason: HOSPADM

## 2022-01-20 RX ORDER — LABETALOL 100 MG/1
100 TABLET, FILM COATED ORAL 2 TIMES DAILY
Status: DISCONTINUED | OUTPATIENT
Start: 2022-01-20 | End: 2022-01-21

## 2022-01-20 RX ORDER — SODIUM CHLORIDE, SODIUM LACTATE, POTASSIUM CHLORIDE, CALCIUM CHLORIDE 600; 310; 30; 20 MG/100ML; MG/100ML; MG/100ML; MG/100ML
125 INJECTION, SOLUTION INTRAVENOUS CONTINUOUS
Status: DISCONTINUED | OUTPATIENT
Start: 2022-01-20 | End: 2022-01-20 | Stop reason: HOSPADM

## 2022-01-20 RX ORDER — SODIUM CHLORIDE 0.9 % (FLUSH) 0.9 %
10 SYRINGE (ML) INJECTION AS NEEDED
Status: DISCONTINUED | OUTPATIENT
Start: 2022-01-20 | End: 2022-01-20 | Stop reason: HOSPADM

## 2022-01-20 RX ORDER — OXYTOCIN/0.9 % SODIUM CHLORIDE 30/500 ML
PLASTIC BAG, INJECTION (ML) INTRAVENOUS CONTINUOUS PRN
Status: DISCONTINUED | OUTPATIENT
Start: 2022-01-20 | End: 2022-01-20 | Stop reason: SURG

## 2022-01-20 RX ORDER — DIPHENHYDRAMINE HYDROCHLORIDE 50 MG/ML
25 INJECTION INTRAMUSCULAR; INTRAVENOUS EVERY 4 HOURS PRN
Status: DISCONTINUED | OUTPATIENT
Start: 2022-01-20 | End: 2022-01-21

## 2022-01-20 RX ORDER — CEFAZOLIN SODIUM IN 0.9 % NACL 3 G/100 ML
3 INTRAVENOUS SOLUTION, PIGGYBACK (ML) INTRAVENOUS ONCE
Status: COMPLETED | OUTPATIENT
Start: 2022-01-20 | End: 2022-01-20

## 2022-01-20 RX ORDER — OXYTOCIN/0.9 % SODIUM CHLORIDE 30/500 ML
650 PLASTIC BAG, INJECTION (ML) INTRAVENOUS ONCE
Status: DISCONTINUED | OUTPATIENT
Start: 2022-01-20 | End: 2022-01-21

## 2022-01-20 RX ORDER — DOCUSATE SODIUM 100 MG/1
100 CAPSULE, LIQUID FILLED ORAL 2 TIMES DAILY
Status: DISCONTINUED | OUTPATIENT
Start: 2022-01-20 | End: 2022-01-24 | Stop reason: HOSPADM

## 2022-01-20 RX ORDER — SCOLOPAMINE TRANSDERMAL SYSTEM 1 MG/1
1 PATCH, EXTENDED RELEASE TRANSDERMAL
Status: DISCONTINUED | OUTPATIENT
Start: 2022-01-20 | End: 2022-01-24 | Stop reason: HOSPADM

## 2022-01-20 RX ORDER — ONDANSETRON 2 MG/ML
4 INJECTION INTRAMUSCULAR; INTRAVENOUS ONCE AS NEEDED
Status: DISCONTINUED | OUTPATIENT
Start: 2022-01-20 | End: 2022-01-21

## 2022-01-20 RX ORDER — SODIUM CHLORIDE, SODIUM LACTATE, POTASSIUM CHLORIDE, CALCIUM CHLORIDE 600; 310; 30; 20 MG/100ML; MG/100ML; MG/100ML; MG/100ML
125 INJECTION, SOLUTION INTRAVENOUS CONTINUOUS
Status: DISCONTINUED | OUTPATIENT
Start: 2022-01-20 | End: 2022-01-21

## 2022-01-20 RX ORDER — ACETAMINOPHEN 325 MG/1
650 TABLET ORAL EVERY 4 HOURS PRN
Status: DISCONTINUED | OUTPATIENT
Start: 2022-01-20 | End: 2022-01-21

## 2022-01-20 RX ORDER — SODIUM CHLORIDE, SODIUM LACTATE, POTASSIUM CHLORIDE, CALCIUM CHLORIDE 600; 310; 30; 20 MG/100ML; MG/100ML; MG/100ML; MG/100ML
INJECTION, SOLUTION INTRAVENOUS
Status: COMPLETED
Start: 2022-01-20 | End: 2022-01-21

## 2022-01-20 RX ORDER — CARBOPROST TROMETHAMINE 250 UG/ML
250 INJECTION, SOLUTION INTRAMUSCULAR
Status: DISCONTINUED | OUTPATIENT
Start: 2022-01-20 | End: 2022-01-20 | Stop reason: HOSPADM

## 2022-01-20 RX ORDER — OXYTOCIN/0.9 % SODIUM CHLORIDE 30/500 ML
650 PLASTIC BAG, INJECTION (ML) INTRAVENOUS ONCE
Status: DISCONTINUED | OUTPATIENT
Start: 2022-01-20 | End: 2022-01-20 | Stop reason: HOSPADM

## 2022-01-20 RX ORDER — OXYTOCIN/0.9 % SODIUM CHLORIDE 30/500 ML
85 PLASTIC BAG, INJECTION (ML) INTRAVENOUS ONCE
Status: COMPLETED | OUTPATIENT
Start: 2022-01-20 | End: 2022-01-20

## 2022-01-20 RX ORDER — MISOPROSTOL 200 UG/1
800 TABLET ORAL ONCE AS NEEDED
Status: DISCONTINUED | OUTPATIENT
Start: 2022-01-20 | End: 2022-01-20 | Stop reason: HOSPADM

## 2022-01-20 RX ORDER — DIPHENHYDRAMINE HCL 25 MG
25 CAPSULE ORAL EVERY 4 HOURS PRN
Status: DISCONTINUED | OUTPATIENT
Start: 2022-01-20 | End: 2022-01-21

## 2022-01-20 RX ORDER — ACETAMINOPHEN 500 MG
1000 TABLET ORAL EVERY 6 HOURS
Status: DISCONTINUED | OUTPATIENT
Start: 2022-01-20 | End: 2022-01-24 | Stop reason: HOSPADM

## 2022-01-20 RX ORDER — IBUPROFEN 800 MG/1
800 TABLET ORAL EVERY 8 HOURS SCHEDULED
Status: DISCONTINUED | OUTPATIENT
Start: 2022-01-21 | End: 2022-01-24 | Stop reason: HOSPADM

## 2022-01-20 RX ORDER — CETIRIZINE HYDROCHLORIDE 10 MG/1
10 TABLET ORAL DAILY
Status: DISCONTINUED | OUTPATIENT
Start: 2022-01-20 | End: 2022-01-24 | Stop reason: HOSPADM

## 2022-01-20 RX ORDER — SODIUM CHLORIDE 0.9 % (FLUSH) 0.9 %
10 SYRINGE (ML) INJECTION EVERY 12 HOURS SCHEDULED
Status: DISCONTINUED | OUTPATIENT
Start: 2022-01-20 | End: 2022-01-20 | Stop reason: HOSPADM

## 2022-01-20 RX ORDER — OXYTOCIN/0.9 % SODIUM CHLORIDE 30/500 ML
85 PLASTIC BAG, INJECTION (ML) INTRAVENOUS ONCE
Status: DISCONTINUED | OUTPATIENT
Start: 2022-01-20 | End: 2022-01-21

## 2022-01-20 RX ORDER — PHENYLEPHRINE HYDROCHLORIDE 10 MG/ML
INJECTION INTRAVENOUS AS NEEDED
Status: DISCONTINUED | OUTPATIENT
Start: 2022-01-20 | End: 2022-01-20 | Stop reason: SURG

## 2022-01-20 RX ORDER — OXYTOCIN/0.9 % SODIUM CHLORIDE 30/500 ML
PLASTIC BAG, INJECTION (ML) INTRAVENOUS
Status: COMPLETED
Start: 2022-01-20 | End: 2022-01-20

## 2022-01-20 RX ORDER — METHYLERGONOVINE MALEATE 0.2 MG/ML
200 INJECTION INTRAVENOUS ONCE AS NEEDED
Status: DISCONTINUED | OUTPATIENT
Start: 2022-01-20 | End: 2022-01-20 | Stop reason: HOSPADM

## 2022-01-20 RX ORDER — ONDANSETRON 4 MG/1
4 TABLET, FILM COATED ORAL EVERY 8 HOURS PRN
Status: DISCONTINUED | OUTPATIENT
Start: 2022-01-20 | End: 2022-01-24 | Stop reason: HOSPADM

## 2022-01-20 RX ORDER — SODIUM CHLORIDE, SODIUM LACTATE, POTASSIUM CHLORIDE, CALCIUM CHLORIDE 600; 310; 30; 20 MG/100ML; MG/100ML; MG/100ML; MG/100ML
125 INJECTION, SOLUTION INTRAVENOUS CONTINUOUS
Status: DISCONTINUED | OUTPATIENT
Start: 2022-01-21 | End: 2022-01-24 | Stop reason: HOSPADM

## 2022-01-20 RX ORDER — SIMETHICONE 80 MG
80 TABLET,CHEWABLE ORAL 4 TIMES DAILY
Status: DISCONTINUED | OUTPATIENT
Start: 2022-01-20 | End: 2022-01-24 | Stop reason: HOSPADM

## 2022-01-20 RX ORDER — KETOROLAC TROMETHAMINE 30 MG/ML
30 INJECTION, SOLUTION INTRAMUSCULAR; INTRAVENOUS EVERY 6 HOURS
Status: DISPENSED | OUTPATIENT
Start: 2022-01-20 | End: 2022-01-21

## 2022-01-20 RX ORDER — KETOROLAC TROMETHAMINE 30 MG/ML
INJECTION, SOLUTION INTRAMUSCULAR; INTRAVENOUS AS NEEDED
Status: DISCONTINUED | OUTPATIENT
Start: 2022-01-20 | End: 2022-01-20 | Stop reason: SURG

## 2022-01-20 RX ORDER — ALUMINA, MAGNESIA, AND SIMETHICONE 2400; 2400; 240 MG/30ML; MG/30ML; MG/30ML
15 SUSPENSION ORAL EVERY 4 HOURS PRN
Status: DISCONTINUED | OUTPATIENT
Start: 2022-01-20 | End: 2022-01-24 | Stop reason: HOSPADM

## 2022-01-20 RX ORDER — MORPHINE SULFATE 1 MG/ML
INJECTION, SOLUTION EPIDURAL; INTRATHECAL; INTRAVENOUS AS NEEDED
Status: DISCONTINUED | OUTPATIENT
Start: 2022-01-20 | End: 2022-01-20 | Stop reason: SURG

## 2022-01-20 RX ORDER — HYDROCORTISONE 25 MG/G
CREAM TOPICAL 3 TIMES DAILY PRN
Status: DISCONTINUED | OUTPATIENT
Start: 2022-01-20 | End: 2022-01-24 | Stop reason: HOSPADM

## 2022-01-20 RX ORDER — ONDANSETRON 2 MG/ML
INJECTION INTRAMUSCULAR; INTRAVENOUS AS NEEDED
Status: DISCONTINUED | OUTPATIENT
Start: 2022-01-20 | End: 2022-01-20 | Stop reason: SURG

## 2022-01-20 RX ORDER — ONDANSETRON 2 MG/ML
4 INJECTION INTRAMUSCULAR; INTRAVENOUS EVERY 6 HOURS PRN
Status: DISCONTINUED | OUTPATIENT
Start: 2022-01-20 | End: 2022-01-24 | Stop reason: HOSPADM

## 2022-01-20 RX ADMIN — SODIUM CHLORIDE, POTASSIUM CHLORIDE, SODIUM LACTATE AND CALCIUM CHLORIDE 125 ML/HR: 600; 310; 30; 20 INJECTION, SOLUTION INTRAVENOUS at 16:51

## 2022-01-20 RX ADMIN — OXYTOCIN-SODIUM CHLORIDE 0.9% IV SOLN 30 UNIT/500ML 85 ML/HR: 30-0.9/5 SOLUTION at 09:05

## 2022-01-20 RX ADMIN — INSULIN DETEMIR 10 UNITS: 100 INJECTION, SOLUTION SUBCUTANEOUS at 20:23

## 2022-01-20 RX ADMIN — Medication 3 G: at 07:32

## 2022-01-20 RX ADMIN — CETIRIZINE HYDROCHLORIDE 10 MG: 10 TABLET, FILM COATED ORAL at 14:05

## 2022-01-20 RX ADMIN — ACETAMINOPHEN 1000 MG: 500 TABLET ORAL at 20:06

## 2022-01-20 RX ADMIN — Medication 1 TABLET: at 14:06

## 2022-01-20 RX ADMIN — DIPHENHYDRAMINE HYDROCHLORIDE 25 MG: 25 CAPSULE ORAL at 21:33

## 2022-01-20 RX ADMIN — KETOROLAC TROMETHAMINE 30 MG: 30 INJECTION, SOLUTION INTRAMUSCULAR; INTRAVENOUS at 08:39

## 2022-01-20 RX ADMIN — LABETALOL HYDROCHLORIDE 100 MG: 100 TABLET, FILM COATED ORAL at 14:05

## 2022-01-20 RX ADMIN — SIMETHICONE 80 MG: 80 TABLET, CHEWABLE ORAL at 21:52

## 2022-01-20 RX ADMIN — LABETALOL HYDROCHLORIDE 100 MG: 100 TABLET, FILM COATED ORAL at 20:07

## 2022-01-20 RX ADMIN — SODIUM CHLORIDE, POTASSIUM CHLORIDE, SODIUM LACTATE AND CALCIUM CHLORIDE: 600; 310; 30; 20 INJECTION, SOLUTION INTRAVENOUS at 07:23

## 2022-01-20 RX ADMIN — PHENYLEPHRINE HYDROCHLORIDE 100 MCG: 10 INJECTION INTRAVENOUS at 07:45

## 2022-01-20 RX ADMIN — DOCUSATE SODIUM 100 MG: 100 CAPSULE, LIQUID FILLED ORAL at 20:07

## 2022-01-20 RX ADMIN — SIMETHICONE 80 MG: 80 TABLET, CHEWABLE ORAL at 14:05

## 2022-01-20 RX ADMIN — ACETAMINOPHEN 1000 MG: 500 TABLET ORAL at 14:06

## 2022-01-20 RX ADMIN — PHENYLEPHRINE HYDROCHLORIDE 100 MCG: 10 INJECTION INTRAVENOUS at 07:54

## 2022-01-20 RX ADMIN — ONDANSETRON 8 MG: 2 INJECTION INTRAMUSCULAR; INTRAVENOUS at 07:23

## 2022-01-20 RX ADMIN — PHENYLEPHRINE HYDROCHLORIDE 100 MCG: 10 INJECTION INTRAVENOUS at 07:36

## 2022-01-20 RX ADMIN — SODIUM CHLORIDE, POTASSIUM CHLORIDE, SODIUM LACTATE AND CALCIUM CHLORIDE: 600; 310; 30; 20 INJECTION, SOLUTION INTRAVENOUS at 08:00

## 2022-01-20 RX ADMIN — SIMETHICONE 80 MG: 80 TABLET, CHEWABLE ORAL at 19:08

## 2022-01-20 RX ADMIN — BUPIVACAINE HYDROCHLORIDE 1.5 ML: 7.5 INJECTION, SOLUTION EPIDURAL; RETROBULBAR at 07:32

## 2022-01-20 RX ADMIN — OXYTOCIN-SODIUM CHLORIDE 0.9% IV SOLN 30 UNIT/500ML 650 ML/HR: 30-0.9/5 SOLUTION at 07:59

## 2022-01-20 RX ADMIN — Medication 0.2 MG: at 07:32

## 2022-01-20 RX ADMIN — DOCUSATE SODIUM 100 MG: 100 CAPSULE, LIQUID FILLED ORAL at 14:05

## 2022-01-20 RX ADMIN — KETOROLAC TROMETHAMINE 30 MG: 30 INJECTION, SOLUTION INTRAMUSCULAR; INTRAVENOUS at 20:32

## 2022-01-20 RX ADMIN — SODIUM CITRATE AND CITRIC ACID MONOHYDRATE 30 ML: 500; 334 SOLUTION ORAL at 07:29

## 2022-01-20 RX ADMIN — POLYETHYLENE GLYCOL 3350 17 G: 17 POWDER, FOR SOLUTION ORAL at 14:05

## 2022-01-20 RX ADMIN — SODIUM CHLORIDE, POTASSIUM CHLORIDE, SODIUM LACTATE AND CALCIUM CHLORIDE 1000 ML: 600; 310; 30; 20 INJECTION, SOLUTION INTRAVENOUS at 07:28

## 2022-01-20 RX ADMIN — KETOROLAC TROMETHAMINE 30 MG: 30 INJECTION, SOLUTION INTRAMUSCULAR; INTRAVENOUS at 14:06

## 2022-01-20 NOTE — ANESTHESIA PREPROCEDURE EVALUATION
Anesthesia Evaluation     Patient summary reviewed and Nursing notes reviewed   no history of anesthetic complications:  NPO Solid Status: > 8 hours  NPO Liquid Status: > 8 hours           Airway   Mallampati: III  TM distance: >3 FB  Neck ROM: full  Possible difficult intubation  Dental - normal exam     Pulmonary - normal exam   (+) shortness of breath (with pregnancy),   Cardiovascular - normal exam    Patient on routine beta blocker    (+) hypertension less than 2 medications,       Neuro/Psych- negative ROS  GI/Hepatic/Renal/Endo    (+) morbid obesity, PUD,  renal disease stones, diabetes mellitus gestational,     Musculoskeletal     (+) arthralgias,   Abdominal    Substance History      OB/GYN    (+) Pregnant, pregnancy induced hypertension        Other   blood dyscrasia (iron deficiency anemia during pregnancy) anemia,     ROS/Med Hx Other: Hgb 9.9  Plt 401k                  Anesthesia Plan    ASA 3     spinal       Anesthetic plan, all risks, benefits, and alternatives have been provided, discussed and informed consent has been obtained with: patient and spouse/significant other.        CODE STATUS:    Code Status (Patient has no pulse and is not breathing): CPR (Attempt to Resuscitate)  Medical Interventions (Patient has pulse or is breathing): Full Support

## 2022-01-20 NOTE — ANESTHESIA PROCEDURE NOTES
Spinal Block      Patient reassessed immediately prior to procedure    Patient location during procedure: OB  Start Time: 1/20/2022 7:25 AM  Stop Time: 1/20/2022 7:33 AM  Indication:at surgeon's request and post-op pain management  Performed By  CRNA: Alicia Stone CRNA  Preanesthetic Checklist  Completed: patient identified, IV checked, site marked, risks and benefits discussed, surgical consent, monitors and equipment checked, pre-op evaluation and timeout performed  Spinal Block Prep:  Patient Position:sitting  Sterile Tech:cap, mask, gloves and sterile barriers  Prep:Betadine  Patient Monitoring:blood pressure monitoring and continuous pulse oximetry  Spinal Block Procedure  Approach:midline  Guidance:landmark technique and palpation technique  Location:L3-L4  Needle Type:Pencan  Needle Gauge:24 G  Placement of Spinal needle event:cerebrospinal fluid aspirated  Paresthesia: no  Fluid Appearance:clear  Medications: bupivacaine PF (MARCAINE) 0.75 % injection, 1.5 mL  Med Administered at 1/20/2022 7:32 AM   Post Assessment  Patient Tolerance:patient tolerated the procedure well with no apparent complications  Complications no

## 2022-01-21 ENCOUNTER — APPOINTMENT (OUTPATIENT)
Dept: CT IMAGING | Facility: HOSPITAL | Age: 37
End: 2022-01-21

## 2022-01-21 ENCOUNTER — ANESTHESIA (OUTPATIENT)
Dept: PERIOP | Facility: HOSPITAL | Age: 37
End: 2022-01-21

## 2022-01-21 ENCOUNTER — APPOINTMENT (OUTPATIENT)
Dept: GENERAL RADIOLOGY | Facility: HOSPITAL | Age: 37
End: 2022-01-21

## 2022-01-21 ENCOUNTER — ANESTHESIA EVENT (OUTPATIENT)
Dept: PERIOP | Facility: HOSPITAL | Age: 37
End: 2022-01-21

## 2022-01-21 PROBLEM — K66.1 HEMOPERITONEUM: Status: ACTIVE | Noted: 2021-12-31

## 2022-01-21 PROBLEM — D62 ACUTE BLOOD LOSS ANEMIA: Status: ACTIVE | Noted: 2022-01-21

## 2022-01-21 LAB
ALBUMIN SERPL-MCNC: 2.4 G/DL (ref 3.5–5.2)
ALBUMIN SERPL-MCNC: 2.9 G/DL (ref 3.5–5.2)
ALBUMIN/GLOB SERPL: 1.2 G/DL
ALBUMIN/GLOB SERPL: 1.5 G/DL
ALP SERPL-CCNC: 62 U/L (ref 39–117)
ALP SERPL-CCNC: 66 U/L (ref 39–117)
ALT SERPL W P-5'-P-CCNC: 8 U/L (ref 1–33)
ALT SERPL W P-5'-P-CCNC: 9 U/L (ref 1–33)
ANION GAP SERPL CALCULATED.3IONS-SCNC: 7 MMOL/L (ref 5–15)
ANION GAP SERPL CALCULATED.3IONS-SCNC: 8 MMOL/L (ref 5–15)
ANISOCYTOSIS BLD QL: ABNORMAL
APTT PPP: 33.5 SECONDS (ref 20–40.3)
AST SERPL-CCNC: 18 U/L (ref 1–32)
AST SERPL-CCNC: 21 U/L (ref 1–32)
BILIRUB SERPL-MCNC: 0.2 MG/DL (ref 0–1.2)
BILIRUB SERPL-MCNC: <0.2 MG/DL (ref 0–1.2)
BUN SERPL-MCNC: 14 MG/DL (ref 6–20)
BUN SERPL-MCNC: 9 MG/DL (ref 6–20)
BUN/CREAT SERPL: 12.5 (ref 7–25)
BUN/CREAT SERPL: 17.7 (ref 7–25)
CALCIUM SPEC-SCNC: 7.7 MG/DL (ref 8.6–10.5)
CALCIUM SPEC-SCNC: 7.9 MG/DL (ref 8.6–10.5)
CHLORIDE SERPL-SCNC: 104 MMOL/L (ref 98–107)
CHLORIDE SERPL-SCNC: 106 MMOL/L (ref 98–107)
CO2 SERPL-SCNC: 21 MMOL/L (ref 22–29)
CO2 SERPL-SCNC: 24 MMOL/L (ref 22–29)
CREAT SERPL-MCNC: 0.72 MG/DL (ref 0.57–1)
CREAT SERPL-MCNC: 0.79 MG/DL (ref 0.57–1)
DEPRECATED RDW RBC AUTO: 49.4 FL (ref 37–54)
DEPRECATED RDW RBC AUTO: 50.2 FL (ref 37–54)
DEPRECATED RDW RBC AUTO: 53.1 FL (ref 37–54)
ERYTHROCYTE [DISTWIDTH] IN BLOOD BY AUTOMATED COUNT: 16.2 % (ref 12.3–15.4)
ERYTHROCYTE [DISTWIDTH] IN BLOOD BY AUTOMATED COUNT: 16.2 % (ref 12.3–15.4)
ERYTHROCYTE [DISTWIDTH] IN BLOOD BY AUTOMATED COUNT: 16.5 % (ref 12.3–15.4)
FETAL BLEED: NEGATIVE
FIBRINOGEN PPP-MCNC: 499 MG/DL (ref 228–514)
GFR SERPL CREATININE-BSD FRML MDRD: 81 ML/MIN/1.73
GFR SERPL CREATININE-BSD FRML MDRD: 91 ML/MIN/1.73
GLOBULIN UR ELPH-MCNC: 1.9 GM/DL
GLOBULIN UR ELPH-MCNC: 2 GM/DL
GLUCOSE BLDC GLUCOMTR-MCNC: 104 MG/DL (ref 70–130)
GLUCOSE BLDC GLUCOMTR-MCNC: 107 MG/DL (ref 70–130)
GLUCOSE BLDC GLUCOMTR-MCNC: 113 MG/DL (ref 70–130)
GLUCOSE BLDC GLUCOMTR-MCNC: 118 MG/DL (ref 70–130)
GLUCOSE BLDC GLUCOMTR-MCNC: 122 MG/DL (ref 70–130)
GLUCOSE BLDC GLUCOMTR-MCNC: 130 MG/DL (ref 70–130)
GLUCOSE SERPL-MCNC: 103 MG/DL (ref 65–99)
GLUCOSE SERPL-MCNC: 116 MG/DL (ref 65–99)
HCT VFR BLD AUTO: 18.3 % (ref 34–46.6)
HCT VFR BLD AUTO: 21.5 % (ref 34–46.6)
HCT VFR BLD AUTO: 21.6 % (ref 34–46.6)
HGB BLD-MCNC: 6.1 G/DL (ref 12–15.9)
HGB BLD-MCNC: 6.9 G/DL (ref 12–15.9)
HGB BLD-MCNC: 7.3 G/DL (ref 12–15.9)
HYPOCHROMIA BLD QL: ABNORMAL
INR PPP: 1.17 (ref 0.8–1.2)
LYMPHOCYTES # BLD MANUAL: 2.52 10*3/MM3 (ref 0.7–3.1)
LYMPHOCYTES NFR BLD MANUAL: 2 % (ref 5–12)
MCH RBC QN AUTO: 28.1 PG (ref 26.6–33)
MCH RBC QN AUTO: 28.3 PG (ref 26.6–33)
MCH RBC QN AUTO: 28.5 PG (ref 26.6–33)
MCHC RBC AUTO-ENTMCNC: 32.1 G/DL (ref 31.5–35.7)
MCHC RBC AUTO-ENTMCNC: 33.3 G/DL (ref 31.5–35.7)
MCHC RBC AUTO-ENTMCNC: 33.8 G/DL (ref 31.5–35.7)
MCV RBC AUTO: 84.3 FL (ref 79–97)
MCV RBC AUTO: 84.4 FL (ref 79–97)
MCV RBC AUTO: 88.1 FL (ref 79–97)
MONOCYTES # BLD: 0.18 10*3/MM3 (ref 0.1–0.9)
NEUTROPHILS # BLD AUTO: 6.3 10*3/MM3 (ref 1.7–7)
NEUTROPHILS NFR BLD MANUAL: 63 % (ref 42.7–76)
NEUTS BAND NFR BLD MANUAL: 7 % (ref 0–5)
NUMBER OF DOSES: NORMAL
PLAT MORPH BLD: NORMAL
PLATELET # BLD AUTO: 304 10*3/MM3 (ref 140–450)
PLATELET # BLD AUTO: 311 10*3/MM3 (ref 140–450)
PLATELET # BLD AUTO: 316 10*3/MM3 (ref 140–450)
PMV BLD AUTO: 9.1 FL (ref 6–12)
PMV BLD AUTO: 9.4 FL (ref 6–12)
PMV BLD AUTO: 9.4 FL (ref 6–12)
POTASSIUM SERPL-SCNC: 3.7 MMOL/L (ref 3.5–5.2)
POTASSIUM SERPL-SCNC: 4.4 MMOL/L (ref 3.5–5.2)
PROT SERPL-MCNC: 4.4 G/DL (ref 6–8.5)
PROT SERPL-MCNC: 4.8 G/DL (ref 6–8.5)
PROTHROMBIN TIME: 14.8 SECONDS (ref 11.1–15.3)
RBC # BLD AUTO: 2.17 10*6/MM3 (ref 3.77–5.28)
RBC # BLD AUTO: 2.44 10*6/MM3 (ref 3.77–5.28)
RBC # BLD AUTO: 2.56 10*6/MM3 (ref 3.77–5.28)
SODIUM SERPL-SCNC: 135 MMOL/L (ref 136–145)
SODIUM SERPL-SCNC: 135 MMOL/L (ref 136–145)
TROPONIN T SERPL-MCNC: <0.01 NG/ML (ref 0–0.03)
VARIANT LYMPHS NFR BLD MANUAL: 28 % (ref 19.6–45.3)
WBC MORPH BLD: NORMAL
WBC NRBC COR # BLD: 12.29 10*3/MM3 (ref 3.4–10.8)
WBC NRBC COR # BLD: 9 10*3/MM3 (ref 3.4–10.8)
WBC NRBC COR # BLD: 9.32 10*3/MM3 (ref 3.4–10.8)

## 2022-01-21 PROCEDURE — 94640 AIRWAY INHALATION TREATMENT: CPT

## 2022-01-21 PROCEDURE — 84484 ASSAY OF TROPONIN QUANT: CPT | Performed by: OBSTETRICS & GYNECOLOGY

## 2022-01-21 PROCEDURE — 0W9G0ZZ DRAINAGE OF PERITONEAL CAVITY, OPEN APPROACH: ICD-10-PCS | Performed by: OBSTETRICS & GYNECOLOGY

## 2022-01-21 PROCEDURE — 25010000002 SUCCINYLCHOLINE PER 20 MG: Performed by: NURSE ANESTHETIST, CERTIFIED REGISTERED

## 2022-01-21 PROCEDURE — 85610 PROTHROMBIN TIME: CPT | Performed by: OBSTETRICS & GYNECOLOGY

## 2022-01-21 PROCEDURE — 49002 REOPENING OF ABDOMEN: CPT | Performed by: OBSTETRICS & GYNECOLOGY

## 2022-01-21 PROCEDURE — 0W3G0ZZ CONTROL BLEEDING IN PERITONEAL CAVITY, OPEN APPROACH: ICD-10-PCS | Performed by: OBSTETRICS & GYNECOLOGY

## 2022-01-21 PROCEDURE — 74177 CT ABD & PELVIS W/CONTRAST: CPT

## 2022-01-21 PROCEDURE — 25010000002 PHENYLEPHRINE 10 MG/ML SOLUTION: Performed by: NURSE ANESTHETIST, CERTIFIED REGISTERED

## 2022-01-21 PROCEDURE — 94799 UNLISTED PULMONARY SVC/PX: CPT

## 2022-01-21 PROCEDURE — 25010000002 ALBUMIN HUMAN 5% PER 50 ML: Performed by: NURSE ANESTHETIST, CERTIFIED REGISTERED

## 2022-01-21 PROCEDURE — 25010000002 ONDANSETRON PER 1 MG: Performed by: ANESTHESIOLOGY

## 2022-01-21 PROCEDURE — C1889 IMPLANT/INSERT DEVICE, NOC: HCPCS | Performed by: OBSTETRICS & GYNECOLOGY

## 2022-01-21 PROCEDURE — 25010000002 NEOSTIGMINE 10 MG/10ML SOLUTION: Performed by: NURSE ANESTHETIST, CERTIFIED REGISTERED

## 2022-01-21 PROCEDURE — 71046 X-RAY EXAM CHEST 2 VIEWS: CPT

## 2022-01-21 PROCEDURE — 25010000002 MIDAZOLAM PER 1 MG: Performed by: NURSE ANESTHETIST, CERTIFIED REGISTERED

## 2022-01-21 PROCEDURE — 85027 COMPLETE CBC AUTOMATED: CPT | Performed by: OBSTETRICS & GYNECOLOGY

## 2022-01-21 PROCEDURE — 25010000002 FENTANYL CITRATE (PF) 50 MCG/ML SOLUTION: Performed by: NURSE ANESTHETIST, CERTIFIED REGISTERED

## 2022-01-21 PROCEDURE — 85461 HEMOGLOBIN FETAL: CPT | Performed by: OBSTETRICS & GYNECOLOGY

## 2022-01-21 PROCEDURE — 25010000002 HYDROMORPHONE HCL-NACL 6-0.9 MG/30ML-% SOLUTION PREFILLED SYRINGE: Performed by: OBSTETRICS & GYNECOLOGY

## 2022-01-21 PROCEDURE — 36430 TRANSFUSION BLD/BLD COMPNT: CPT

## 2022-01-21 PROCEDURE — 25010000002 IOPAMIDOL 61 % SOLUTION: Performed by: OBSTETRICS & GYNECOLOGY

## 2022-01-21 PROCEDURE — 82962 GLUCOSE BLOOD TEST: CPT

## 2022-01-21 PROCEDURE — 80053 COMPREHEN METABOLIC PANEL: CPT | Performed by: OBSTETRICS & GYNECOLOGY

## 2022-01-21 PROCEDURE — 93005 ELECTROCARDIOGRAM TRACING: CPT | Performed by: OBSTETRICS & GYNECOLOGY

## 2022-01-21 PROCEDURE — 94760 N-INVAS EAR/PLS OXIMETRY 1: CPT

## 2022-01-21 PROCEDURE — 25010000002 ONDANSETRON PER 1 MG: Performed by: OBSTETRICS & GYNECOLOGY

## 2022-01-21 PROCEDURE — 25010000002 PROPOFOL 10 MG/ML EMULSION: Performed by: NURSE ANESTHETIST, CERTIFIED REGISTERED

## 2022-01-21 PROCEDURE — 49002 REOPENING OF ABDOMEN: CPT | Performed by: SPECIALIST/TECHNOLOGIST, OTHER

## 2022-01-21 PROCEDURE — 25010000002 HYDROMORPHONE 1 MG/ML SOLUTION: Performed by: NURSE ANESTHETIST, CERTIFIED REGISTERED

## 2022-01-21 PROCEDURE — 86900 BLOOD TYPING SEROLOGIC ABO: CPT

## 2022-01-21 PROCEDURE — 93010 ELECTROCARDIOGRAM REPORT: CPT | Performed by: INTERNAL MEDICINE

## 2022-01-21 PROCEDURE — P9016 RBC LEUKOCYTES REDUCED: HCPCS

## 2022-01-21 PROCEDURE — P9041 ALBUMIN (HUMAN),5%, 50ML: HCPCS | Performed by: NURSE ANESTHETIST, CERTIFIED REGISTERED

## 2022-01-21 PROCEDURE — 85384 FIBRINOGEN ACTIVITY: CPT | Performed by: OBSTETRICS & GYNECOLOGY

## 2022-01-21 PROCEDURE — 85730 THROMBOPLASTIN TIME PARTIAL: CPT | Performed by: OBSTETRICS & GYNECOLOGY

## 2022-01-21 PROCEDURE — 25010000002 CEFAZOLIN PER 500 MG

## 2022-01-21 PROCEDURE — 25010000002 DEXAMETHASONE PER 1 MG: Performed by: NURSE ANESTHETIST, CERTIFIED REGISTERED

## 2022-01-21 DEVICE — SEAL HEMO SURG ARISTA/AH ABS/PWDR 3GM: Type: IMPLANTABLE DEVICE | Site: ABDOMEN | Status: FUNCTIONAL

## 2022-01-21 RX ORDER — MIDAZOLAM HYDROCHLORIDE 1 MG/ML
INJECTION INTRAMUSCULAR; INTRAVENOUS AS NEEDED
Status: DISCONTINUED | OUTPATIENT
Start: 2022-01-21 | End: 2022-01-21 | Stop reason: SURG

## 2022-01-21 RX ORDER — DEXAMETHASONE SODIUM PHOSPHATE 4 MG/ML
INJECTION, SOLUTION INTRA-ARTICULAR; INTRALESIONAL; INTRAMUSCULAR; INTRAVENOUS; SOFT TISSUE AS NEEDED
Status: DISCONTINUED | OUTPATIENT
Start: 2022-01-21 | End: 2022-01-21 | Stop reason: SURG

## 2022-01-21 RX ORDER — MEPERIDINE HYDROCHLORIDE 25 MG/ML
12.5 INJECTION INTRAMUSCULAR; INTRAVENOUS; SUBCUTANEOUS
Status: DISCONTINUED | OUTPATIENT
Start: 2022-01-21 | End: 2022-01-21 | Stop reason: HOSPADM

## 2022-01-21 RX ORDER — ACETAMINOPHEN 325 MG/1
650 TABLET ORAL ONCE AS NEEDED
Status: DISCONTINUED | OUTPATIENT
Start: 2022-01-21 | End: 2022-01-21 | Stop reason: HOSPADM

## 2022-01-21 RX ORDER — OXYCODONE HYDROCHLORIDE 10 MG/1
10 TABLET ORAL EVERY 6 HOURS PRN
Status: DISCONTINUED | OUTPATIENT
Start: 2022-01-21 | End: 2022-01-24 | Stop reason: HOSPADM

## 2022-01-21 RX ORDER — ALBUMIN, HUMAN INJ 5% 5 %
SOLUTION INTRAVENOUS CONTINUOUS PRN
Status: DISCONTINUED | OUTPATIENT
Start: 2022-01-21 | End: 2022-01-21 | Stop reason: SURG

## 2022-01-21 RX ORDER — HYDROMORPHONE HCL IN 0.9% NACL 0.2 MG/ML
PLASTIC BAG, INJECTION (ML) INTRAVENOUS CONTINUOUS
Status: DISCONTINUED | OUTPATIENT
Start: 2022-01-21 | End: 2022-01-24 | Stop reason: HOSPADM

## 2022-01-21 RX ORDER — NALOXONE HCL 0.4 MG/ML
0.1 VIAL (ML) INJECTION
Status: DISCONTINUED | OUTPATIENT
Start: 2022-01-21 | End: 2022-01-24 | Stop reason: HOSPADM

## 2022-01-21 RX ORDER — FUROSEMIDE 10 MG/ML
20 INJECTION INTRAMUSCULAR; INTRAVENOUS ONCE
Status: COMPLETED | OUTPATIENT
Start: 2022-01-21 | End: 2022-01-22

## 2022-01-21 RX ORDER — PHENYLEPHRINE HYDROCHLORIDE 10 MG/ML
INJECTION INTRAVENOUS AS NEEDED
Status: DISCONTINUED | OUTPATIENT
Start: 2022-01-21 | End: 2022-01-21 | Stop reason: SURG

## 2022-01-21 RX ORDER — NALOXONE HCL 0.4 MG/ML
0.4 VIAL (ML) INJECTION AS NEEDED
Status: DISCONTINUED | OUTPATIENT
Start: 2022-01-21 | End: 2022-01-21 | Stop reason: HOSPADM

## 2022-01-21 RX ORDER — ROCURONIUM BROMIDE 10 MG/ML
INJECTION, SOLUTION INTRAVENOUS AS NEEDED
Status: DISCONTINUED | OUTPATIENT
Start: 2022-01-21 | End: 2022-01-21 | Stop reason: SURG

## 2022-01-21 RX ORDER — OXYCODONE HYDROCHLORIDE 5 MG/1
5 TABLET ORAL EVERY 6 HOURS PRN
Status: DISCONTINUED | OUTPATIENT
Start: 2022-01-21 | End: 2022-01-24 | Stop reason: HOSPADM

## 2022-01-21 RX ORDER — LIDOCAINE HYDROCHLORIDE 20 MG/ML
INJECTION, SOLUTION INFILTRATION; PERINEURAL AS NEEDED
Status: DISCONTINUED | OUTPATIENT
Start: 2022-01-21 | End: 2022-01-21 | Stop reason: SURG

## 2022-01-21 RX ORDER — PROMETHAZINE HYDROCHLORIDE 25 MG/1
25 TABLET ORAL ONCE AS NEEDED
Status: DISCONTINUED | OUTPATIENT
Start: 2022-01-21 | End: 2022-01-21 | Stop reason: HOSPADM

## 2022-01-21 RX ORDER — IPRATROPIUM BROMIDE AND ALBUTEROL SULFATE 2.5; .5 MG/3ML; MG/3ML
3 SOLUTION RESPIRATORY (INHALATION)
Status: DISCONTINUED | OUTPATIENT
Start: 2022-01-21 | End: 2022-01-23

## 2022-01-21 RX ORDER — SODIUM CHLORIDE 9 MG/ML
INJECTION, SOLUTION INTRAVENOUS CONTINUOUS PRN
Status: DISCONTINUED | OUTPATIENT
Start: 2022-01-21 | End: 2022-01-21 | Stop reason: SURG

## 2022-01-21 RX ORDER — PROMETHAZINE HYDROCHLORIDE 25 MG/1
25 SUPPOSITORY RECTAL ONCE AS NEEDED
Status: DISCONTINUED | OUTPATIENT
Start: 2022-01-21 | End: 2022-01-21 | Stop reason: HOSPADM

## 2022-01-21 RX ORDER — EPHEDRINE SULFATE 50 MG/ML
5 INJECTION, SOLUTION INTRAVENOUS ONCE AS NEEDED
Status: DISCONTINUED | OUTPATIENT
Start: 2022-01-21 | End: 2022-01-21 | Stop reason: HOSPADM

## 2022-01-21 RX ORDER — SUCCINYLCHOLINE CHLORIDE 20 MG/ML
INJECTION INTRAMUSCULAR; INTRAVENOUS AS NEEDED
Status: DISCONTINUED | OUTPATIENT
Start: 2022-01-21 | End: 2022-01-21 | Stop reason: SURG

## 2022-01-21 RX ORDER — BUPIVACAINE HCL/0.9 % NACL/PF 0.1 %
2 PLASTIC BAG, INJECTION (ML) EPIDURAL ONCE
Status: DISCONTINUED | OUTPATIENT
Start: 2022-01-21 | End: 2022-01-21

## 2022-01-21 RX ORDER — FENTANYL CITRATE 50 UG/ML
INJECTION, SOLUTION INTRAMUSCULAR; INTRAVENOUS AS NEEDED
Status: DISCONTINUED | OUTPATIENT
Start: 2022-01-21 | End: 2022-01-21 | Stop reason: SURG

## 2022-01-21 RX ORDER — PROPOFOL 10 MG/ML
VIAL (ML) INTRAVENOUS AS NEEDED
Status: DISCONTINUED | OUTPATIENT
Start: 2022-01-21 | End: 2022-01-21 | Stop reason: SURG

## 2022-01-21 RX ORDER — ONDANSETRON 2 MG/ML
4 INJECTION INTRAMUSCULAR; INTRAVENOUS ONCE
Status: COMPLETED | OUTPATIENT
Start: 2022-01-21 | End: 2022-01-21

## 2022-01-21 RX ORDER — ACETAMINOPHEN 650 MG/1
650 SUPPOSITORY RECTAL ONCE AS NEEDED
Status: DISCONTINUED | OUTPATIENT
Start: 2022-01-21 | End: 2022-01-21 | Stop reason: HOSPADM

## 2022-01-21 RX ORDER — CEFAZOLIN SODIUM IN 0.9 % NACL 3 G/100 ML
3 INTRAVENOUS SOLUTION, PIGGYBACK (ML) INTRAVENOUS ONCE
Status: COMPLETED | OUTPATIENT
Start: 2022-01-21 | End: 2022-01-21

## 2022-01-21 RX ORDER — DIPHENHYDRAMINE HYDROCHLORIDE 50 MG/ML
25 INJECTION INTRAMUSCULAR; INTRAVENOUS EVERY 6 HOURS PRN
Status: DISCONTINUED | OUTPATIENT
Start: 2022-01-21 | End: 2022-01-24 | Stop reason: HOSPADM

## 2022-01-21 RX ORDER — NEOSTIGMINE METHYLSULFATE 1 MG/ML
INJECTION, SOLUTION INTRAVENOUS AS NEEDED
Status: DISCONTINUED | OUTPATIENT
Start: 2022-01-21 | End: 2022-01-21 | Stop reason: SURG

## 2022-01-21 RX ORDER — FLUMAZENIL 0.1 MG/ML
0.2 INJECTION INTRAVENOUS AS NEEDED
Status: DISCONTINUED | OUTPATIENT
Start: 2022-01-21 | End: 2022-01-21 | Stop reason: HOSPADM

## 2022-01-21 RX ADMIN — FENTANYL CITRATE 100 MCG: 50 INJECTION INTRAMUSCULAR; INTRAVENOUS at 04:44

## 2022-01-21 RX ADMIN — LIDOCAINE HYDROCHLORIDE 20 MG: 20 INJECTION, SOLUTION INFILTRATION; PERINEURAL at 06:25

## 2022-01-21 RX ADMIN — PHENOL 2 SPRAY: 1.5 LIQUID ORAL at 16:55

## 2022-01-21 RX ADMIN — GLYCOPYRROLATE 0.6 MG: 0.2 INJECTION, SOLUTION INTRAMUSCULAR; INTRAVITREAL at 06:24

## 2022-01-21 RX ADMIN — ALBUMIN HUMAN: 0.05 INJECTION, SOLUTION INTRAVENOUS at 05:38

## 2022-01-21 RX ADMIN — ONDANSETRON 4 MG: 2 INJECTION INTRAMUSCULAR; INTRAVENOUS at 08:15

## 2022-01-21 RX ADMIN — PHENOL 2 SPRAY: 1.5 LIQUID ORAL at 23:04

## 2022-01-21 RX ADMIN — IOPAMIDOL 90 ML: 612 INJECTION, SOLUTION INTRAVENOUS at 02:08

## 2022-01-21 RX ADMIN — CETIRIZINE HYDROCHLORIDE 10 MG: 10 TABLET, FILM COATED ORAL at 09:59

## 2022-01-21 RX ADMIN — ONDANSETRON 4 MG: 2 INJECTION INTRAMUSCULAR; INTRAVENOUS at 06:21

## 2022-01-21 RX ADMIN — FENTANYL CITRATE 50 MCG: 50 INJECTION INTRAMUSCULAR; INTRAVENOUS at 06:32

## 2022-01-21 RX ADMIN — ROCURONIUM BROMIDE 5 MG: 10 INJECTION INTRAVENOUS at 04:45

## 2022-01-21 RX ADMIN — ALBUMIN HUMAN: 0.05 INJECTION, SOLUTION INTRAVENOUS at 05:19

## 2022-01-21 RX ADMIN — SODIUM CHLORIDE, POTASSIUM CHLORIDE, SODIUM LACTATE AND CALCIUM CHLORIDE 125 ML/HR: 600; 310; 30; 20 INJECTION, SOLUTION INTRAVENOUS at 08:20

## 2022-01-21 RX ADMIN — SODIUM CHLORIDE, POTASSIUM CHLORIDE, SODIUM LACTATE AND CALCIUM CHLORIDE 1000 ML: 600; 310; 30; 20 INJECTION, SOLUTION INTRAVENOUS at 02:15

## 2022-01-21 RX ADMIN — ACETAMINOPHEN 1000 MG: 500 TABLET ORAL at 13:00

## 2022-01-21 RX ADMIN — SODIUM CHLORIDE: 9 INJECTION, SOLUTION INTRAVENOUS at 04:59

## 2022-01-21 RX ADMIN — ACETAMINOPHEN 1000 MG: 500 TABLET ORAL at 20:58

## 2022-01-21 RX ADMIN — MIDAZOLAM HYDROCHLORIDE 2 MG: 1 INJECTION, SOLUTION INTRAMUSCULAR; INTRAVENOUS at 04:32

## 2022-01-21 RX ADMIN — HYDROMORPHONE HYDROCHLORIDE: 2 INJECTION, SOLUTION INTRAMUSCULAR; INTRAVENOUS; SUBCUTANEOUS at 08:33

## 2022-01-21 RX ADMIN — LIDOCAINE HYDROCHLORIDE 80 MG: 20 INJECTION, SOLUTION INFILTRATION; PERINEURAL at 04:45

## 2022-01-21 RX ADMIN — HYDROMORPHONE HYDROCHLORIDE 0.5 MG: 1 INJECTION, SOLUTION INTRAMUSCULAR; INTRAVENOUS; SUBCUTANEOUS at 08:13

## 2022-01-21 RX ADMIN — ROCURONIUM BROMIDE 25 MG: 10 INJECTION INTRAVENOUS at 05:00

## 2022-01-21 RX ADMIN — PHENYLEPHRINE HYDROCHLORIDE 100 MCG: 10 INJECTION INTRAVENOUS at 05:01

## 2022-01-21 RX ADMIN — PROPOFOL 150 MG: 10 INJECTION, EMULSION INTRAVENOUS at 04:45

## 2022-01-21 RX ADMIN — DOCUSATE SODIUM 100 MG: 100 CAPSULE, LIQUID FILLED ORAL at 20:58

## 2022-01-21 RX ADMIN — DOCUSATE SODIUM 100 MG: 100 CAPSULE, LIQUID FILLED ORAL at 09:59

## 2022-01-21 RX ADMIN — ROCURONIUM BROMIDE 20 MG: 10 INJECTION INTRAVENOUS at 05:16

## 2022-01-21 RX ADMIN — PHENYLEPHRINE HYDROCHLORIDE 100 MCG: 10 INJECTION INTRAVENOUS at 05:10

## 2022-01-21 RX ADMIN — SIMETHICONE 80 MG: 80 TABLET, CHEWABLE ORAL at 20:58

## 2022-01-21 RX ADMIN — POLYETHYLENE GLYCOL 3350 17 G: 17 POWDER, FOR SOLUTION ORAL at 09:59

## 2022-01-21 RX ADMIN — SCOLOPAMINE TRANSDERMAL SYSTEM 1 PATCH: 1 PATCH, EXTENDED RELEASE TRANSDERMAL at 00:18

## 2022-01-21 RX ADMIN — FENTANYL CITRATE 50 MCG: 50 INJECTION INTRAMUSCULAR; INTRAVENOUS at 06:57

## 2022-01-21 RX ADMIN — PHENYLEPHRINE HYDROCHLORIDE 100 MCG: 10 INJECTION INTRAVENOUS at 06:36

## 2022-01-21 RX ADMIN — SODIUM CHLORIDE, POTASSIUM CHLORIDE, SODIUM LACTATE AND CALCIUM CHLORIDE 125 ML/HR: 600; 310; 30; 20 INJECTION, SOLUTION INTRAVENOUS at 16:53

## 2022-01-21 RX ADMIN — Medication 3 G: at 04:41

## 2022-01-21 RX ADMIN — IPRATROPIUM BROMIDE AND ALBUTEROL SULFATE 3 ML: 2.5; .5 SOLUTION RESPIRATORY (INHALATION) at 22:42

## 2022-01-21 RX ADMIN — PHENYLEPHRINE HYDROCHLORIDE 100 MCG: 10 INJECTION INTRAVENOUS at 06:39

## 2022-01-21 RX ADMIN — SIMETHICONE 80 MG: 80 TABLET, CHEWABLE ORAL at 17:11

## 2022-01-21 RX ADMIN — ROCURONIUM BROMIDE 10 MG: 10 INJECTION INTRAVENOUS at 06:10

## 2022-01-21 RX ADMIN — OXYCODONE HYDROCHLORIDE 10 MG: 10 TABLET ORAL at 22:15

## 2022-01-21 RX ADMIN — Medication 1 TABLET: at 09:59

## 2022-01-21 RX ADMIN — DEXAMETHASONE SODIUM PHOSPHATE 4 MG: 4 INJECTION, SOLUTION INTRAMUSCULAR; INTRAVENOUS at 04:58

## 2022-01-21 RX ADMIN — ROCURONIUM BROMIDE 10 MG: 10 INJECTION INTRAVENOUS at 05:50

## 2022-01-21 RX ADMIN — IBUPROFEN 800 MG: 800 TABLET, FILM COATED ORAL at 16:01

## 2022-01-21 RX ADMIN — PHENYLEPHRINE HYDROCHLORIDE 100 MCG: 10 INJECTION INTRAVENOUS at 05:56

## 2022-01-21 RX ADMIN — ACETAMINOPHEN 1000 MG: 500 TABLET ORAL at 00:16

## 2022-01-21 RX ADMIN — NEOSTIGMINE METHYLSULFATE 4 MG: 0.5 INJECTION INTRAVENOUS at 06:24

## 2022-01-21 RX ADMIN — SUCCINYLCHOLINE CHLORIDE 160 MG: 20 INJECTION, SOLUTION INTRAMUSCULAR; INTRAVENOUS at 04:45

## 2022-01-21 RX ADMIN — OXYCODONE HYDROCHLORIDE 10 MG: 10 TABLET ORAL at 00:49

## 2022-01-21 RX ADMIN — SIMETHICONE 80 MG: 80 TABLET, CHEWABLE ORAL at 08:59

## 2022-01-21 RX ADMIN — IBUPROFEN 800 MG: 800 TABLET, FILM COATED ORAL at 23:02

## 2022-01-21 NOTE — ANESTHESIA POSTPROCEDURE EVALUATION
Patient: Donna Linder    Procedure Summary     Date: 01/21/22 Room / Location: [facility] OR 09 / [facility] OR    Anesthesia Start: 0436 Anesthesia Stop: 0713    Procedure: Exploratory laparotomy and control of bleed (N/A Abdomen) Diagnosis:       Hemoperitoneum      (Hemoperitoneum [K66.1])    Surgeons: Audrey Cummins MD Provider: Guillermo Pastor CRNA    Anesthesia Type: general ASA Status: 3 - Emergent          Anesthesia Type: general    Vitals  No vitals data found for the desired time range.          Post Anesthesia Care and Evaluation    Patient location during evaluation: PACU  Patient participation: waiting for patient participation  Level of consciousness: sleepy but conscious  Pain score: 0  Pain management: adequate  Airway patency: patent  Anesthetic complications: No anesthetic complications  PONV Status: none  Cardiovascular status: acceptable  Respiratory status: acceptable  Hydration status: acceptable    Comments: --------------------            01/21/22               0400     --------------------   BP:       104/53     Pulse:      83       Resp:       18       Temp:         97.3       SpO2:      97%      --------------------

## 2022-01-21 NOTE — ANESTHESIA PROCEDURE NOTES
Peripheral IV    Line placed for Fluids/Medication Admin.  Performed By   CRNA: Guillermo Pastor CRNA  Preanesthetic Checklist  Completed: patient identified, IV checked, site marked, risks and benefits discussed, surgical consent, monitors and equipment checked, pre-op evaluation and timeout performed  Peripheral IV Prep   Patient position: supine   Prep: ChloraPrep  Patient monitoring: heart rate, cardiac monitor and continuous pulse ox  Peripheral IV Procedure   Laterality:left  Location:  Wrist  Catheter size: 18 G         Post Assessment   Dressing Type: tape and transparent.    IV Dressing/Site: clean, dry and intact

## 2022-01-21 NOTE — ANESTHESIA PROCEDURE NOTES
Airway  Urgency: elective    Date/Time: 1/21/2022 4:45 AM  Airway not difficult    General Information and Staff    Patient location during procedure: OR    Indications and Patient Condition  Indications for airway management: airway protection    Preoxygenated: yes  MILS maintained throughout  Mask difficulty assessment: 0 - not attempted    Final Airway Details  Final airway type: endotracheal airway      Successful airway: ETT  Cuffed: yes   Successful intubation technique: video laryngoscopy  Facilitating devices/methods: intubating stylet  Endotracheal tube insertion site: oral  Blade: Gan  Blade size: 3  ETT size (mm): 6.5  Cormack-Lehane Classification: grade IIa - partial view of glottis  Placement verified by: chest auscultation and capnometry   Measured from: lips  ETT/EBT  to lips (cm): 20  Number of attempts at approach: 1  Assessment: lips, teeth, and gum same as pre-op and atraumatic intubation

## 2022-01-21 NOTE — ANESTHESIA PREPROCEDURE EVALUATION
Anesthesia Evaluation     Patient summary reviewed and Nursing notes reviewed   NPO Solid Status: > 4 hours  NPO Liquid Status: > 8 hours           Airway   Mallampati: II  No difficulty expected  Dental - normal exam     Pulmonary - normal exam   (+) shortness of breath,   Cardiovascular     Rhythm: regular  Rate: normal    (+) hypertension,       Neuro/Psych  (+) psychiatric history Depression,     GI/Hepatic/Renal/Endo    (+) obesity, morbid obesity, PUD,      Musculoskeletal     Abdominal    Substance History      OB/GYN    (+) pregnancy induced hypertension        Other - negative ROS               Anesthesia Evaluation     Patient summary reviewed and Nursing notes reviewed   no history of anesthetic complications:  NPO Solid Status: > 8 hours  NPO Liquid Status: > 8 hours           Airway   Mallampati: III  TM distance: >3 FB  Neck ROM: full  Possible difficult intubation  Dental - normal exam     Pulmonary - normal exam   (+) shortness of breath (with pregnancy),   Cardiovascular - normal exam    Patient on routine beta blocker    (+) hypertension less than 2 medications,       Neuro/Psych- negative ROS  GI/Hepatic/Renal/Endo    (+) morbid obesity, PUD,  renal disease stones, diabetes mellitus gestational,     Musculoskeletal     (+) arthralgias,   Abdominal    Substance History      OB/GYN    (+) Pregnant, pregnancy induced hypertension        Other   blood dyscrasia (iron deficiency anemia during pregnancy) anemia,     ROS/Med Hx Other: Hgb 9.9  Plt 401k                  Anesthesia Plan    ASA 3     spinal       Anesthetic plan, all risks, benefits, and alternatives have been provided, discussed and informed consent has been obtained with: patient and spouse/significant other.        CODE STATUS:              Anesthesia Plan    ASA 3 - emergent     general     intravenous induction     Anesthetic plan, all risks, benefits, and alternatives have been provided, discussed and informed consent has been  obtained with: patient.    Plan discussed with CRNA.        CODE STATUS:    Code Status (Patient has no pulse and is not breathing): CPR (Attempt to Resuscitate)  Medical Interventions (Patient has pulse or is breathing): Full

## 2022-01-22 ENCOUNTER — APPOINTMENT (OUTPATIENT)
Dept: CT IMAGING | Facility: HOSPITAL | Age: 37
End: 2022-01-22

## 2022-01-22 LAB
BH BB BLOOD EXPIRATION DATE: NORMAL
BH BB BLOOD TYPE BARCODE: 9500
BH BB DISPENSE STATUS: NORMAL
BH BB PRODUCT CODE: NORMAL
BH BB UNIT NUMBER: NORMAL
CROSSMATCH INTERPRETATION: NORMAL
DEPRECATED RDW RBC AUTO: 48.7 FL (ref 37–54)
DEPRECATED RDW RBC AUTO: 49.1 FL (ref 37–54)
ERYTHROCYTE [DISTWIDTH] IN BLOOD BY AUTOMATED COUNT: 15.7 % (ref 12.3–15.4)
ERYTHROCYTE [DISTWIDTH] IN BLOOD BY AUTOMATED COUNT: 15.9 % (ref 12.3–15.4)
FLUAV RNA RESP QL NAA+PROBE: NOT DETECTED
FLUBV RNA RESP QL NAA+PROBE: NOT DETECTED
GLUCOSE BLDC GLUCOMTR-MCNC: 112 MG/DL (ref 70–130)
GLUCOSE BLDC GLUCOMTR-MCNC: 125 MG/DL (ref 70–130)
HCT VFR BLD AUTO: 23.1 % (ref 34–46.6)
HCT VFR BLD AUTO: 24.4 % (ref 34–46.6)
HGB BLD-MCNC: 8 G/DL (ref 12–15.9)
HGB BLD-MCNC: 8.2 G/DL (ref 12–15.9)
MCH RBC QN AUTO: 28.9 PG (ref 26.6–33)
MCH RBC QN AUTO: 29.5 PG (ref 26.6–33)
MCHC RBC AUTO-ENTMCNC: 33.6 G/DL (ref 31.5–35.7)
MCHC RBC AUTO-ENTMCNC: 34.6 G/DL (ref 31.5–35.7)
MCV RBC AUTO: 85.2 FL (ref 79–97)
MCV RBC AUTO: 85.9 FL (ref 79–97)
PLATELET # BLD AUTO: 315 10*3/MM3 (ref 140–450)
PLATELET # BLD AUTO: 318 10*3/MM3 (ref 140–450)
PMV BLD AUTO: 9 FL (ref 6–12)
PMV BLD AUTO: 9.2 FL (ref 6–12)
RBC # BLD AUTO: 2.71 10*6/MM3 (ref 3.77–5.28)
RBC # BLD AUTO: 2.84 10*6/MM3 (ref 3.77–5.28)
SARS-COV-2 RNA RESP QL NAA+PROBE: DETECTED
UNIT  ABO: NORMAL
UNIT  RH: NORMAL
WBC NRBC COR # BLD: 12.3 10*3/MM3 (ref 3.4–10.8)
WBC NRBC COR # BLD: 13.09 10*3/MM3 (ref 3.4–10.8)

## 2022-01-22 PROCEDURE — 85027 COMPLETE CBC AUTOMATED: CPT | Performed by: STUDENT IN AN ORGANIZED HEALTH CARE EDUCATION/TRAINING PROGRAM

## 2022-01-22 PROCEDURE — 94799 UNLISTED PULMONARY SVC/PX: CPT

## 2022-01-22 PROCEDURE — 87636 SARSCOV2 & INF A&B AMP PRB: CPT | Performed by: OBSTETRICS & GYNECOLOGY

## 2022-01-22 PROCEDURE — 0 IOPAMIDOL PER 1 ML: Performed by: OBSTETRICS & GYNECOLOGY

## 2022-01-22 PROCEDURE — 25010000002 RHO D IMMUNE GLOBULIN 1500 UNITS SOLUTION PREFILLED SYRINGE: Performed by: OBSTETRICS & GYNECOLOGY

## 2022-01-22 PROCEDURE — P9016 RBC LEUKOCYTES REDUCED: HCPCS

## 2022-01-22 PROCEDURE — 71275 CT ANGIOGRAPHY CHEST: CPT

## 2022-01-22 PROCEDURE — 25010000002 HYDROMORPHONE 1 MG/ML SOLUTION: Performed by: STUDENT IN AN ORGANIZED HEALTH CARE EDUCATION/TRAINING PROGRAM

## 2022-01-22 PROCEDURE — 0503F POSTPARTUM CARE VISIT: CPT | Performed by: STUDENT IN AN ORGANIZED HEALTH CARE EDUCATION/TRAINING PROGRAM

## 2022-01-22 PROCEDURE — 25010000002 FUROSEMIDE PER 20 MG: Performed by: STUDENT IN AN ORGANIZED HEALTH CARE EDUCATION/TRAINING PROGRAM

## 2022-01-22 PROCEDURE — 82962 GLUCOSE BLOOD TEST: CPT

## 2022-01-22 PROCEDURE — 36430 TRANSFUSION BLD/BLD COMPNT: CPT

## 2022-01-22 PROCEDURE — 63710000001 INSULIN ASPART PER 5 UNITS: Performed by: OBSTETRICS & GYNECOLOGY

## 2022-01-22 PROCEDURE — 94760 N-INVAS EAR/PLS OXIMETRY 1: CPT

## 2022-01-22 PROCEDURE — 86900 BLOOD TYPING SEROLOGIC ABO: CPT

## 2022-01-22 RX ORDER — POLYETHYLENE GLYCOL 3350 17 G/17G
17 POWDER, FOR SOLUTION ORAL 2 TIMES DAILY
Status: DISCONTINUED | OUTPATIENT
Start: 2022-01-22 | End: 2022-01-24 | Stop reason: HOSPADM

## 2022-01-22 RX ORDER — FUROSEMIDE 10 MG/ML
20 INJECTION INTRAMUSCULAR; INTRAVENOUS ONCE
Status: COMPLETED | OUTPATIENT
Start: 2022-01-22 | End: 2022-01-22

## 2022-01-22 RX ADMIN — IBUPROFEN 800 MG: 800 TABLET, FILM COATED ORAL at 13:01

## 2022-01-22 RX ADMIN — SIMETHICONE 80 MG: 80 TABLET, CHEWABLE ORAL at 19:21

## 2022-01-22 RX ADMIN — IPRATROPIUM BROMIDE AND ALBUTEROL SULFATE 3 ML: 2.5; .5 SOLUTION RESPIRATORY (INHALATION) at 21:48

## 2022-01-22 RX ADMIN — POLYETHYLENE GLYCOL 3350 17 G: 17 POWDER, FOR SOLUTION ORAL at 08:06

## 2022-01-22 RX ADMIN — FUROSEMIDE 20 MG: 10 INJECTION, SOLUTION INTRAMUSCULAR; INTRAVENOUS at 10:11

## 2022-01-22 RX ADMIN — ACETAMINOPHEN 1000 MG: 500 TABLET ORAL at 19:21

## 2022-01-22 RX ADMIN — IBUPROFEN 800 MG: 800 TABLET, FILM COATED ORAL at 06:51

## 2022-01-22 RX ADMIN — OXYCODONE HYDROCHLORIDE 10 MG: 10 TABLET ORAL at 13:01

## 2022-01-22 RX ADMIN — POLYETHYLENE GLYCOL 3350 17 G: 17 POWDER, FOR SOLUTION ORAL at 22:20

## 2022-01-22 RX ADMIN — IOPAMIDOL 65 ML: 755 INJECTION, SOLUTION INTRAVENOUS at 09:57

## 2022-01-22 RX ADMIN — RHO(D) IMMUNE GLOBULIN (HUMAN) 1500 UNITS: 1500 SOLUTION INTRAMUSCULAR at 22:22

## 2022-01-22 RX ADMIN — IBUPROFEN 800 MG: 800 TABLET, FILM COATED ORAL at 22:20

## 2022-01-22 RX ADMIN — DOCUSATE SODIUM 100 MG: 100 CAPSULE, LIQUID FILLED ORAL at 08:05

## 2022-01-22 RX ADMIN — SIMETHICONE 80 MG: 80 TABLET, CHEWABLE ORAL at 22:39

## 2022-01-22 RX ADMIN — INSULIN ASPART 10 UNITS: 100 INJECTION, SOLUTION INTRAVENOUS; SUBCUTANEOUS at 11:55

## 2022-01-22 RX ADMIN — DOCUSATE SODIUM 100 MG: 100 CAPSULE, LIQUID FILLED ORAL at 22:20

## 2022-01-22 RX ADMIN — FUROSEMIDE 20 MG: 10 INJECTION, SOLUTION INTRAMUSCULAR; INTRAVENOUS at 00:55

## 2022-01-22 RX ADMIN — CETIRIZINE HYDROCHLORIDE 10 MG: 10 TABLET, FILM COATED ORAL at 08:05

## 2022-01-22 RX ADMIN — PHENOL 2 SPRAY: 1.5 LIQUID ORAL at 03:46

## 2022-01-22 RX ADMIN — IPRATROPIUM BROMIDE AND ALBUTEROL SULFATE 3 ML: 2.5; .5 SOLUTION RESPIRATORY (INHALATION) at 08:37

## 2022-01-22 RX ADMIN — ACETAMINOPHEN 1000 MG: 500 TABLET ORAL at 13:01

## 2022-01-22 RX ADMIN — SIMETHICONE 80 MG: 80 TABLET, CHEWABLE ORAL at 13:01

## 2022-01-22 RX ADMIN — IPRATROPIUM BROMIDE AND ALBUTEROL SULFATE 3 ML: 2.5; .5 SOLUTION RESPIRATORY (INHALATION) at 12:47

## 2022-01-22 RX ADMIN — IPRATROPIUM BROMIDE AND ALBUTEROL SULFATE 3 ML: 2.5; .5 SOLUTION RESPIRATORY (INHALATION) at 16:36

## 2022-01-22 RX ADMIN — OXYCODONE HYDROCHLORIDE 10 MG: 10 TABLET ORAL at 08:05

## 2022-01-22 RX ADMIN — HYDROMORPHONE HYDROCHLORIDE 0.5 MG: 1 INJECTION, SOLUTION INTRAMUSCULAR; INTRAVENOUS; SUBCUTANEOUS at 02:52

## 2022-01-22 RX ADMIN — ACETAMINOPHEN 1000 MG: 500 TABLET ORAL at 08:05

## 2022-01-22 RX ADMIN — ACETAMINOPHEN 1000 MG: 500 TABLET ORAL at 00:56

## 2022-01-22 RX ADMIN — SIMETHICONE 80 MG: 80 TABLET, CHEWABLE ORAL at 08:10

## 2022-01-23 LAB
ANION GAP SERPL CALCULATED.3IONS-SCNC: 12 MMOL/L (ref 5–15)
BH BB BLOOD EXPIRATION DATE: NORMAL
BH BB BLOOD EXPIRATION DATE: NORMAL
BH BB BLOOD TYPE BARCODE: 9500
BH BB BLOOD TYPE BARCODE: 9500
BH BB DISPENSE STATUS: NORMAL
BH BB DISPENSE STATUS: NORMAL
BH BB PRODUCT CODE: NORMAL
BH BB PRODUCT CODE: NORMAL
BH BB UNIT NUMBER: NORMAL
BH BB UNIT NUMBER: NORMAL
BUN SERPL-MCNC: 12 MG/DL (ref 6–20)
BUN/CREAT SERPL: 19 (ref 7–25)
CALCIUM SPEC-SCNC: 8.1 MG/DL (ref 8.6–10.5)
CHLORIDE SERPL-SCNC: 103 MMOL/L (ref 98–107)
CO2 SERPL-SCNC: 23 MMOL/L (ref 22–29)
CREAT SERPL-MCNC: 0.63 MG/DL (ref 0.57–1)
CROSSMATCH INTERPRETATION: NORMAL
CROSSMATCH INTERPRETATION: NORMAL
DEPRECATED RDW RBC AUTO: 49 FL (ref 37–54)
ERYTHROCYTE [DISTWIDTH] IN BLOOD BY AUTOMATED COUNT: 15.8 % (ref 12.3–15.4)
GFR SERPL CREATININE-BSD FRML MDRD: 106 ML/MIN/1.73
GLUCOSE BLDC GLUCOMTR-MCNC: 102 MG/DL (ref 70–130)
GLUCOSE BLDC GLUCOMTR-MCNC: 107 MG/DL (ref 70–130)
GLUCOSE BLDC GLUCOMTR-MCNC: 78 MG/DL (ref 70–130)
GLUCOSE SERPL-MCNC: 106 MG/DL (ref 65–99)
HCT VFR BLD AUTO: 23.1 % (ref 34–46.6)
HGB BLD-MCNC: 7.8 G/DL (ref 12–15.9)
HOLD SPECIMEN: NORMAL
MCH RBC QN AUTO: 28.9 PG (ref 26.6–33)
MCHC RBC AUTO-ENTMCNC: 33.8 G/DL (ref 31.5–35.7)
MCV RBC AUTO: 85.6 FL (ref 79–97)
PLATELET # BLD AUTO: 338 10*3/MM3 (ref 140–450)
PMV BLD AUTO: 9 FL (ref 6–12)
POTASSIUM SERPL-SCNC: 3.5 MMOL/L (ref 3.5–5.2)
RBC # BLD AUTO: 2.7 10*6/MM3 (ref 3.77–5.28)
SODIUM SERPL-SCNC: 138 MMOL/L (ref 136–145)
UNIT  ABO: NORMAL
UNIT  ABO: NORMAL
UNIT  RH: NORMAL
UNIT  RH: NORMAL
WBC NRBC COR # BLD: 13.62 10*3/MM3 (ref 3.4–10.8)

## 2022-01-23 PROCEDURE — 80048 BASIC METABOLIC PNL TOTAL CA: CPT | Performed by: STUDENT IN AN ORGANIZED HEALTH CARE EDUCATION/TRAINING PROGRAM

## 2022-01-23 PROCEDURE — 63710000001 INSULIN DETEMIR PER 5 UNITS: Performed by: OBSTETRICS & GYNECOLOGY

## 2022-01-23 PROCEDURE — 85027 COMPLETE CBC AUTOMATED: CPT | Performed by: STUDENT IN AN ORGANIZED HEALTH CARE EDUCATION/TRAINING PROGRAM

## 2022-01-23 PROCEDURE — 0503F POSTPARTUM CARE VISIT: CPT | Performed by: STUDENT IN AN ORGANIZED HEALTH CARE EDUCATION/TRAINING PROGRAM

## 2022-01-23 PROCEDURE — 63710000001 ONDANSETRON PER 8 MG: Performed by: OBSTETRICS & GYNECOLOGY

## 2022-01-23 PROCEDURE — 25010000002 ENOXAPARIN PER 10 MG: Performed by: STUDENT IN AN ORGANIZED HEALTH CARE EDUCATION/TRAINING PROGRAM

## 2022-01-23 RX ORDER — DEXTROSE MONOHYDRATE 25 G/50ML
25 INJECTION, SOLUTION INTRAVENOUS
Status: DISCONTINUED | OUTPATIENT
Start: 2022-01-23 | End: 2022-01-24 | Stop reason: HOSPADM

## 2022-01-23 RX ORDER — ALBUTEROL SULFATE 90 UG/1
2 AEROSOL, METERED RESPIRATORY (INHALATION)
Status: DISCONTINUED | OUTPATIENT
Start: 2022-01-23 | End: 2022-01-24 | Stop reason: HOSPADM

## 2022-01-23 RX ORDER — GUAIFENESIN 600 MG/1
600 TABLET, EXTENDED RELEASE ORAL EVERY 12 HOURS SCHEDULED
Status: DISCONTINUED | OUTPATIENT
Start: 2022-01-23 | End: 2022-01-24 | Stop reason: HOSPADM

## 2022-01-23 RX ORDER — BUDESONIDE AND FORMOTEROL FUMARATE DIHYDRATE 80; 4.5 UG/1; UG/1
2 AEROSOL RESPIRATORY (INHALATION)
Status: DISCONTINUED | OUTPATIENT
Start: 2022-01-23 | End: 2022-01-24 | Stop reason: HOSPADM

## 2022-01-23 RX ORDER — LABETALOL 100 MG/1
100 TABLET, FILM COATED ORAL EVERY 12 HOURS SCHEDULED
Status: DISCONTINUED | OUTPATIENT
Start: 2022-01-23 | End: 2022-01-24 | Stop reason: HOSPADM

## 2022-01-23 RX ORDER — NICOTINE POLACRILEX 4 MG
15 LOZENGE BUCCAL
Status: DISCONTINUED | OUTPATIENT
Start: 2022-01-23 | End: 2022-01-24 | Stop reason: HOSPADM

## 2022-01-23 RX ADMIN — ALBUTEROL SULFATE 2 PUFF: 90 AEROSOL, METERED RESPIRATORY (INHALATION) at 12:02

## 2022-01-23 RX ADMIN — ACETAMINOPHEN 1000 MG: 500 TABLET ORAL at 06:25

## 2022-01-23 RX ADMIN — Medication 1 TABLET: at 08:41

## 2022-01-23 RX ADMIN — CETIRIZINE HYDROCHLORIDE 10 MG: 10 TABLET, FILM COATED ORAL at 08:42

## 2022-01-23 RX ADMIN — DOCUSATE SODIUM 100 MG: 100 CAPSULE, LIQUID FILLED ORAL at 08:42

## 2022-01-23 RX ADMIN — IBUPROFEN 800 MG: 800 TABLET, FILM COATED ORAL at 06:25

## 2022-01-23 RX ADMIN — LABETALOL HYDROCHLORIDE 100 MG: 100 TABLET, FILM COATED ORAL at 13:27

## 2022-01-23 RX ADMIN — ENOXAPARIN SODIUM 40 MG: 40 INJECTION SUBCUTANEOUS at 21:25

## 2022-01-23 RX ADMIN — SIMETHICONE 80 MG: 80 TABLET, CHEWABLE ORAL at 18:05

## 2022-01-23 RX ADMIN — SIMETHICONE 80 MG: 80 TABLET, CHEWABLE ORAL at 21:24

## 2022-01-23 RX ADMIN — IPRATROPIUM BROMIDE 2 PUFF: 17 AEROSOL, METERED RESPIRATORY (INHALATION) at 16:48

## 2022-01-23 RX ADMIN — IBUPROFEN 800 MG: 800 TABLET, FILM COATED ORAL at 13:17

## 2022-01-23 RX ADMIN — ALBUTEROL SULFATE 2 PUFF: 90 AEROSOL, METERED RESPIRATORY (INHALATION) at 16:50

## 2022-01-23 RX ADMIN — IPRATROPIUM BROMIDE 2 PUFF: 17 AEROSOL, METERED RESPIRATORY (INHALATION) at 12:03

## 2022-01-23 RX ADMIN — BUDESONIDE AND FORMOTEROL FUMARATE DIHYDRATE 2 PUFF: 80; 4.5 AEROSOL RESPIRATORY (INHALATION) at 13:21

## 2022-01-23 RX ADMIN — SCOLOPAMINE TRANSDERMAL SYSTEM 1 PATCH: 1 PATCH, EXTENDED RELEASE TRANSDERMAL at 13:21

## 2022-01-23 RX ADMIN — GUAIFENESIN 600 MG: 600 TABLET, EXTENDED RELEASE ORAL at 21:24

## 2022-01-23 RX ADMIN — BENZOCAINE 6 MG-MENTHOL 10 MG LOZENGES 1 LOZENGE: at 18:05

## 2022-01-23 RX ADMIN — ACETAMINOPHEN 1000 MG: 500 TABLET ORAL at 21:24

## 2022-01-23 RX ADMIN — ONDANSETRON HYDROCHLORIDE 4 MG: 4 TABLET, FILM COATED ORAL at 08:53

## 2022-01-23 RX ADMIN — ACETAMINOPHEN 1000 MG: 500 TABLET ORAL at 13:18

## 2022-01-23 RX ADMIN — DEXAMETHASONE 6 MG: 2 TABLET ORAL at 13:28

## 2022-01-23 RX ADMIN — SIMETHICONE 80 MG: 80 TABLET, CHEWABLE ORAL at 13:21

## 2022-01-23 RX ADMIN — POLYETHYLENE GLYCOL 3350 17 G: 17 POWDER, FOR SOLUTION ORAL at 08:40

## 2022-01-23 RX ADMIN — SIMETHICONE 80 MG: 80 TABLET, CHEWABLE ORAL at 07:34

## 2022-01-23 RX ADMIN — GUAIFENESIN 600 MG: 600 TABLET, EXTENDED RELEASE ORAL at 13:19

## 2022-01-23 RX ADMIN — INSULIN DETEMIR 10 UNITS: 100 INJECTION, SOLUTION SUBCUTANEOUS at 21:24

## 2022-01-23 RX ADMIN — ACETAMINOPHEN 1000 MG: 500 TABLET ORAL at 01:00

## 2022-01-23 RX ADMIN — DOCUSATE SODIUM 100 MG: 100 CAPSULE, LIQUID FILLED ORAL at 21:25

## 2022-01-23 RX ADMIN — IBUPROFEN 800 MG: 800 TABLET, FILM COATED ORAL at 21:24

## 2022-01-24 VITALS
SYSTOLIC BLOOD PRESSURE: 129 MMHG | DIASTOLIC BLOOD PRESSURE: 56 MMHG | OXYGEN SATURATION: 97 % | TEMPERATURE: 98.4 F | RESPIRATION RATE: 16 BRPM | HEART RATE: 75 BPM

## 2022-01-24 PROBLEM — O99.213 OBESITY AFFECTING PREGNANCY IN THIRD TRIMESTER: Status: ACTIVE | Noted: 2021-09-24

## 2022-01-24 PROBLEM — U07.1 COVID-19 VIRUS DETECTED: Status: ACTIVE | Noted: 2022-01-24

## 2022-01-24 LAB — GLUCOSE BLDC GLUCOMTR-MCNC: 115 MG/DL (ref 70–130)

## 2022-01-24 PROCEDURE — 94799 UNLISTED PULMONARY SVC/PX: CPT

## 2022-01-24 PROCEDURE — 0503F POSTPARTUM CARE VISIT: CPT | Performed by: OBSTETRICS & GYNECOLOGY

## 2022-01-24 PROCEDURE — 94760 N-INVAS EAR/PLS OXIMETRY 1: CPT

## 2022-01-24 PROCEDURE — 82962 GLUCOSE BLOOD TEST: CPT

## 2022-01-24 RX ORDER — OXYCODONE HYDROCHLORIDE 5 MG/1
5 TABLET ORAL EVERY 6 HOURS PRN
Qty: 15 TABLET | Refills: 0 | Status: SHIPPED | OUTPATIENT
Start: 2022-01-24 | End: 2022-01-28

## 2022-01-24 RX ORDER — BUDESONIDE AND FORMOTEROL FUMARATE DIHYDRATE 80; 4.5 UG/1; UG/1
2 AEROSOL RESPIRATORY (INHALATION)
Qty: 10.2 G | Refills: 0 | Status: SHIPPED | OUTPATIENT
Start: 2022-01-24 | End: 2022-02-25

## 2022-01-24 RX ORDER — INSULIN GLARGINE 100 [IU]/ML
10 INJECTION, SOLUTION SUBCUTANEOUS NIGHTLY
Qty: 15 ML | Refills: 0 | Status: SHIPPED | OUTPATIENT
Start: 2022-01-24

## 2022-01-24 RX ORDER — DEXAMETHASONE 2 MG/1
6 TABLET ORAL
Qty: 12 TABLET | Refills: 0 | Status: SHIPPED | OUTPATIENT
Start: 2022-01-24 | End: 2022-01-28

## 2022-01-24 RX ORDER — GUAIFENESIN 600 MG/1
600 TABLET, EXTENDED RELEASE ORAL EVERY 12 HOURS SCHEDULED
Qty: 60 TABLET | Refills: 0 | Status: SHIPPED | OUTPATIENT
Start: 2022-01-24

## 2022-01-24 RX ORDER — ACETAMINOPHEN 500 MG
1000 TABLET ORAL EVERY 6 HOURS PRN
Qty: 30 TABLET | Refills: 1 | Status: SHIPPED | OUTPATIENT
Start: 2022-01-24 | End: 2022-02-02 | Stop reason: SDUPTHER

## 2022-01-24 RX ORDER — IBUPROFEN 800 MG/1
800 TABLET ORAL EVERY 8 HOURS PRN
Qty: 30 TABLET | Refills: 1 | Status: SHIPPED | OUTPATIENT
Start: 2022-01-24 | End: 2022-02-02 | Stop reason: SDUPTHER

## 2022-01-24 RX ORDER — ONDANSETRON 8 MG/1
TABLET, ORALLY DISINTEGRATING ORAL
Qty: 20 TABLET | OUTPATIENT
Start: 2022-01-24

## 2022-01-24 RX ORDER — ALBUTEROL SULFATE 90 UG/1
2 AEROSOL, METERED RESPIRATORY (INHALATION) EVERY 4 HOURS PRN
Qty: 8.5 G | Refills: 0 | Status: SHIPPED | OUTPATIENT
Start: 2022-01-24

## 2022-01-24 RX ORDER — INSULIN LISPRO 100 [IU]/ML
INJECTION, SOLUTION INTRAVENOUS; SUBCUTANEOUS
Qty: 15 ML | Refills: 0 | Status: SHIPPED | OUTPATIENT
Start: 2022-01-24

## 2022-01-24 RX ADMIN — IPRATROPIUM BROMIDE 2 PUFF: 17 AEROSOL, METERED RESPIRATORY (INHALATION) at 08:04

## 2022-01-24 RX ADMIN — IPRATROPIUM BROMIDE 2 PUFF: 17 AEROSOL, METERED RESPIRATORY (INHALATION) at 11:04

## 2022-01-24 RX ADMIN — LABETALOL HYDROCHLORIDE 100 MG: 100 TABLET, FILM COATED ORAL at 08:57

## 2022-01-24 RX ADMIN — SIMETHICONE 80 MG: 80 TABLET, CHEWABLE ORAL at 08:57

## 2022-01-24 RX ADMIN — OXYCODONE HYDROCHLORIDE 10 MG: 10 TABLET ORAL at 02:32

## 2022-01-24 RX ADMIN — ACETAMINOPHEN 1000 MG: 500 TABLET ORAL at 05:25

## 2022-01-24 RX ADMIN — BUDESONIDE AND FORMOTEROL FUMARATE DIHYDRATE 2 PUFF: 80; 4.5 AEROSOL RESPIRATORY (INHALATION) at 01:13

## 2022-01-24 RX ADMIN — IBUPROFEN 800 MG: 800 TABLET, FILM COATED ORAL at 05:25

## 2022-01-24 RX ADMIN — IPRATROPIUM BROMIDE 2 PUFF: 17 AEROSOL, METERED RESPIRATORY (INHALATION) at 01:11

## 2022-01-24 RX ADMIN — ALBUTEROL SULFATE 2 PUFF: 90 AEROSOL, METERED RESPIRATORY (INHALATION) at 11:04

## 2022-01-24 RX ADMIN — Medication 1 TABLET: at 08:57

## 2022-01-24 RX ADMIN — CETIRIZINE HYDROCHLORIDE 10 MG: 10 TABLET, FILM COATED ORAL at 08:57

## 2022-01-24 RX ADMIN — DOCUSATE SODIUM 100 MG: 100 CAPSULE, LIQUID FILLED ORAL at 08:57

## 2022-01-24 RX ADMIN — ALBUTEROL SULFATE 2 PUFF: 90 AEROSOL, METERED RESPIRATORY (INHALATION) at 01:16

## 2022-01-24 RX ADMIN — POLYETHYLENE GLYCOL 3350 17 G: 17 POWDER, FOR SOLUTION ORAL at 08:58

## 2022-01-24 RX ADMIN — ALBUTEROL SULFATE 2 PUFF: 90 AEROSOL, METERED RESPIRATORY (INHALATION) at 08:04

## 2022-01-24 RX ADMIN — BUDESONIDE AND FORMOTEROL FUMARATE DIHYDRATE 2 PUFF: 80; 4.5 AEROSOL RESPIRATORY (INHALATION) at 08:04

## 2022-01-25 LAB
LAB AP CASE REPORT: NORMAL
PATH REPORT.FINAL DX SPEC: NORMAL

## 2022-01-26 ENCOUNTER — TELEPHONE (OUTPATIENT)
Dept: LACTATION | Facility: HOSPITAL | Age: 37
End: 2022-01-26

## 2022-01-26 ENCOUNTER — TELEPHONE (OUTPATIENT)
Dept: OBSTETRICS AND GYNECOLOGY | Facility: HOSPITAL | Age: 37
End: 2022-01-26

## 2022-01-27 NOTE — TELEPHONE ENCOUNTER
Spoke to patient as Maternity Navigator.  She states both she and baby are doing well after being discharged home on Monday.  Baby girl has had a pediatric follow up appointment and has gained weight since discharge home from hospital.  Donna says she is also doing well and Covid symptoms have greatly decreased.  Donna will have her post delivery follow up appointment in about two weeks.  She will be coming in on 2/2/22 in the afternoon for a Lactation appointment and I will plan on touching base with her then.

## 2022-01-28 ENCOUNTER — TELEPHONE (OUTPATIENT)
Dept: ENDOCRINOLOGY | Facility: CLINIC | Age: 37
End: 2022-01-28

## 2022-01-28 NOTE — TELEPHONE ENCOUNTER
Fasting are at goal so no basal     Already took trulicity    Redefined that goal is to be less than 180     Guesstimate before meals to achieve this goal       Pt gave birth on 1/20/22 and is having issues with her sugar being high now. Can you please call pt to advise? Thank you

## 2022-01-29 LAB
QT INTERVAL: 354 MS
QTC INTERVAL: 425 MS

## 2022-02-02 ENCOUNTER — TELEPHONE (OUTPATIENT)
Dept: OBSTETRICS AND GYNECOLOGY | Facility: HOSPITAL | Age: 37
End: 2022-02-02

## 2022-02-02 ENCOUNTER — POSTPARTUM VISIT (OUTPATIENT)
Dept: OBSTETRICS AND GYNECOLOGY | Facility: CLINIC | Age: 37
End: 2022-02-02

## 2022-02-02 ENCOUNTER — LAB (OUTPATIENT)
Dept: LAB | Facility: HOSPITAL | Age: 37
End: 2022-02-02

## 2022-02-02 VITALS
WEIGHT: 243 LBS | SYSTOLIC BLOOD PRESSURE: 130 MMHG | BODY MASS INDEX: 40.48 KG/M2 | DIASTOLIC BLOOD PRESSURE: 80 MMHG | HEIGHT: 65 IN

## 2022-02-02 DIAGNOSIS — R30.0 DYSURIA: ICD-10-CM

## 2022-02-02 LAB
BACTERIA UR QL AUTO: ABNORMAL /HPF
BILIRUB UR QL STRIP: NEGATIVE
CLARITY UR: ABNORMAL
COLOR UR: YELLOW
GLUCOSE UR STRIP-MCNC: NEGATIVE MG/DL
HGB UR QL STRIP.AUTO: ABNORMAL
HYALINE CASTS UR QL AUTO: ABNORMAL /LPF
KETONES UR QL STRIP: NEGATIVE
LEUKOCYTE ESTERASE UR QL STRIP.AUTO: ABNORMAL
NITRITE UR QL STRIP: NEGATIVE
PH UR STRIP.AUTO: 5.5 [PH] (ref 5–9)
PROT UR QL STRIP: ABNORMAL
RBC # UR STRIP: ABNORMAL /HPF
REF LAB TEST METHOD: ABNORMAL
SP GR UR STRIP: 1.02 (ref 1–1.03)
SQUAMOUS #/AREA URNS HPF: ABNORMAL /HPF
UROBILINOGEN UR QL STRIP: ABNORMAL
WBC # UR STRIP: ABNORMAL /HPF

## 2022-02-02 PROCEDURE — 0503F POSTPARTUM CARE VISIT: CPT | Performed by: OBSTETRICS & GYNECOLOGY

## 2022-02-02 PROCEDURE — 81001 URINALYSIS AUTO W/SCOPE: CPT

## 2022-02-02 PROCEDURE — 87086 URINE CULTURE/COLONY COUNT: CPT

## 2022-02-02 RX ORDER — IBUPROFEN 800 MG/1
800 TABLET ORAL EVERY 8 HOURS PRN
Qty: 30 TABLET | Refills: 1 | Status: SHIPPED | OUTPATIENT
Start: 2022-02-02 | End: 2022-04-05

## 2022-02-02 RX ORDER — NITROFURANTOIN 25; 75 MG/1; MG/1
100 CAPSULE ORAL 2 TIMES DAILY
Qty: 14 CAPSULE | Refills: 0 | Status: SHIPPED | OUTPATIENT
Start: 2022-02-02 | End: 2022-02-09

## 2022-02-02 RX ORDER — ACETAMINOPHEN 500 MG
1000 TABLET ORAL EVERY 6 HOURS PRN
Qty: 30 TABLET | Refills: 1 | Status: SHIPPED | OUTPATIENT
Start: 2022-02-02 | End: 2022-02-25

## 2022-02-02 NOTE — PROGRESS NOTES
"Postpartum visit      Donna Linder is a 36 y.o.  s/p , Low Transverse on 2022 at 38w1d secondary to CHTN who presents today for postpartum check.  Her postoperative course was complicated by ABLA with hemoperitoneum requiring exploratory laparotomy and control of bleeding on .  She did receive 3 units pRBCs while hospitalized.  Although her COVID pre-op test was negative, she was found to have COVID on  requiring supplemental O2.  With respiratory protocol and therapy, she was able to come off of O2.    The patient states she feels well.  Patient denies postpartum depression.  Menstrual cycles have not resumed but still bleeding.  Bottlefeeding but pumping some; states that minimal amounts on the right breast.  She missed her appointment this morning w/ Clari.  Declines contraception.  She has not resumed sexual intercourse.  Denies bowel or bladder issues.  Has some dysuria.    PHYSICAL EXAM:    /80   Ht 165.1 cm (65\")   Wt 110 kg (243 lb)   LMP 2021 (Exact Date)   BMI 40.44 kg/m²   Abdomen: +BS, benign, no masses, soft, non-tender.  Incision: Well-healed, clean, dry, intact.  Extremities: No deep calf tenderness.  Postpartum Depression Screening Questionnaire: 3    IMPRESSION/PLAN: Donna Linder is a 36 y.o.  s/p , Low Transverse on  with ex lap, control of bleeding on .  Doing well.  - Recovered nicely from her delivery  - Contraception: Declined  - Encouraged patient to f/u Pulmonology  - Encouraged patient to f/u Lactation  - UA w/ culture  - RTC 5 weeks for final PP visit    This document has been electronically signed by Audrey Cummins MD on 2022 14:47 CST.  "

## 2022-02-03 LAB — BACTERIA SPEC AEROBE CULT: NO GROWTH

## 2022-02-14 RX ORDER — FLUCONAZOLE 150 MG/1
TABLET ORAL
Qty: 2 TABLET | Refills: 0 | Status: SHIPPED | OUTPATIENT
Start: 2022-02-14 | End: 2022-02-15

## 2022-02-15 ENCOUNTER — LAB (OUTPATIENT)
Dept: LAB | Facility: HOSPITAL | Age: 37
End: 2022-02-15

## 2022-02-15 ENCOUNTER — OFFICE VISIT (OUTPATIENT)
Dept: OBSTETRICS AND GYNECOLOGY | Facility: CLINIC | Age: 37
End: 2022-02-15

## 2022-02-15 VITALS
HEIGHT: 65 IN | WEIGHT: 243 LBS | DIASTOLIC BLOOD PRESSURE: 76 MMHG | SYSTOLIC BLOOD PRESSURE: 122 MMHG | BODY MASS INDEX: 40.48 KG/M2

## 2022-02-15 DIAGNOSIS — Z98.891 STATUS POST REPEAT LOW TRANSVERSE CESAREAN SECTION: ICD-10-CM

## 2022-02-15 DIAGNOSIS — Z09 POSTOPERATIVE FOLLOW-UP: ICD-10-CM

## 2022-02-15 DIAGNOSIS — Z09 POSTOPERATIVE FOLLOW-UP: Primary | ICD-10-CM

## 2022-02-15 LAB
DEPRECATED RDW RBC AUTO: 47.6 FL (ref 37–54)
ERYTHROCYTE [DISTWIDTH] IN BLOOD BY AUTOMATED COUNT: 14.6 % (ref 12.3–15.4)
HCT VFR BLD AUTO: 34.4 % (ref 34–46.6)
HGB BLD-MCNC: 11.1 G/DL (ref 12–15.9)
MCH RBC QN AUTO: 29.1 PG (ref 26.6–33)
MCHC RBC AUTO-ENTMCNC: 32.3 G/DL (ref 31.5–35.7)
MCV RBC AUTO: 90.1 FL (ref 79–97)
PLATELET # BLD AUTO: 439 10*3/MM3 (ref 140–450)
PMV BLD AUTO: 9.3 FL (ref 6–12)
RBC # BLD AUTO: 3.82 10*6/MM3 (ref 3.77–5.28)
WBC NRBC COR # BLD: 9.53 10*3/MM3 (ref 3.4–10.8)

## 2022-02-15 PROCEDURE — 36415 COLL VENOUS BLD VENIPUNCTURE: CPT

## 2022-02-15 PROCEDURE — 85027 COMPLETE CBC AUTOMATED: CPT

## 2022-02-15 PROCEDURE — 0503F POSTPARTUM CARE VISIT: CPT | Performed by: OBSTETRICS & GYNECOLOGY

## 2022-02-15 NOTE — PROGRESS NOTES
"Chief complaint: Postpartum visit    SUBJECTIVE:   Pt is a 36 y.o.  now s/p repeat  section approximately 3 weeks ago. Pt denies fever, abdominal pain, n/v/d/c, VB, Pt currently breast feeding and taking PNV, ambulating without difficulty, urinating and stooling without complaints and tolerating normal diet.  Patient presented for evaluation secondary to concerns of wound opening, felt a small pop yesterday and then noticed a small amount of bleeding from her incision.  OBJECTIVE:  /76   Ht 165.1 cm (65\")   Wt 110 kg (243 lb)   LMP 2021 (Exact Date)   BMI 40.44 kg/m²     Review of Systems   Constitutional: Negative for chills, fatigue and fever.   HENT: Negative for sore throat.    Eyes: Negative for visual disturbance.   Respiratory: Negative for cough, shortness of breath and wheezing.    Cardiovascular: Negative for chest pain, palpitations and leg swelling.   Gastrointestinal: Negative for abdominal pain, diarrhea, nausea and vomiting.   Endocrine: Negative for cold intolerance and heat intolerance.   Genitourinary: Negative for dysuria, flank pain, frequency, menstrual problem, pelvic pain, vaginal bleeding, vaginal discharge and vaginal pain.   Skin: Negative for color change and pallor.   Neurological: Negative for syncope, light-headedness and headaches.   Psychiatric/Behavioral: Negative for dysphoric mood and suicidal ideas. The patient is not nervous/anxious.        Physical Exam  Vitals and nursing note reviewed.   Constitutional:       General: She is not in acute distress.     Appearance: Normal appearance. She is well-developed. She is not ill-appearing, toxic-appearing or diaphoretic.   HENT:      Head: Normocephalic.      Mouth/Throat:      Mouth: Mucous membranes are moist.   Neck:      Thyroid: No thyromegaly.   Abdominal:      General: There is no distension.      Palpations: Abdomen is soft.      Tenderness: There is no abdominal tenderness. There is no guarding. "      Comments: Well-healed Pfannenstiel incision.  No evidence of dehiscence no erythema or evidence of infection at this time.   Genitourinary:     Vagina: Normal.   Musculoskeletal:         General: No swelling, tenderness or deformity. Normal range of motion.      Cervical back: Normal range of motion.   Skin:     General: Skin is warm and dry.      Findings: No erythema.   Neurological:      General: No focal deficit present.      Mental Status: She is alert and oriented to person, place, and time.   Psychiatric:         Mood and Affect: Mood normal.         Behavior: Behavior normal.         Thought Content: Thought content normal.         Judgment: Judgment normal.         ASSESSMENT:    Pt is a 36 y.o.  s/p repeat  section doing well and recovering appropriately.  No evidence of wound dehiscence at this time.  PLAN:   1.  CBC to check on anemia  2. Pt to continue to increase exercise/daily activities as tolerated   3. Continue prenatal vitamins   4. f/u 1 week, sooner as needed.    Med reconciliation completed.        This document has been electronically signed by Chidi Araya DO on February 15, 2022 11:11 CST

## 2022-02-22 RX ORDER — DOCUSATE SODIUM 50 MG AND SENNOSIDES 8.6 MG 8.6; 5 MG/1; MG/1
TABLET, FILM COATED ORAL
Qty: 60 TABLET | Refills: 1 | Status: SHIPPED | OUTPATIENT
Start: 2022-02-22

## 2022-02-25 ENCOUNTER — LAB (OUTPATIENT)
Dept: LAB | Facility: HOSPITAL | Age: 37
End: 2022-02-25

## 2022-02-25 ENCOUNTER — POSTPARTUM VISIT (OUTPATIENT)
Dept: OBSTETRICS AND GYNECOLOGY | Facility: CLINIC | Age: 37
End: 2022-02-25

## 2022-02-25 VITALS
SYSTOLIC BLOOD PRESSURE: 124 MMHG | BODY MASS INDEX: 39.58 KG/M2 | HEIGHT: 65 IN | WEIGHT: 237.6 LBS | DIASTOLIC BLOOD PRESSURE: 78 MMHG

## 2022-02-25 DIAGNOSIS — R30.0 DYSURIA: ICD-10-CM

## 2022-02-25 DIAGNOSIS — R30.0 DYSURIA: Primary | ICD-10-CM

## 2022-02-25 DIAGNOSIS — Z12.4 CERVICAL CANCER SCREENING: ICD-10-CM

## 2022-02-25 DIAGNOSIS — Z20.2 EXPOSURE TO STD: ICD-10-CM

## 2022-02-25 LAB
BACTERIA UR QL AUTO: ABNORMAL /HPF
BILIRUB UR QL STRIP: NEGATIVE
CANDIDA ALBICANS: NEGATIVE
CLARITY UR: ABNORMAL
COLOR UR: ABNORMAL
GARDNERELLA VAGINALIS: NEGATIVE
GLUCOSE UR STRIP-MCNC: NEGATIVE MG/DL
HBV SURFACE AG SERPL QL IA: NORMAL
HCV AB SER DONR QL: NORMAL
HGB UR QL STRIP.AUTO: ABNORMAL
HIV1+2 AB SER QL: NORMAL
HYALINE CASTS UR QL AUTO: ABNORMAL /LPF
KETONES UR QL STRIP: ABNORMAL
LEUKOCYTE ESTERASE UR QL STRIP.AUTO: ABNORMAL
NITRITE UR QL STRIP: NEGATIVE
PH UR STRIP.AUTO: 5.5 [PH] (ref 5–8)
PROT UR QL STRIP: ABNORMAL
RBC # UR STRIP: ABNORMAL /HPF
REF LAB TEST METHOD: ABNORMAL
SP GR UR STRIP: >=1.03 (ref 1–1.03)
SQUAMOUS #/AREA URNS HPF: ABNORMAL /HPF
T VAGINALIS DNA VAG QL PROBE+SIG AMP: NEGATIVE
UROBILINOGEN UR QL STRIP: ABNORMAL
WBC # UR STRIP: ABNORMAL /HPF

## 2022-02-25 PROCEDURE — 87510 GARDNER VAG DNA DIR PROBE: CPT | Performed by: STUDENT IN AN ORGANIZED HEALTH CARE EDUCATION/TRAINING PROGRAM

## 2022-02-25 PROCEDURE — G0432 EIA HIV-1/HIV-2 SCREEN: HCPCS | Performed by: STUDENT IN AN ORGANIZED HEALTH CARE EDUCATION/TRAINING PROGRAM

## 2022-02-25 PROCEDURE — 36415 COLL VENOUS BLD VENIPUNCTURE: CPT | Performed by: STUDENT IN AN ORGANIZED HEALTH CARE EDUCATION/TRAINING PROGRAM

## 2022-02-25 PROCEDURE — 87480 CANDIDA DNA DIR PROBE: CPT | Performed by: STUDENT IN AN ORGANIZED HEALTH CARE EDUCATION/TRAINING PROGRAM

## 2022-02-25 PROCEDURE — 87491 CHLMYD TRACH DNA AMP PROBE: CPT

## 2022-02-25 PROCEDURE — 87086 URINE CULTURE/COLONY COUNT: CPT

## 2022-02-25 PROCEDURE — 87661 TRICHOMONAS VAGINALIS AMPLIF: CPT

## 2022-02-25 PROCEDURE — 87340 HEPATITIS B SURFACE AG IA: CPT | Performed by: STUDENT IN AN ORGANIZED HEALTH CARE EDUCATION/TRAINING PROGRAM

## 2022-02-25 PROCEDURE — 87591 N.GONORRHOEAE DNA AMP PROB: CPT

## 2022-02-25 PROCEDURE — 81001 URINALYSIS AUTO W/SCOPE: CPT

## 2022-02-25 PROCEDURE — 87660 TRICHOMONAS VAGIN DIR PROBE: CPT | Performed by: STUDENT IN AN ORGANIZED HEALTH CARE EDUCATION/TRAINING PROGRAM

## 2022-02-25 PROCEDURE — 86803 HEPATITIS C AB TEST: CPT | Performed by: STUDENT IN AN ORGANIZED HEALTH CARE EDUCATION/TRAINING PROGRAM

## 2022-02-25 PROCEDURE — 0503F POSTPARTUM CARE VISIT: CPT | Performed by: STUDENT IN AN ORGANIZED HEALTH CARE EDUCATION/TRAINING PROGRAM

## 2022-02-25 PROCEDURE — 86592 SYPHILIS TEST NON-TREP QUAL: CPT | Performed by: STUDENT IN AN ORGANIZED HEALTH CARE EDUCATION/TRAINING PROGRAM

## 2022-02-25 RX ORDER — FLUOXETINE HYDROCHLORIDE 20 MG/1
20 CAPSULE ORAL DAILY
Qty: 30 CAPSULE | Refills: 1 | Status: SHIPPED | OUTPATIENT
Start: 2022-02-25

## 2022-02-25 RX ORDER — NORGESTIMATE AND ETHINYL ESTRADIOL 7DAYSX3 28
1 KIT ORAL DAILY
Qty: 28 TABLET | Refills: 12 | Status: SHIPPED | OUTPATIENT
Start: 2022-02-25 | End: 2023-03-16

## 2022-02-26 LAB
C TRACH RRNA CVX QL NAA+PROBE: NEGATIVE
N GONORRHOEA RRNA SPEC QL NAA+PROBE: NEGATIVE
RPR SER QL: NORMAL
TRICHOMONAS VAGINALIS PCR: NEGATIVE

## 2022-02-27 LAB — BACTERIA SPEC AEROBE CULT: ABNORMAL

## 2022-03-02 LAB
LAB AP CASE REPORT: NORMAL
PATH INTERP SPEC-IMP: NORMAL

## 2022-03-07 RX ORDER — ONDANSETRON 4 MG/1
TABLET, FILM COATED ORAL
Qty: 30 TABLET | Refills: 1 | OUTPATIENT
Start: 2022-03-07

## 2022-03-07 RX ORDER — LABETALOL 100 MG/1
100 TABLET, FILM COATED ORAL 2 TIMES DAILY
Qty: 60 TABLET | Refills: 1 | Status: SHIPPED | OUTPATIENT
Start: 2022-03-07

## 2022-04-02 ENCOUNTER — PATIENT MESSAGE (OUTPATIENT)
Dept: OBSTETRICS AND GYNECOLOGY | Facility: CLINIC | Age: 37
End: 2022-04-02

## 2022-04-02 DIAGNOSIS — N93.9 ABNORMAL UTERINE BLEEDING: Primary | ICD-10-CM

## 2022-04-05 RX ORDER — IBUPROFEN 800 MG/1
800 TABLET ORAL EVERY 8 HOURS
Qty: 30 TABLET | Refills: 1 | Status: SHIPPED | OUTPATIENT
Start: 2022-04-05 | End: 2022-04-10

## 2022-04-07 ENCOUNTER — APPOINTMENT (OUTPATIENT)
Dept: ONCOLOGY | Facility: HOSPITAL | Age: 37
End: 2022-04-07

## 2022-04-07 ENCOUNTER — APPOINTMENT (OUTPATIENT)
Dept: ONCOLOGY | Facility: CLINIC | Age: 37
End: 2022-04-07

## 2022-04-13 ENCOUNTER — APPOINTMENT (OUTPATIENT)
Dept: ONCOLOGY | Facility: HOSPITAL | Age: 37
End: 2022-04-13

## 2022-04-28 RX ORDER — LABETALOL 100 MG/1
TABLET, FILM COATED ORAL
Qty: 60 TABLET | Refills: 1 | OUTPATIENT
Start: 2022-04-28

## 2022-07-14 ENCOUNTER — TRANSCRIBE ORDERS (OUTPATIENT)
Dept: ORTHOPEDIC SURGERY | Facility: CLINIC | Age: 37
End: 2022-07-14

## 2022-07-14 DIAGNOSIS — M79.642 LEFT HAND PAIN: Primary | ICD-10-CM

## 2022-07-19 ENCOUNTER — LAB (OUTPATIENT)
Dept: LAB | Facility: HOSPITAL | Age: 37
End: 2022-07-19

## 2022-07-19 ENCOUNTER — OFFICE VISIT (OUTPATIENT)
Dept: ORTHOPEDIC SURGERY | Facility: CLINIC | Age: 37
End: 2022-07-19

## 2022-07-19 VITALS — HEIGHT: 65 IN | WEIGHT: 235 LBS | BODY MASS INDEX: 39.15 KG/M2

## 2022-07-19 DIAGNOSIS — M79.641 RIGHT HAND PAIN: Primary | ICD-10-CM

## 2022-07-19 DIAGNOSIS — M79.641 RIGHT HAND PAIN: ICD-10-CM

## 2022-07-19 DIAGNOSIS — M79.642 LEFT HAND PAIN: ICD-10-CM

## 2022-07-19 PROCEDURE — 86225 DNA ANTIBODY NATIVE: CPT

## 2022-07-19 PROCEDURE — 99213 OFFICE O/P EST LOW 20 MIN: CPT | Performed by: ORTHOPAEDIC SURGERY

## 2022-07-19 PROCEDURE — 86431 RHEUMATOID FACTOR QUANT: CPT

## 2022-07-19 PROCEDURE — 85652 RBC SED RATE AUTOMATED: CPT

## 2022-07-19 PROCEDURE — 86038 ANTINUCLEAR ANTIBODIES: CPT

## 2022-07-19 PROCEDURE — 86140 C-REACTIVE PROTEIN: CPT

## 2022-07-19 PROCEDURE — 36415 COLL VENOUS BLD VENIPUNCTURE: CPT

## 2022-07-19 PROCEDURE — 86200 CCP ANTIBODY: CPT

## 2022-07-19 PROCEDURE — 85025 COMPLETE CBC W/AUTO DIFF WBC: CPT

## 2022-07-19 NOTE — PROGRESS NOTES
Donna Linder is a 37 y.o. female   Primary provider:  Provider, No Known       Chief Complaint   Patient presents with   • Right Hand - Pain, Initial Evaluation   • Left Hand - Pain, Initial Evaluation       HISTORY OF PRESENT ILLNESS:R>L hand pain.      37 RHD F with 2 years B, R>L, hand pain and intermittent swelling of insidious onset.  +Occ numbness, particularly at night.  She also says both hands occ lock-up.  She denies triggering of any specific digits.  Has been diagnosed with fibromyalgia in the past by other another provider.  Notes that it's difficult for her to even perform proper hygiene for her infant.  +Braces.  +Ice.  +NSAIDs.  No CSIs.  Of note, she previously underwent open TFCC repair of the L wrist.    Pain  This is a chronic problem. The current episode started more than 1 year ago. The problem occurs constantly. The problem has been gradually worsening. Associated symptoms include headaches and joint swelling. Associated symptoms comments: Stabbing,aching,swelling .        CONCURRENT MEDICAL HISTORY:    Past Medical History:   Diagnosis Date   • Anemia    • Depression    • Diabetes (HCC)    • Diverticulitis    • Endometriosis    • Fibromyalgia    • History of transfusion    • Hypertension    • Kidney stones    • Ovarian cyst    • PCOS (polycystic ovarian syndrome)    • Peptic ulcer disease    • Polycystic ovary syndrome    • Urogenital trichomoniasis    • Varicella        Allergies   Allergen Reactions   • Penicillins Unknown - Low Severity     Patient reports that allergy was as a child but doesn't know the reaction, has tolerated cephalosporins and other antibotics         Current Outpatient Medications:   •  albuterol (ProAir RespiClick) 108 (90 Base) MCG/ACT inhaler, Inhale 2 puffs Every 4 (Four) Hours As Needed for Wheezing., Disp: 1 each, Rfl: 11  •  albuterol (PROVENTIL) (2.5 MG/3ML) 0.083% nebulizer solution, Take 3 mL by nebulization As Needed., Disp: , Rfl:   •  albuterol  sulfate HFA (ProAir HFA) 108 (90 Base) MCG/ACT inhaler, Inhale 2 puffs Every 4 (Four) Hours As Needed for Wheezing., Disp: 8.5 g, Rfl: 0  •  cetirizine (zyrTEC) 10 MG tablet, Take 10 mg by mouth Daily., Disp: , Rfl:   •  Continuous Blood Gluc Sensor (Dexcom G6 Sensor), Every 10 (Ten) Days. Change sensor every 10 days., Disp: 9 each, Rfl: 3  •  Continuous Blood Gluc Transmit (Dexcom G6 Transmitter) misc, 1 each Every 3 (Three) Months., Disp: 1 each, Rfl: 3  •  docusate sodium (Colace) 100 MG capsule, Take 1 capsule by mouth 2 (Two) Times a Day., Disp: 60 capsule, Rfl: 6  •  Dulaglutide 0.75 MG/0.5ML solution pen-injector, Inject 0.75 mg under the skin into the appropriate area as directed 1 (One) Time Per Week., Disp: 4 pen, Rfl: 11  •  ferrous sulfate 325 (65 FE) MG tablet, Take 1 tablet by mouth Daily With Breakfast., Disp: 60 tablet, Rfl: 6  •  FLUoxetine (PROzac) 20 MG capsule, Take 1 capsule by mouth Daily., Disp: 30 capsule, Rfl: 1  •  fluticasone (FLOVENT HFA) 220 MCG/ACT inhaler, Inhale 2 puffs 2 (Two) Times a Day., Disp: 1 each, Rfl: 11  •  glucose blood test strip, Test blood sugar fasting and 1 hour after each meal (QID)., Disp: 120 each, Rfl: 12  •  glucose monitor monitoring kit, 1 each Take As Directed., Disp: 1 each, Rfl: 0  •  guaiFENesin (MUCINEX) 600 MG 12 hr tablet, Take 1 tablet by mouth Every 12 (Twelve) Hours., Disp: 60 tablet, Rfl: 0  •  Insulin Glargine (Lantus SoloStar) 100 UNIT/ML injection pen, Inject 10 Units under the skin into the appropriate area as directed Every Night., Disp: 15 mL, Rfl: 0  •  Insulin Lispro, 1 Unit Dial, (HumaLOG KwikPen) 100 UNIT/ML solution pen-injector, Inject 10 units under the skin into the appropriate area as directed with meals., Disp: 15 mL, Rfl: 0  •  Insulin Pen Needle (Pen Needles) 32G X 4 MM misc, 1 each 4 (Four) Times a Day. Use 4 x daily, Dx code E11.65, Disp: 120 each, Rfl: 11  •  Insulin Pen Needle 31G X 5 MM misc, Use 4 (four) times daily with  insulins., Disp: 120 each, Rfl: 0  •  labetalol (NORMODYNE) 100 MG tablet, Take 1 tablet by mouth 2 (Two) Times a Day., Disp: 60 tablet, Rfl: 1  •  Lancets (onetouch ultrasoft) lancets, Test blood sugar fasting and 1 hour after each meal (QID), Disp: 120 each, Rfl: 12  •  norgestimate-ethinyl estradiol (Tri-Sprintec) 0.18/0.215/0.25 MG-35 MCG per tablet, Take 1 tablet by mouth Daily., Disp: 28 tablet, Rfl: 12  •  polyethylene glycol (MiraLax) 17 g packet, Take 17 g by mouth Daily., Disp: 20 each, Rfl: 12  •  Stimulant Laxative 8.6-50 MG per tablet, TAKE 1 TABLET BY MOUTH TWICE DAILY AS NEEDED FOR CONSTIPATION, Disp: 60 tablet, Rfl: 1  •  vitamin B-12 (CYANOCOBALAMIN) 1000 MCG tablet, Take 1 tablet by mouth Daily., Disp: 90 tablet, Rfl: 1  •  vitamin B-6 (PYRIDOXINE) 25 MG tablet, Take 1 tablet by mouth 2 (Two) Times a Day., Disp: 60 tablet, Rfl: 6    Past Surgical History:   Procedure Laterality Date   • APPENDECTOMY     • BREAST EXCISIONAL BIOPSY Left    •  SECTION     •  SECTION N/A 2022    Procedure:  SECTION REPEAT;  Surgeon: Audrey Cummins MD;  Location: [facility] LABOR DELIVERY;  Service: Obstetrics;  Laterality: N/A;   • EXPLORATORY LAPAROTOMY N/A 2022    Procedure: Exploratory laparotomy and control of bleed;  Surgeon: Audrey Cummins MD;  Location: [facility] OR;  Service: Obstetrics;  Laterality: N/A;   • WRIST ARTHROSCOPY W/ TRIANGULAR FIBROCARTILAGE REPAIR Left        Family History   Problem Relation Age of Onset   • Hypertension Mother    • Diabetes Mother    • Hypertension Father    • Diabetes Father    • Kidney cancer Father    • Diabetes Sister    • Strabismus Daughter    • No Known Problems Maternal Grandfather    • Diabetes Paternal Grandmother    • Colon cancer Paternal Grandfather    • No Known Problems Sister         Social History     Socioeconomic History   • Marital status: Significant Other     Spouse name: Jhon Sahil   • Sharita  "of children: 1   • Highest education level: High school graduate   Tobacco Use   • Smoking status: Never Smoker   • Smokeless tobacco: Never Used   Vaping Use   • Vaping Use: Never used   Substance and Sexual Activity   • Alcohol use: Not Currently     Comment: occasionally    • Drug use: No   • Sexual activity: Yes     Partners: Male     Comment: no recent pap smear        Review of Systems   Musculoskeletal: Positive for joint swelling.        Muscle pain   Neurological: Positive for headaches.   Hematological: Bruises/bleeds easily.   Psychiatric/Behavioral: Positive for sleep disturbance. The patient is nervous/anxious.        PHYSICAL EXAMINATION:       Ht 165.1 cm (65\")   Wt 107 kg (235 lb)   BMI 39.11 kg/m²     Physical Exam    GAIT:     [x]  Normal  []  Antalgic    Assistive device: [x]  None  []  Walker     []  Crutches  []  Cane     []  Wheelchair  []  Stretcher    Ortho Exam  NAD  BUE:  No obvious swelling or deformity.  No obvious thenar/hypothenar atrophy.  No TTP.  +Carpal tunnel Tinel's on R.  Negative bilateral Durkan's, Phalen's.  DNVI.  L wrist:  Well-healed inc.  No e/e/n.  No DRUJ instability.    No results found.        ASSESSMENT:    Diagnoses and all orders for this visit:    Right hand pain  -     XR Hand 3+ View Right    Left hand pain  -     XR Hand 3+ View Left          PLAN  37 RHD F with 2 years B, R>L, hand pain, intermittent swelling, occ numbness, and occ stiffness.  XRs demonstrate no fxs or dislocations.  Joint spaces are well-preserved.  Unclear exact etiology of her sxs.  Though there are some overlapping sxs which could indicate an underlying compressive neuropathy, her presentation is not typical for CTS.  Will send for lab studies to rule out a systemic inflammatory condition.  We will also send her for EMG/NCV studies to rule out an underlying compressive neuropathy.  In the meantime, will refer her to PT/OT for bilateral hand ROM, strengthening, and modalities.  RTC after " the studies are done.  No follow-ups on file.    Cece Moreira, CSA

## 2022-07-20 LAB
BASOPHILS # BLD AUTO: 0.05 10*3/MM3 (ref 0–0.2)
BASOPHILS NFR BLD AUTO: 0.5 % (ref 0–1.5)
CHROMATIN AB SERPL-ACNC: <10 IU/ML (ref 0–14)
CRP SERPL-MCNC: 2.98 MG/DL (ref 0–0.5)
DEPRECATED RDW RBC AUTO: 40.4 FL (ref 37–54)
EOSINOPHIL # BLD AUTO: 0.12 10*3/MM3 (ref 0–0.4)
EOSINOPHIL NFR BLD AUTO: 1.2 % (ref 0.3–6.2)
ERYTHROCYTE [DISTWIDTH] IN BLOOD BY AUTOMATED COUNT: 12.6 % (ref 12.3–15.4)
ERYTHROCYTE [SEDIMENTATION RATE] IN BLOOD: 39 MM/HR (ref 0–20)
HCT VFR BLD AUTO: 35.9 % (ref 34–46.6)
HGB BLD-MCNC: 11.5 G/DL (ref 12–15.9)
IMM GRANULOCYTES # BLD AUTO: 0.03 10*3/MM3 (ref 0–0.05)
IMM GRANULOCYTES NFR BLD AUTO: 0.3 % (ref 0–0.5)
LYMPHOCYTES # BLD AUTO: 2.32 10*3/MM3 (ref 0.7–3.1)
LYMPHOCYTES NFR BLD AUTO: 23.8 % (ref 19.6–45.3)
MCH RBC QN AUTO: 28.4 PG (ref 26.6–33)
MCHC RBC AUTO-ENTMCNC: 32 G/DL (ref 31.5–35.7)
MCV RBC AUTO: 88.6 FL (ref 79–97)
MONOCYTES # BLD AUTO: 0.4 10*3/MM3 (ref 0.1–0.9)
MONOCYTES NFR BLD AUTO: 4.1 % (ref 5–12)
NEUTROPHILS NFR BLD AUTO: 6.82 10*3/MM3 (ref 1.7–7)
NEUTROPHILS NFR BLD AUTO: 70.1 % (ref 42.7–76)
NRBC BLD AUTO-RTO: 0 /100 WBC (ref 0–0.2)
PLATELET # BLD AUTO: 443 10*3/MM3 (ref 140–450)
PMV BLD AUTO: 9.8 FL (ref 6–12)
RBC # BLD AUTO: 4.05 10*6/MM3 (ref 3.77–5.28)
WBC NRBC COR # BLD: 9.74 10*3/MM3 (ref 3.4–10.8)

## 2022-07-21 LAB
ANA SER QL: NEGATIVE
DSDNA AB SER-ACNC: <1 IU/ML (ref 0–9)

## 2022-07-22 LAB — CCP IGA+IGG SERPL IA-ACNC: 6 UNITS (ref 0–19)

## 2022-07-25 DIAGNOSIS — M79.641 RIGHT HAND PAIN: ICD-10-CM

## 2022-07-25 DIAGNOSIS — M79.642 LEFT HAND PAIN: ICD-10-CM

## 2022-07-27 ENCOUNTER — OFFICE VISIT (OUTPATIENT)
Dept: ORTHOPEDIC SURGERY | Facility: CLINIC | Age: 37
End: 2022-07-27

## 2022-07-27 VITALS — WEIGHT: 235 LBS | BODY MASS INDEX: 39.15 KG/M2 | HEIGHT: 65 IN

## 2022-07-27 DIAGNOSIS — M79.642 LEFT HAND PAIN: ICD-10-CM

## 2022-07-27 DIAGNOSIS — M25.532 ACUTE PAIN OF LEFT WRIST: ICD-10-CM

## 2022-07-27 DIAGNOSIS — M79.641 RIGHT HAND PAIN: Primary | ICD-10-CM

## 2022-07-27 PROCEDURE — 99212 OFFICE O/P EST SF 10 MIN: CPT | Performed by: ORTHOPAEDIC SURGERY

## 2022-07-27 NOTE — PROGRESS NOTES
"Donna Linder is a 37 y.o. female returns for     Chief Complaint   Patient presents with   • Right Hand - Follow-up   • Left Hand - Follow-up   • Results     Emg bilateral upper ext.        HISTORY OF PRESENT ILLNESS:    37 RHD F returns to clinic regarding her B, R>L, hand pain.  She does get occ numbness in her hands, particularly at night, but for the most part, her symptoms are atypical for CTS.  She localizes her pain primarily to the dorsal aspect of the wrists and hands.  She has intermittent swelling and says that both hand seem to \"lock up\" on her.  Another provider suggested that she may have fibromyalgia.  She was last seen in clinic 1 week ago.  At that time, we send her for EMG/NCV studies as well as a slew of labs to r/o an underlying inflammatory condition.  She returns now after the studies have been completed to discuss the findings and any further recommendations.     CONCURRENT MEDICAL HISTORY:    The following portions of the patient's history were reviewed and updated as appropriate: allergies, current medications, past family history, past medical history, past social history, past surgical history and problem list.     ROS  No fevers or chills.  No chest pain or shortness of air.  No GI or  disturbances.    PHYSICAL EXAMINATION:       Ht 165.1 cm (65\")   Wt 107 kg (235 lb)   BMI 39.11 kg/m²     Physical Exam    GAIT:     [x]  Normal  []  Antalgic    Assistive device: [x]  None  []  Walker     []  Crutches  []  Cane     []  Wheelchair  []  Stretcher    Ortho Exam  NAD  B hands:  Exam deferred.    XR Hand 3+ View Left    Result Date: 7/19/2022  Narrative: Three view left hand HISTORY: Pain AP, lateral and oblique views obtained. COMPARISON: October 28, 2020 FINDINGS: No fracture or dislocation. No other osseous or articular abnormality.     Impression: CONCLUSION: Normal left hand [id number] Electronically signed by:  Billy Owusu MD  7/19/2022 8:09 PM CDT Workstation: [id number]    XR Hand " "3+ View Right    Result Date: 7/19/2022  Narrative: Three view right hand HISTORY: Pain AP, lateral and oblique views obtained. COMPARISON: October 28, 2020 FINDINGS: No fracture or dislocation. No other osseous or articular abnormality.     Impression: CONCLUSION: Normal right hand [id number] Electronically signed by:  Billy Owusu MD  7/19/2022 8:10 PM CDT Workstation: [id number]            ASSESSMENT:    There are no diagnoses linked to this encounter.      PLAN  37 RHD F with 2 years B, R>L, hand pain and occ numbness.  Her neurodiagnostic studies found evidence of \"extremely mild CTS, R>L.\"  Meanwhile, her lab tests revealed elevated ESR of 39 and CRP of 2.98.  Other lab test were negative.  It's quite interesting that her inflammatory markers are elevated as much as they are because her presentation is really atypical for CTS and much more indicative of a medical/inflammatory condition.  I do not think CTR is indicated in this scenario.  She will be referred to Rheumatology for further evaluation and treatment.  In the meantime, rec that she proceed with PT/OT for B hand ROM, strengthening exercises, and modalities.  RTC prn.    No follow-ups on file.    Cece Moreira, BEVERLEY  "

## 2022-07-28 ENCOUNTER — APPOINTMENT (OUTPATIENT)
Dept: OCCUPATIONAL THERAPY | Facility: HOSPITAL | Age: 37
End: 2022-07-28

## 2022-08-04 ENCOUNTER — APPOINTMENT (OUTPATIENT)
Dept: OCCUPATIONAL THERAPY | Facility: HOSPITAL | Age: 37
End: 2022-08-04

## 2022-09-26 ENCOUNTER — TRANSCRIBE ORDERS (OUTPATIENT)
Dept: PHYSICAL THERAPY | Facility: HOSPITAL | Age: 37
End: 2022-09-26

## 2022-09-26 DIAGNOSIS — M79.641 BILATERAL HAND PAIN: Primary | ICD-10-CM

## 2022-09-26 DIAGNOSIS — M79.642 BILATERAL HAND PAIN: Primary | ICD-10-CM

## 2022-10-04 ENCOUNTER — TRANSCRIBE ORDERS (OUTPATIENT)
Dept: OCCUPATIONAL THERAPY | Facility: HOSPITAL | Age: 37
End: 2022-10-04

## 2022-10-04 ENCOUNTER — TRANSCRIBE ORDERS (OUTPATIENT)
Dept: PHYSICAL THERAPY | Facility: HOSPITAL | Age: 37
End: 2022-10-04

## 2022-10-04 DIAGNOSIS — M79.641 BILATERAL HAND PAIN: Primary | ICD-10-CM

## 2022-10-04 DIAGNOSIS — M79.642 BILATERAL HAND PAIN: Primary | ICD-10-CM

## 2022-10-26 NOTE — ANESTHESIA POSTPROCEDURE EVALUATION
Patient: Donna Linder    Procedure Summary     Date: 22 Room / Location: [facility] LABOR DELIVERY  [facility] LABOR DELIVERY    Anesthesia Start: 723 Anesthesia Stop: 849    Procedure:  SECTION REPEAT (N/A Abdomen) Diagnosis:     Surgeons: Audrey Cummins MD Provider: Alicia Stone CRNA    Anesthesia Type: spinal ASA Status: 3          Anesthesia Type: spinal    Vitals  Vitals Value Taken Time   /71 22   Temp 98.6 °F (37 °C) 22   Pulse 85 22   Resp 18 22   SpO2 99 % 22           Post Anesthesia Care and Evaluation    Patient location during evaluation: bedside  Patient participation: complete - patient participated  Level of consciousness: awake  Pain score: 1  Pain management: adequate  Airway patency: patent  Anesthetic complications: No anesthetic complications  PONV Status: none  Cardiovascular status: acceptable and hemodynamically stable  Respiratory status: acceptable, spontaneous ventilation and room air  Hydration status: acceptable  Post Neuraxial Block status: No signs or symptoms of PDPH  Comments: -------------------------              2248        -------------------------   BP:         139/71        Pulse:        85          Resp:         18          Temp:   98.6 °F (37 °C)   SpO2:         99%        -------------------------       Spoke to daughter, Amber 

## 2022-10-31 RX ORDER — FERROUS SULFATE 325(65) MG
TABLET ORAL
Qty: 60 TABLET | Refills: 6 | OUTPATIENT
Start: 2022-10-31

## 2023-02-14 ENCOUNTER — DOCUMENTATION (OUTPATIENT)
Dept: ENDOCRINOLOGY | Facility: CLINIC | Age: 38
End: 2023-02-14
Payer: COMMERCIAL

## 2023-02-14 NOTE — PROGRESS NOTES
PA FOR TRULICITY SUBMITTED VIA [employer].    TRULICITY APPROVED FROM 2/14/2023 TO  02/13/2024.

## 2023-03-16 RX ORDER — NORGESTIMATE AND ETHINYL ESTRADIOL 7DAYSX3 28
KIT ORAL
Qty: 28 TABLET | Refills: 12 | Status: SHIPPED | OUTPATIENT
Start: 2023-03-16

## 2024-07-11 ENCOUNTER — HOSPITAL ENCOUNTER (OUTPATIENT)
Dept: GENERAL RADIOLOGY | Age: 39
Discharge: HOME | End: 2024-07-11
Payer: COMMERCIAL

## 2024-07-11 DIAGNOSIS — M79.641 BILATERAL HAND PAIN: ICD-10-CM

## 2024-07-11 DIAGNOSIS — M79.642 BILATERAL HAND PAIN: ICD-10-CM

## 2024-07-11 PROCEDURE — 73120 X-RAY EXAM OF HAND: CPT

## (undated) DEVICE — SPNG GZ WOVN 4X4IN 12PLY 10/BX STRL

## (undated) DEVICE — PREP PVP-I 7.5P BT 4OZ

## (undated) DEVICE — PK C/SECT 60

## (undated) DEVICE — SPNG LAP 18X18IN LF STRL PK/5

## (undated) DEVICE — TBG PENCL TELESCP MEGADYNE SMOKE EVAC 10FT

## (undated) DEVICE — Device

## (undated) DEVICE — SUT VIC 0 CTX 36IN J978H

## (undated) DEVICE — GLV SURG SENSICARE POLYISPRN W/ALOE PF LF 6.5 GRN STRL

## (undated) DEVICE — APPL CHLORAPREP W/TINT 26ML ORNG

## (undated) DEVICE — GARMENT,MEDLINE,DVT,INT,CALF,MED, GEN2: Brand: MEDLINE

## (undated) DEVICE — ELECTRD BLD EZ CLN MOD 6.5IN

## (undated) DEVICE — TOTAL TRAY, 16FR 10ML SIL FOLEY, URN: Brand: MEDLINE

## (undated) DEVICE — GLV SURG DERMASSURE GRN LF PF SZ 6.5

## (undated) DEVICE — SUT MNCRYL 3/0 Y936H

## (undated) DEVICE — DRAPE,LAPAROSCOPY,GUSS,STERILE: Brand: MEDLINE

## (undated) DEVICE — DRP SURG UNIV BASIC BILAMINATE 53X77IN DISP

## (undated) DEVICE — SUT VIC 2/0 SH 27IN

## (undated) DEVICE — DRSNG TELFA PAD NONADH STR 1S 3X8IN

## (undated) DEVICE — SUT PDS LP 0 CTX VIO 60IN Z990G

## (undated) DEVICE — DRAPE UNDERBUTTOCKS W FLUID POUCH 40X44IN

## (undated) DEVICE — PK MAJ PROC LF 60

## (undated) DEVICE — GLV SURG SIGNATURE ESSENTIAL PF LTX SZ6.5

## (undated) DEVICE — PAD,ABDOMINAL,8"X10",ST,LF: Brand: MEDLINE

## (undated) DEVICE — UNDRPD BREATH 23X36 BG/10

## (undated) DEVICE — SUT  GUT PLAIN 2/0 CT1 27IN 843H

## (undated) DEVICE — PATIENT RETURN ELECTRODE, SINGLE-USE, CONTACT QUALITY MONITORING, ADULT, WITH 9FT CORD, FOR PATIENTS WEIGING OVER 33LBS. (15KG): Brand: MEGADYNE

## (undated) DEVICE — STERILE POLYISOPRENE POWDER-FREE SURGICAL GLOVES WITH EMOLLIENT COATING: Brand: PROTEXIS

## (undated) DEVICE — PREP SOL POVIDONE/IODINE BT 4OZ

## (undated) DEVICE — GLV SURG SENSICARE PI ORTHO PF SZ7 LF STRL

## (undated) DEVICE — PATIENT RETURN ELECTRODE, SINGLE-USE, CONTACT QUALITY MONITORING, ADULT, WITH 15 FT (4.5 M) CORD. FOR PATIENTS WEIGHING OVER 33LBS. (15KG): Brand: MEGADYNE

## (undated) DEVICE — SYS CLS SKIN PREMIERPRO EXOFINFUSION 22CM

## (undated) DEVICE — GOWN,PREVENTION PLUS,XLNG/XXLARGE,STRL: Brand: MEDLINE

## (undated) DEVICE — TRY SPINE PENCAN 24GA X4IN

## (undated) DEVICE — WRAP LEG 31X48X6IN STRL